# Patient Record
Sex: MALE | NOT HISPANIC OR LATINO | Employment: OTHER
[De-identification: names, ages, dates, MRNs, and addresses within clinical notes are randomized per-mention and may not be internally consistent; named-entity substitution may affect disease eponyms.]

---

## 2017-03-06 ENCOUNTER — RECORDS - HEALTHEAST (OUTPATIENT)
Dept: ADMINISTRATIVE | Facility: OTHER | Age: 62
End: 2017-03-06

## 2017-08-02 ENCOUNTER — RECORDS - HEALTHEAST (OUTPATIENT)
Dept: ADMINISTRATIVE | Facility: OTHER | Age: 62
End: 2017-08-02

## 2017-09-27 ENCOUNTER — TRANSFERRED RECORDS (OUTPATIENT)
Dept: HEALTH INFORMATION MANAGEMENT | Facility: CLINIC | Age: 62
End: 2017-09-27

## 2017-09-27 LAB — TSH SERPL-ACNC: 1.34 UIU/ML (ref 0.35–4.94)

## 2017-10-02 ENCOUNTER — TRANSFERRED RECORDS (OUTPATIENT)
Dept: HEALTH INFORMATION MANAGEMENT | Facility: CLINIC | Age: 62
End: 2017-10-02

## 2017-10-09 ENCOUNTER — RECORDS - HEALTHEAST (OUTPATIENT)
Dept: ADMINISTRATIVE | Facility: OTHER | Age: 62
End: 2017-10-09

## 2017-10-31 ENCOUNTER — RECORDS - HEALTHEAST (OUTPATIENT)
Dept: ADMINISTRATIVE | Facility: OTHER | Age: 62
End: 2017-10-31

## 2017-11-07 ENCOUNTER — PRE VISIT (OUTPATIENT)
Dept: OPHTHALMOLOGY | Facility: CLINIC | Age: 62
End: 2017-11-07

## 2017-11-07 NOTE — TELEPHONE ENCOUNTER
Consult Referred by Dr Elise from [facility] in [place].    For the evaluation of   Chief Complaint   Patient presents with     Previsit     appt w/ Dr Saenzul     Eye Problem     diplopia referred by Dr Elise from [facility]       When was your last eye exam w/ Dilation?   Do you wear glasses? yes Please bring them with you to your appointment.  Do you wear contacts?   How many hours per day to you wear your lenses?   Please bring the boxes with you to your appointment.      HPI:  Location:   OD     Symptoms:   POSITIVE for: and diplopia  Pertinent Negatives:  color blindness, dry eyes, eye pain , foreign body sensation , photophobia  Severity:   moderate  Duration:   years    Timing: progressively getting worse over the last 6 years  Other: diplopia worse when looking to the left and with right head tilt. Disappears with either eye closed. Mostly vertical not much horizontal. Right eye seems images higher    POH:  1-   2-   3-       Do you use any eye drops? no  For what condition(s)?    Ocular Meds:  none       ROS:    Review Of Systems  Eyes: as above  Endocrine:  negative    Allergies:  Allergies not on file    Systemic Medications:   No current outpatient prescriptions on file.     No current facility-administered medications for this visit.        No family history on file.    Social History   Substance Use Topics     Smoking status: Not on file     Smokeless tobacco: Not on file     Alcohol use Not on file     Tammy DÍAZ. OSC

## 2017-11-28 ENCOUNTER — OFFICE VISIT (OUTPATIENT)
Dept: OPHTHALMOLOGY | Facility: CLINIC | Age: 62
End: 2017-11-28
Payer: COMMERCIAL

## 2017-11-28 DIAGNOSIS — R29.891 OCULAR TORTICOLLIS: ICD-10-CM

## 2017-11-28 DIAGNOSIS — H50.21 HYPOTROPIA OF RIGHT EYE: Primary | ICD-10-CM

## 2017-11-28 DIAGNOSIS — H53.2 DIPLOPIA: ICD-10-CM

## 2017-11-28 PROCEDURE — 92060 SENSORIMOTOR EXAMINATION: CPT | Performed by: OPHTHALMOLOGY

## 2017-11-28 PROCEDURE — 92002 INTRM OPH EXAM NEW PATIENT: CPT | Performed by: OPHTHALMOLOGY

## 2017-11-28 RX ORDER — LOSARTAN POTASSIUM 50 MG/1
50 TABLET ORAL DAILY
COMMUNITY
Start: 2017-08-17 | End: 2023-12-04

## 2017-11-28 RX ORDER — ATORVASTATIN CALCIUM 40 MG/1
40 TABLET, FILM COATED ORAL DAILY
COMMUNITY
Start: 2017-08-17 | End: 2023-04-14

## 2017-11-28 ASSESSMENT — REFRACTION_WEARINGRX
OD_CYLINDER: SPHERE
OD_VPRISM: 4
OS_CYLINDER: +1.00
OS_AXIS: 060
OD_SPHERE: -4.75
OD_CYLINDER: +0.25
OD_VBASE: UP
OD_SPHERE: -5.75
OS_SPHERE: -6.50
OS_CYLINDER: +0.50
OS_VBASE: DOWN
OS_AXIS: 68
OD_AXIS: 090
OS_VPRISM: 4
OS_SPHERE: -7.50

## 2017-11-28 ASSESSMENT — EXTERNAL EXAM - RIGHT EYE: OD_EXAM: NORMAL

## 2017-11-28 ASSESSMENT — SLIT LAMP EXAM - LIDS
COMMENTS: NORMAL
COMMENTS: NORMAL

## 2017-11-28 ASSESSMENT — EXTERNAL EXAM - LEFT EYE: OS_EXAM: NORMAL

## 2017-11-28 ASSESSMENT — VISUAL ACUITY
OD_CC: 20/20
METHOD: SNELLEN - LINEAR
CORRECTION_TYPE: GLASSES
OS_CC: 20/20

## 2017-11-28 NOTE — NURSING NOTE
Chief Complaint   Patient presents with     Strabismus Evaluation     Ref by Dr Plascencia for hypotropia, torsion evaluation. MRI was normal. Diplopia is binocular. Blood work for MG and Thyroid were normal, but does not remember if TSI was included. Diplopia starts midlevel towards upgaze, worse in the left gaze. First noted 10 years ago, but getting worse. Got prism glasses 2 weeks ago, but did not them bc it causes diplopia in downgaze     HPI    Symptoms:           Do you have eye pain now?:  No      Comments:  Had bad nose bleed, has probe hooked in the left side. Took valium this morning and last night, but doesn't take it normally

## 2017-11-28 NOTE — NURSING NOTE
Chief Complaint   Patient presents with     Strabismus Evaluation     Ref by Dr Plascencia for hypotropia, torsion evaluation. MRI was normal. Diplopia is binocular. Blood work for MG and Thyroid were normal, but does not remember if TSI was included. Diplopia starts midlevel towards upgaze, worse in the left gaze. First noted 10 years ago, but getting worse. Got prism glasses 2 weeks ago, but did not them bc it causes diplopia in downgaze     HPI    Symptoms:           Do you have eye pain now?:  No
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
## 2017-11-28 NOTE — NURSING NOTE
Chief Complaint   Patient presents with     Strabismus Evaluation     Ref by Dr Plascencia for hypotropia, torsion evaluation. MRI was normal. Diplopia is binocular. Blood work for MG and Thyroid were normal, but does not remember if TSI was included. Diplopia starts midlevel towards upgaze, worse in the left gaze. First noted 10 years ago, but getting worse. Got prism glasses 2 weeks ago, but did not them bc it causes diplopia in downgaze. Does not have bifocals bc needs bottom segment of glasses clear for his binocular single vision     HPI    Symptoms:           Do you have eye pain now?:  No      Comments:  Had bad nose bleed, has probe hooked in the left side. Took valium this morning and last night, but doesn't take it normally

## 2017-12-05 ENCOUNTER — TELEPHONE (OUTPATIENT)
Dept: OPHTHALMOLOGY | Facility: CLINIC | Age: 62
End: 2017-12-05
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
## 2017-12-05 NOTE — TELEPHONE ENCOUNTER
Called to review additional labs and reviewed MRI normal with Dr. Hoffman. Benjamin feels much better now with having his nosebleed resolved (no longer hoarse) and denies any new concerns or issues. Mailed a copy of old glasses prescriptions to me and will bring a copy to next visit -- from memory recalls 1/2009 prescription without any prism and then 3PD down July 2011, 6PD down July 2016 cannot remember over which eye. Reviewed can reschedule to see me sooner than planned 1/5/17 if desired.

## 2017-12-05 NOTE — PATIENT INSTRUCTIONS
Continue to monitor Benjamin's visual function and eye alignment until your next visit with us.  If vision or eye alignment appear to be worsening or if you have any new concerns, please contact our office.  A sooner assessment by Dr. Crawford or our orthoptic team may be necessary. Discussed signs and symptoms of myasthenic crisis including hoarse voice, difficulty swallowing or breathing and need to seek emergent care for any of these symptoms.

## 2017-12-06 DIAGNOSIS — R29.891 OCULAR TORTICOLLIS: ICD-10-CM

## 2017-12-06 DIAGNOSIS — H50.21 HYPOTROPIA OF RIGHT EYE: ICD-10-CM

## 2017-12-06 PROCEDURE — 99000 SPECIMEN HANDLING OFFICE-LAB: CPT | Performed by: OPHTHALMOLOGY

## 2017-12-06 PROCEDURE — 83516 IMMUNOASSAY NONANTIBODY: CPT | Mod: 90 | Performed by: OPHTHALMOLOGY

## 2017-12-06 PROCEDURE — 86800 THYROGLOBULIN ANTIBODY: CPT | Mod: 90 | Performed by: OPHTHALMOLOGY

## 2017-12-06 PROCEDURE — 84432 ASSAY OF THYROGLOBULIN: CPT | Mod: 90 | Performed by: OPHTHALMOLOGY

## 2017-12-06 PROCEDURE — 83519 RIA NONANTIBODY: CPT | Mod: 90 | Performed by: OPHTHALMOLOGY

## 2017-12-06 PROCEDURE — 36415 COLL VENOUS BLD VENIPUNCTURE: CPT | Performed by: OPHTHALMOLOGY

## 2017-12-08 LAB — ACHR BLOCK AB/ACHR TOTAL SFR SER: 0 % (ref 0–26)

## 2017-12-11 ENCOUNTER — OFFICE VISIT (OUTPATIENT)
Dept: OPHTHALMOLOGY | Facility: CLINIC | Age: 62
End: 2017-12-11
Attending: OPHTHALMOLOGY
Payer: COMMERCIAL

## 2017-12-11 DIAGNOSIS — H50.21 HYPOTROPIA OF RIGHT EYE: Primary | ICD-10-CM

## 2017-12-11 DIAGNOSIS — R29.891 OCULAR TORTICOLLIS: ICD-10-CM

## 2017-12-11 DIAGNOSIS — H52.223 REGULAR ASTIGMATISM OF BOTH EYES: ICD-10-CM

## 2017-12-11 DIAGNOSIS — H52.13 MYOPIA OF BOTH EYES: ICD-10-CM

## 2017-12-11 LAB — MUSK AB SER-SCNC: 0 NMOL/L (ref 0–0.02)

## 2017-12-11 PROCEDURE — 92060 SENSORIMOTOR EXAMINATION: CPT | Mod: ZF | Performed by: OPHTHALMOLOGY

## 2017-12-11 PROCEDURE — 99214 OFFICE O/P EST MOD 30 MIN: CPT | Mod: ZF

## 2017-12-11 ASSESSMENT — REFRACTION_WEARINGRX
OD_CYLINDER: +0.25
OS_VBASE: DOWN
OS_CYLINDER: +0.50
OD_SPHERE: -4.75
OS_SPHERE: -6.50
OD_SPHERE: -5.75
SPECS_TYPE: SVL
SPECS_TYPE: PRISM GLASSES
OD_CYLINDER: SPHERE
OD_VPRISM: 4
OS_AXIS: 68
OS_AXIS: 060
OS_CYLINDER: +1.00
OD_AXIS: 090
OD_VBASE: UP
OS_SPHERE: -7.50
OS_VPRISM: 4

## 2017-12-11 ASSESSMENT — CUP TO DISC RATIO
OS_RATIO: 0.3
OD_RATIO: 0.3

## 2017-12-11 ASSESSMENT — REFRACTION
OS_AXIS: 068
OS_SPHERE: -7.25
OD_AXIS: 090
OS_CYLINDER: +1.00
OD_SPHERE: -5.50
OD_CYLINDER: +0.25

## 2017-12-11 ASSESSMENT — VISUAL ACUITY
CORRECTION_TYPE: GLASSES
METHOD: SNELLEN - LINEAR
OS_CC: 20/20
OD_CC: 20/20

## 2017-12-11 ASSESSMENT — EXTERNAL EXAM - RIGHT EYE: OD_EXAM: NORMAL

## 2017-12-11 ASSESSMENT — EXTERNAL EXAM - LEFT EYE: OS_EXAM: NORMAL

## 2017-12-11 NOTE — PATIENT INSTRUCTIONS
"I recommend eye muscle surgery. Today with Benjamin and his spouse, I reviewed the indications, risks, benefits, and alternatives of eye muscle surgery including, but not limited to, failure obtain the desired ocular alignment (\"over\" or \"under\" correction), diplopia, and damage to any structure in or around the eye that may necessitate treatment with medicine, laser, or surgery. I further explained that the goal of surgery is to help control Benjamin's strabismus. Surgery will not \"cure\" Benjamin's strabismus or resolve/prevent the need for refractive corretion. Additional strabismus surgery may be required in the short or long term. I emphasized that regular follow-up to monitor and optimize his vision and alignment would be necessary. We also discussed the risks of surgical injury, bleeding, and infection which may necessitate further medical or surgical treatment and which may result in diplopia, loss of vision, blindness, or loss of the eye(s) in less than 1% of cases and the remote possibility of permanent damage to any organ system or death with the use of general anesthesia.  I explained that we would hide visible scars as much as possible in natural creases but that every patient heals and pigments differently resulting in a variable degree of scarring to the eyes or surrounding facial structures after surgery.  I provided multiple opportunities for questions, answered all questions to the best of my ability, and confirmed that my answers and my discussion were understood.    Read more about your eye muscle surgery or strabismus surgery online at: http://www.aapos.org/terms. Our pediatric ophthalmologists and certified orthoptists are members of the American Association for Pediatric Ophthalmology and Strabismus, an international organization of medical doctors (MDs) and certified orthoptists who completed specialized training in the medical and surgical treatments of all pediatric eye diseases and adult " eye muscle disorders.      For a free and informative book on pediatric eye diseases and adult strabismus, go to:  http://ExoYou.[email or web address]/eyemusclebook    For more information, see also:  Http://eyewiki.aao.org/Category:Pediatric_Ophthalmology/Strabismus

## 2017-12-11 NOTE — NURSING NOTE
Chief Complaint   Patient presents with     Diplopia Follow Up     Wears gls without prism more often.Prism glasses are hard to move eyes around in all fields of gaze. No ptosis ever noted, Right head tilt not necessarily noted now or in past. But does note he likes chin up and left turn.

## 2017-12-11 NOTE — MR AVS SNAPSHOT
"              After Visit Summary   12/11/2017    Benjamin Hooper    MRN: [id number]           Patient Information     Date Of Birth          1955        Visit Information        Provider Department      12/11/2017 1:20 PM Demetrice Crawford MD [facility]        Today's Diagnoses     Hypotropia of right eye    -  1    Ocular torticollis        Myopia of both eyes        Regular astigmatism of both eyes          Care Instructions    I recommend eye muscle surgery. Today with Benjamin and his spouse, I reviewed the indications, risks, benefits, and alternatives of eye muscle surgery including, but not limited to, failure obtain the desired ocular alignment (\"over\" or \"under\" correction), diplopia, and damage to any structure in or around the eye that may necessitate treatment with medicine, laser, or surgery. I further explained that the goal of surgery is to help control Benjamin's strabismus. Surgery will not \"cure\" Benjamin's strabismus or resolve/prevent the need for refractive corretion. Additional strabismus surgery may be required in the short or long term. I emphasized that regular follow-up to monitor and optimize his vision and alignment would be necessary. We also discussed the risks of surgical injury, bleeding, and infection which may necessitate further medical or surgical treatment and which may result in diplopia, loss of vision, blindness, or loss of the eye(s) in less than 1% of cases and the remote possibility of permanent damage to any organ system or death with the use of general anesthesia.  I explained that we would hide visible scars as much as possible in natural creases but that every patient heals and pigments differently resulting in a variable degree of scarring to the eyes or surrounding facial structures after surgery.  I provided multiple opportunities for questions, answered all questions to the best of my ability, and confirmed that my answers and my discussion were " understood.    Read more about your eye muscle surgery or strabismus surgery online at: http://www.aapos.org/terms. Our pediatric ophthalmologists and certified orthoptists are members of the American Association for Pediatric Ophthalmology and Strabismus, an international organization of medical doctors (MDs) and certified orthoptists who completed specialized training in the medical and surgical treatments of all pediatric eye diseases and adult eye muscle disorders.      For a free and informative book on pediatric eye diseases and adult strabismus, go to:  http://Global Integrity.com/eyemusclebook    For more information, see also:  Http://eyewiki.aao.org/Category:Pediatric_Ophthalmology/Strabismus            Follow-ups after your visit        Follow-up notes from your care team     Return for pending workup.      Who to contact     Please call your clinic at [phone] to:    Ask questions about your health    Make or cancel appointments    Discuss your medicines    Learn about your test results    Speak to your doctor   If you have compliments or concerns about an experience at your clinic, or if you wish to file a complaint, please contact [facility] Patient Relations at [phone] or email us at [email or web address]         Additional Information About Your Visit        dynaTrace softwareharDealo Information     [employer] is an electronic gateway that provides easy, online access to your medical records. With [employer], you can request a clinic appointment, read your test results, renew a prescription or communicate with your care team.     To sign up for [employer] visit the website at [email or web address]   You will be asked to enter the access code listed below, as well as some personal information. Please follow the directions to create your username and password.     Your access code is: [email or web address]  Expires: 2018  3:46 PM     Your access code will  in 90 days. If you need help  or a new code, please contact your [facility] or call [phone] for assistance.        Care EveryWhere ID     This is your Care EveryWhere ID. This could be used by other organizations to access your [facility] medical records  [id number]         Blood Pressure from Last 3 Encounters:   No data found for BP    Weight from Last 3 Encounters:   No data found for Wt              We Performed the Following     Sensorimotor        Primary Care Provider Office Phone # Fax #    Philomena Ramírez [phone]       [place] [address] [address] [facility] [address]        Equal Access to Services     [facility]: Hadii aad ku hadasho Soomaali, waaxda luqadaha, qaybta kaalmada adeegyada, waxay chelsie hayaan siena pedraza . So [facility] [phone].    ATENCIÓN: Si habla español, tiene a cody disposición servicios gratuitos de asistencia lingüística. [facility] [phone].    We comply with applicable federal civil rights laws and [place] laws. We do not discriminate on the basis of race, color, national origin, age, disability, sex, sexual orientation, or gender identity.            Thank you!     Thank you for choosing [facility]  for your care. Our goal is always to provide you with excellent care. Hearing back from our patients is one way we can continue to improve our services. Please take a few minutes to complete the written survey that you may receive in the mail after your visit with us. Thank you!             Your Updated Medication List - Protect others around you: Learn how to safely use, store and throw away your medicines at www.disposemymeds.org.          This list is accurate as of: 12/11/17 11:59 PM.  Always use your most recent med list.                   Brand Name Dispense Instructions for use Diagnosis    atorvastatin 20 MG tablet    LIPITOR          losartan 50 MG tablet    COZAAR

## 2017-12-12 PROBLEM — I10 HTN (HYPERTENSION): Status: ACTIVE | Noted: 2017-12-12

## 2017-12-12 PROBLEM — E78.5 HYPERLIPIDEMIA: Status: ACTIVE | Noted: 2017-12-12

## 2017-12-13 ENCOUNTER — TELEPHONE (OUTPATIENT)
Dept: OPHTHALMOLOGY | Facility: CLINIC | Age: 62
End: 2017-12-13

## 2017-12-13 NOTE — TELEPHONE ENCOUNTER
Called Benjamin after discussion of his case with Dr. Chapa to determine any need for further MG workup. Reviewed no further workup at this time but our recommendation to see in 2-3 months to ensure stability of measurements. If unstable would recommend further workup. Agrees with this plan and to call to be seen sooner for any significant changes in double vision, alignment or other concerns.

## 2017-12-17 ENCOUNTER — RADIANT APPOINTMENT (OUTPATIENT)
Dept: GENERAL RADIOLOGY | Facility: CLINIC | Age: 62
End: 2017-12-17
Attending: FAMILY MEDICINE
Payer: COMMERCIAL

## 2017-12-17 ENCOUNTER — OFFICE VISIT (OUTPATIENT)
Dept: URGENT CARE | Facility: URGENT CARE | Age: 62
End: 2017-12-17
Payer: COMMERCIAL

## 2017-12-17 VITALS
RESPIRATION RATE: 18 BRPM | SYSTOLIC BLOOD PRESSURE: 148 MMHG | HEART RATE: 69 BPM | OXYGEN SATURATION: 97 % | DIASTOLIC BLOOD PRESSURE: 88 MMHG

## 2017-12-17 DIAGNOSIS — R10.12 ABDOMINAL PAIN, LEFT UPPER QUADRANT: ICD-10-CM

## 2017-12-17 DIAGNOSIS — F41.9 ANXIETY: ICD-10-CM

## 2017-12-17 DIAGNOSIS — R10.12 ABDOMINAL PAIN, LEFT UPPER QUADRANT: Primary | ICD-10-CM

## 2017-12-17 LAB
ALBUMIN UR-MCNC: NEGATIVE MG/DL
APPEARANCE UR: CLEAR
BASOPHILS # BLD AUTO: 0 10E9/L (ref 0–0.2)
BASOPHILS NFR BLD AUTO: 0.3 %
BILIRUB UR QL STRIP: NEGATIVE
COLOR UR AUTO: YELLOW
DIFFERENTIAL METHOD BLD: NORMAL
EOSINOPHIL # BLD AUTO: 0.2 10E9/L (ref 0–0.7)
EOSINOPHIL NFR BLD AUTO: 1.7 %
ERYTHROCYTE [DISTWIDTH] IN BLOOD BY AUTOMATED COUNT: 12.1 % (ref 10–15)
GLUCOSE UR STRIP-MCNC: NEGATIVE MG/DL
HCT VFR BLD AUTO: 49.2 % (ref 40–53)
HGB BLD-MCNC: 16.9 G/DL (ref 13.3–17.7)
HGB UR QL STRIP: NEGATIVE
KETONES UR STRIP-MCNC: NEGATIVE MG/DL
LEUKOCYTE ESTERASE UR QL STRIP: NEGATIVE
LYMPHOCYTES # BLD AUTO: 3.3 10E9/L (ref 0.8–5.3)
LYMPHOCYTES NFR BLD AUTO: 35.5 %
MCH RBC QN AUTO: 29.9 PG (ref 26.5–33)
MCHC RBC AUTO-ENTMCNC: 34.3 G/DL (ref 31.5–36.5)
MCV RBC AUTO: 87 FL (ref 78–100)
MONOCYTES # BLD AUTO: 0.9 10E9/L (ref 0–1.3)
MONOCYTES NFR BLD AUTO: 9.2 %
NEUTROPHILS # BLD AUTO: 5 10E9/L (ref 1.6–8.3)
NEUTROPHILS NFR BLD AUTO: 53.3 %
NITRATE UR QL: NEGATIVE
PH UR STRIP: 7 PH (ref 5–7)
PLATELET # BLD AUTO: 194 10E9/L (ref 150–450)
RBC # BLD AUTO: 5.65 10E12/L (ref 4.4–5.9)
SOURCE: NORMAL
SP GR UR STRIP: 1.02 (ref 1–1.03)
UROBILINOGEN UR STRIP-ACNC: 0.2 EU/DL (ref 0.2–1)
WBC # BLD AUTO: 9.3 10E9/L (ref 4–11)

## 2017-12-17 PROCEDURE — 85025 COMPLETE CBC W/AUTO DIFF WBC: CPT | Performed by: FAMILY MEDICINE

## 2017-12-17 PROCEDURE — 80053 COMPREHEN METABOLIC PANEL: CPT | Performed by: FAMILY MEDICINE

## 2017-12-17 PROCEDURE — 99204 OFFICE O/P NEW MOD 45 MIN: CPT | Performed by: FAMILY MEDICINE

## 2017-12-17 PROCEDURE — 36415 COLL VENOUS BLD VENIPUNCTURE: CPT | Performed by: FAMILY MEDICINE

## 2017-12-17 PROCEDURE — 81003 URINALYSIS AUTO W/O SCOPE: CPT | Performed by: FAMILY MEDICINE

## 2017-12-17 PROCEDURE — 74020 XR ABDOMEN 2 VW: CPT

## 2017-12-17 RX ORDER — MUPIROCIN 20 MG/G
OINTMENT TOPICAL
COMMUNITY
Start: 2017-12-02 | End: 2019-09-21

## 2017-12-17 RX ORDER — DIAZEPAM 5 MG
2.5-5 TABLET ORAL
COMMUNITY
Start: 2017-11-27 | End: 2019-09-21

## 2017-12-17 NOTE — NURSING NOTE
Chief Complaint   Patient presents with     Urgent Care     upper abdomen is swollen/distended, on and off x1 day - giving him anxiety due to it - hot/cold sweats, yawning, dizziness, blood pressure has been being elevated (normally 120/80, going up to about 170/99) back pain - abdominal swelling is more on the LUQ        Initial /88 (BP Location: Right arm, Patient Position: Chair, Cuff Size: Adult Regular)  Pulse 69  Resp 18  SpO2 97% There is no height or weight on file to calculate BMI.  Medication Reconciliation: complete  Cleopatra Howe, CMA

## 2017-12-17 NOTE — MR AVS SNAPSHOT
After Visit Summary   12/17/2017    Benjamin Hooper    MRN: [id number]           Patient Information     Date Of Birth          1955        Visit Information        Provider Department      12/17/2017 4:10 PM Arnold Michael MD [facility]        Today's Diagnoses     Abdominal pain, left upper quadrant    -  1      Care Instructions    Okay to take gas-X    Consider miralax      Excess Gas  Certain foods produce gas when digested. In some people, these foods make an excessive amount of gas. This may cause bloating, burping, or increased gas passing through the rectum (flatulence).     Foods that cause gas  The following foods are more likely to cause this problem. Limit them, or remove them from your diet:    Broccoli    Cauliflower    [place] sprouts    Cabbage    Cooked dried beans    Fizzy (carbonated) drinks, such as sparkling water, soda, beer, and champagne  Other causes  Other causes of excess gas include:    Eating too fast or talking while you chew. This may cause you to swallow air. This increases the amount of gas in your stomach. And it may make your symptoms worse. Chew each mouthful completely before you swallow. Take your time.    Chewing on gum or sucking on hard candy. These cause you to swallow more often. And some of what you are swallowing is air. This leads to more gas in your stomach. Avoid chewing gum and hard candy.    Overeating. This may increase the feeling of being bloated and cause more gas. When you are full, stop eating.     Being constipated. This can increase the amount of normal intestinal gas. Avoid constipation by getting more fiber in your diet. Good sources of fiber include whole-grain cereal, fresh vegetables (except those in the above list), and fresh fruits. High-fiber foods absorb water and carry it out of the body. When adding more fiber to your diet, you also need to drink more water. You should drink at least 8, 8-ounce glasses of water  (2 quarts) per day.  Date Last Reviewed: 8/1/2016 2000-2017 [employer]. [address] [place] [address], [place] [address]. All rights reserved. This information is not intended as a substitute for professional medical care. Always follow your healthcare professional's instructions.        Constipation (Adult)  Constipation means that you have bowel movements that are less frequent than usual. Stools often become very hard and difficult to pass.  Constipation is very common. At some point in life it affects almost everyone. Since everyone's bowel habits are different, what is constipation to one person may not be to another. Your healthcare provider may do tests to diagnose constipation. It depends on what he or she finds when evaluating you.    Symptoms of constipation include:    Abdominal pain    Bloating    Vomiting    Painful bowel movements    Itching, swelling, bleeding, or pain around the anus  Causes  Constipation can have many causes. These include:    Diet low in fiber    Too much dairy    Not drinking enough liquids    Lack of exercise or physical activity. This is especially true for older adults.    Changes in lifestyle or daily routine, including pregnancy, aging, work, and travel    Frequent use or misuse of laxatives    Ignoring the urge to have a bowel movement or delaying it until later    Medicines, such as certain prescription pain medicines, iron supplements, antacids, certain antidepressants, and calcium supplements    Diseases like irritable bowel syndrome, bowel obstructions, stroke, diabetes, thyroid disease, Parkinson disease, hemorrhoids, and colon cancer  Complications  Potential complications of constipation can include:    Hemorrhoids    Rectal bleeding from hemorrhoids or anal fissures (skin tears)    Hernias    Dependency on laxatives    Chronic constipation    Fecal impaction    Bowel obstruction or perforation  Home care  All treatment should be done after talking with  your healthcare provider. This is especially true if you have another medical problems, are taking prescription medicines, or are an older adult. Treatment most often involves lifestyle changes. You may also need medicines. Your healthcare provider will tell you which will work best for you. Follow the advice below to help avoid this problem in the future.  Lifestyle changes  These lifestyle changes can help prevent constipation:    Diet. Eat a high-fiber diet, with fresh fruit and vegetables, and reduce dairy intake, meats, and processed foods    Fluids. It's important to get enough fluids each day. Drink plenty of water when you eat more fiber. If you are on diet that limits the amount of fluid you can have, talk about this with your healthcare provider.    Regular exercise. Check with your healthcare provider first.  Medications  Take any medicines as directed. Some laxatives are safe to use only every now and then. Others can be taken on a regular basis. Talk with your doctor or pharmacist if you have questions.  Prescription pain medicines can cause constipation. If you are taking this kind of medicine, ask your healthcare provider if you should also take a stool softener.  Medicines you may take to treat constipation include:    Fiber supplements    Stool softeners    Laxatives    Enemas    Rectal suppositories  Follow-up care  Follow up with your healthcare provider if symptoms don't get better in the next few days. You may need to have more tests or see a specialist.  Call 911  Call 911 if any of these occur:    Trouble breathing    Stiff, rigid abdomen that is severely painful to touch    Confusion    Fainting or loss of consciousness    Rapid heart rate    Chest pain  When to seek medical advice  Call your healthcare provider right away if any of these occur:    Fever over 100.4 F (38 C)    Failure to resume normal bowel movements    Pain in your abdomen or back gets worse    Nausea or vomiting    Swelling  "in your abdomen    Blood in the stool    Black, tarry stool    Involuntary weight loss    Weakness  Date Last Reviewed: 12/30/2015 2000-2017 [employer]. [address] [place] [address], [place] [address]. All rights reserved. This information is not intended as a substitute for professional medical care. Always follow your healthcare professional's instructions.                Follow-ups after your visit        Your next 10 appointments already scheduled     Mar 05, 2018  1:00 PM CST   Return Pediatric Visit with Demetrice Crawford MD   [facility] ([facility])    [address]  [address] [address] [phone]              Who to contact     If you have questions or need follow up information about today's clinic visit or your schedule please contact [facility] directly at [phone].  Normal or non-critical lab and imaging results will be communicated to you by MyChart, letter or phone within 4 business days after the clinic has received the results. If you do not hear from us within 7 days, please contact the clinic through [employer] or phone. If you have a critical or abnormal lab result, we will notify you by phone as soon as possible.  Submit refill requests through [employer] or call your pharmacy and they will forward the refill request to us. Please allow 3 business days for your refill to be completed.          Additional Information About Your Visit        MyChart Information     [employer] lets you send messages to your doctor, view your test results, renew your prescriptions, schedule appointments and more. To sign up, go to [email or web address] . Click on \"Log in\" on the left side of the screen, which will take you to the Welcome page. Then click on \"Sign up Now\" on the right side of the page.     You will be asked to enter the access code listed below, as well as some personal information. Please follow the directions to create your " username and password.     Your access code is: [email or web address]  Expires: 2018  3:46 PM     Your access code will  in 90 days. If you need help or a new code, please call your [place] clinic or [phone].        Care EveryWhere ID     This is your Care EveryWhere ID. This could be used by other organizations to access your [place] medical records  [id number]        Your Vitals Were     Pulse Respirations Pulse Oximetry             69 18 97%          Blood Pressure from Last 3 Encounters:   17 148/88    Weight from Last 3 Encounters:   No data found for Wt              We Performed the Following     *UA reflex to Microscopic and Culture ([facility] (except [place] and [place])     CBC with platelets and differential     Comprehensive metabolic panel (BMP + Alb, Alk Phos, ALT, AST, Total. Bili, TP)        Primary Care Provider Office Phone # Fax #    Philomena MARILEE Ramírez [phone]       [address] [address]        Equal Access to Services     GENEVIEVE MAURICIO : Hadii aad ku hadasho Soomaali, waaxda luqadaha, qaybta kaalmada adeegyada, waxay idiin hayaan adeeg derian pedraza . So [facility] [phone].    ATENCIÓN: Si habla español, tiene a cody disposición servicios gratuitos de asistencia lingüística. Llame al [phone].    We comply with applicable federal civil rights laws and [place] laws. We do not discriminate on the basis of race, color, national origin, age, disability, sex, sexual orientation, or gender identity.            Thank you!     Thank you for choosing [facility]  for your care. Our goal is always to provide you with excellent care. Hearing back from our patients is one way we can continue to improve our services. Please take a few minutes to complete the written survey that you may receive in the mail after your visit with us. Thank you!             Your Updated Medication List - Protect others  around you: Learn how to safely use, store and throw away your medicines at www.disposemymeds.org.          This list is accurate as of: 12/17/17  6:30 PM.  Always use your most recent med list.                   Brand Name Dispense Instructions for use Diagnosis    atorvastatin 20 MG tablet    LIPITOR          diazepam 5 MG tablet    VALIUM     Take 2.5-5 mg by mouth        losartan 50 MG tablet    COZAAR          mupirocin 2 % ointment    BACTROBAN
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
## 2017-12-18 ENCOUNTER — RECORDS - HEALTHEAST (OUTPATIENT)
Dept: ADMINISTRATIVE | Facility: OTHER | Age: 62
End: 2017-12-18

## 2017-12-18 LAB
ALBUMIN SERPL-MCNC: 4.3 G/DL (ref 3.4–5)
ALP SERPL-CCNC: 61 U/L (ref 40–150)
ALT SERPL W P-5'-P-CCNC: 46 U/L (ref 0–70)
ANION GAP SERPL CALCULATED.3IONS-SCNC: 8 MMOL/L (ref 3–14)
AST SERPL W P-5'-P-CCNC: 35 U/L (ref 0–45)
BILIRUB SERPL-MCNC: 1.6 MG/DL (ref 0.2–1.3)
BUN SERPL-MCNC: 14 MG/DL (ref 7–30)
CALCIUM SERPL-MCNC: 9.6 MG/DL (ref 8.5–10.1)
CHLORIDE SERPL-SCNC: 104 MMOL/L (ref 94–109)
CO2 SERPL-SCNC: 24 MMOL/L (ref 20–32)
CREAT SERPL-MCNC: 0.87 MG/DL (ref 0.66–1.25)
GFR SERPL CREATININE-BSD FRML MDRD: 88 ML/MIN/1.7M2
GLUCOSE SERPL-MCNC: 86 MG/DL (ref 70–99)
POTASSIUM SERPL-SCNC: 3.8 MMOL/L (ref 3.4–5.3)
PROT SERPL-MCNC: 7.4 G/DL (ref 6.8–8.8)
SODIUM SERPL-SCNC: 136 MMOL/L (ref 133–144)

## 2017-12-18 NOTE — PATIENT INSTRUCTIONS
Okay to take gas-X    Consider miralax      Excess Gas  Certain foods produce gas when digested. In some people, these foods make an excessive amount of gas. This may cause bloating, burping, or increased gas passing through the rectum (flatulence).     Foods that cause gas  The following foods are more likely to cause this problem. Limit them, or remove them from your diet:    Broccoli    Cauliflower    [place] sprouts    Cabbage    Cooked dried beans    Fizzy (carbonated) drinks, such as sparkling water, soda, beer, and champagne  Other causes  Other causes of excess gas include:    Eating too fast or talking while you chew. This may cause you to swallow air. This increases the amount of gas in your stomach. And it may make your symptoms worse. Chew each mouthful completely before you swallow. Take your time.    Chewing on gum or sucking on hard candy. These cause you to swallow more often. And some of what you are swallowing is air. This leads to more gas in your stomach. Avoid chewing gum and hard candy.    Overeating. This may increase the feeling of being bloated and cause more gas. When you are full, stop eating.     Being constipated. This can increase the amount of normal intestinal gas. Avoid constipation by getting more fiber in your diet. Good sources of fiber include whole-grain cereal, fresh vegetables (except those in the above list), and fresh fruits. High-fiber foods absorb water and carry it out of the body. When adding more fiber to your diet, you also need to drink more water. You should drink at least 8, 8-ounce glasses of water (2 quarts) per day.  Date Last Reviewed: 8/1/2016 2000-2017 [employer]. [address] [place] [address]. All rights reserved. This information is not intended as a substitute for professional medical care. Always follow your healthcare professional's instructions.        Constipation (Adult)  Constipation means that you have bowel movements  that are less frequent than usual. Stools often become very hard and difficult to pass.  Constipation is very common. At some point in life it affects almost everyone. Since everyone's bowel habits are different, what is constipation to one person may not be to another. Your healthcare provider may do tests to diagnose constipation. It depends on what he or she finds when evaluating you.    Symptoms of constipation include:    Abdominal pain    Bloating    Vomiting    Painful bowel movements    Itching, swelling, bleeding, or pain around the anus  Causes  Constipation can have many causes. These include:    Diet low in fiber    Too much dairy    Not drinking enough liquids    Lack of exercise or physical activity. This is especially true for older adults.    Changes in lifestyle or daily routine, including pregnancy, aging, work, and travel    Frequent use or misuse of laxatives    Ignoring the urge to have a bowel movement or delaying it until later    Medicines, such as certain prescription pain medicines, iron supplements, antacids, certain antidepressants, and calcium supplements    Diseases like irritable bowel syndrome, bowel obstructions, stroke, diabetes, thyroid disease, Parkinson disease, hemorrhoids, and colon cancer  Complications  Potential complications of constipation can include:    Hemorrhoids    Rectal bleeding from hemorrhoids or anal fissures (skin tears)    Hernias    Dependency on laxatives    Chronic constipation    Fecal impaction    Bowel obstruction or perforation  Home care  All treatment should be done after talking with your healthcare provider. This is especially true if you have another medical problems, are taking prescription medicines, or are an older adult. Treatment most often involves lifestyle changes. You may also need medicines. Your healthcare provider will tell you which will work best for you. Follow the advice below to help avoid this problem in the future.  Lifestyle  changes  These lifestyle changes can help prevent constipation:    Diet. Eat a high-fiber diet, with fresh fruit and vegetables, and reduce dairy intake, meats, and processed foods    Fluids. It's important to get enough fluids each day. Drink plenty of water when you eat more fiber. If you are on diet that limits the amount of fluid you can have, talk about this with your healthcare provider.    Regular exercise. Check with your healthcare provider first.  Medications  Take any medicines as directed. Some laxatives are safe to use only every now and then. Others can be taken on a regular basis. Talk with your doctor or pharmacist if you have questions.  Prescription pain medicines can cause constipation. If you are taking this kind of medicine, ask your healthcare provider if you should also take a stool softener.  Medicines you may take to treat constipation include:    Fiber supplements    Stool softeners    Laxatives    Enemas    Rectal suppositories  Follow-up care  Follow up with your healthcare provider if symptoms don't get better in the next few days. You may need to have more tests or see a specialist.  Call 911  Call 911 if any of these occur:    Trouble breathing    Stiff, rigid abdomen that is severely painful to touch    Confusion    Fainting or loss of consciousness    Rapid heart rate    Chest pain  When to seek medical advice  Call your healthcare provider right away if any of these occur:    Fever over 100.4 F (38 C)    Failure to resume normal bowel movements    Pain in your abdomen or back gets worse    Nausea or vomiting    Swelling in your abdomen    Blood in the stool    Black, tarry stool    Involuntary weight loss    Weakness  Date Last Reviewed: 12/30/2015 2000-2017 [employer]. [address] [place] [address], [place] [address]. All rights reserved. This information is not intended as a substitute for professional medical care. Always follow your healthcare professional's  instructions.

## 2017-12-20 ENCOUNTER — RECORDS - HEALTHEAST (OUTPATIENT)
Dept: ADMINISTRATIVE | Facility: OTHER | Age: 62
End: 2017-12-20

## 2017-12-20 ENCOUNTER — TELEPHONE (OUTPATIENT)
Dept: URGENT CARE | Facility: URGENT CARE | Age: 62
End: 2017-12-20

## 2017-12-20 NOTE — TELEPHONE ENCOUNTER
Notified patient that bilirubin level was elevated and that he should follow up with primary provider in 1-2 weeks for a recheck. Patient agreed to plan and will follow up.   Imani Lin CMA ([facility]) 12/20/2017 11:03 AM

## 2017-12-21 ENCOUNTER — COMMUNICATION - HEALTHEAST (OUTPATIENT)
Dept: TELEHEALTH | Facility: CLINIC | Age: 62
End: 2017-12-21

## 2017-12-21 ENCOUNTER — OFFICE VISIT - HEALTHEAST (OUTPATIENT)
Dept: FAMILY MEDICINE | Facility: CLINIC | Age: 62
End: 2017-12-21

## 2017-12-21 DIAGNOSIS — E80.6 HYPERBILIRUBINEMIA: ICD-10-CM

## 2017-12-21 DIAGNOSIS — I10 ESSENTIAL HYPERTENSION: ICD-10-CM

## 2017-12-21 DIAGNOSIS — F41.9 ANXIETY: ICD-10-CM

## 2017-12-21 DIAGNOSIS — F40.240 CLAUSTROPHOBIA: ICD-10-CM

## 2017-12-21 DIAGNOSIS — F40.00 AGORAPHOBIA: ICD-10-CM

## 2017-12-21 LAB — LAB SCANNED RESULT: ABNORMAL

## 2017-12-23 PROBLEM — F41.9 ANXIETY: Status: ACTIVE | Noted: 2017-12-23

## 2018-01-04 ENCOUNTER — TELEPHONE (OUTPATIENT)
Dept: OPHTHALMOLOGY | Facility: CLINIC | Age: 63
End: 2018-01-04

## 2018-01-04 NOTE — TELEPHONE ENCOUNTER
Called [facility] Endocrine labs to review lab results of TgAb. Called back by director Dr. Keller. Benjamin's results are normal and is flagged high because the reference range is for patients with differentiated thyroid cancer. The table for Tg is also not applicable. A large proportion of normal patients have low levels of TgAb. She would consider TgAb levels of over 2 or 3 to be elevated and potentially concerning for Hashimoto's. This is not the case with Benjamin. Will review negative result with Benjamin at upcoming visit.

## 2018-01-17 ENCOUNTER — COMMUNICATION - HEALTHEAST (OUTPATIENT)
Dept: FAMILY MEDICINE | Facility: CLINIC | Age: 63
End: 2018-01-17

## 2018-01-22 ENCOUNTER — RECORDS - HEALTHEAST (OUTPATIENT)
Dept: ADMINISTRATIVE | Facility: OTHER | Age: 63
End: 2018-01-22

## 2018-02-01 ENCOUNTER — OFFICE VISIT - HEALTHEAST (OUTPATIENT)
Dept: FAMILY MEDICINE | Facility: CLINIC | Age: 63
End: 2018-02-01

## 2018-02-01 DIAGNOSIS — F40.00 AGORAPHOBIA: ICD-10-CM

## 2018-02-01 DIAGNOSIS — F40.240 CLAUSTROPHOBIA: ICD-10-CM

## 2018-02-01 DIAGNOSIS — E80.6 HYPERBILIRUBINEMIA: ICD-10-CM

## 2018-02-01 DIAGNOSIS — E78.00 PURE HYPERCHOLESTEROLEMIA: ICD-10-CM

## 2018-02-01 LAB
ALBUMIN SERPL-MCNC: 4.1 G/DL (ref 3.5–5)
ALP SERPL-CCNC: 63 U/L (ref 45–120)
ALT SERPL W P-5'-P-CCNC: 33 U/L (ref 0–45)
AST SERPL W P-5'-P-CCNC: 30 U/L (ref 0–40)
BILIRUB DIRECT SERPL-MCNC: 0.6 MG/DL
BILIRUB SERPL-MCNC: 2.2 MG/DL (ref 0–1)
CHOLEST SERPL-MCNC: 173 MG/DL
FASTING STATUS PATIENT QL REPORTED: YES
HDLC SERPL-MCNC: 50 MG/DL
LDLC SERPL CALC-MCNC: 105 MG/DL
PROT SERPL-MCNC: 6.7 G/DL (ref 6–8)
TRIGL SERPL-MCNC: 91 MG/DL

## 2018-02-05 ENCOUNTER — AMBULATORY - HEALTHEAST (OUTPATIENT)
Dept: FAMILY MEDICINE | Facility: CLINIC | Age: 63
End: 2018-02-05

## 2018-02-05 DIAGNOSIS — E80.6 HYPERBILIRUBINEMIA: ICD-10-CM

## 2018-02-06 ENCOUNTER — RECORDS - HEALTHEAST (OUTPATIENT)
Dept: ADMINISTRATIVE | Facility: OTHER | Age: 63
End: 2018-02-06

## 2018-03-05 ENCOUNTER — OFFICE VISIT (OUTPATIENT)
Dept: OPHTHALMOLOGY | Facility: CLINIC | Age: 63
End: 2018-03-05
Attending: OPHTHALMOLOGY
Payer: COMMERCIAL

## 2018-03-05 DIAGNOSIS — H53.2 DIPLOPIA: ICD-10-CM

## 2018-03-05 DIAGNOSIS — R29.891 OCULAR TORTICOLLIS: ICD-10-CM

## 2018-03-05 DIAGNOSIS — H52.13 MYOPIC ASTIGMATISM OF BOTH EYES: ICD-10-CM

## 2018-03-05 DIAGNOSIS — H52.203 MYOPIC ASTIGMATISM OF BOTH EYES: ICD-10-CM

## 2018-03-05 DIAGNOSIS — H50.21 HYPOTROPIA OF RIGHT EYE: Primary | ICD-10-CM

## 2018-03-05 PROCEDURE — G0463 HOSPITAL OUTPT CLINIC VISIT: HCPCS | Mod: 25,ZF

## 2018-03-05 PROCEDURE — 92060 SENSORIMOTOR EXAMINATION: CPT | Mod: ZF | Performed by: OPHTHALMOLOGY

## 2018-03-05 ASSESSMENT — VISUAL ACUITY
METHOD: SNELLEN - LINEAR
OS_CC: 20/20
CORRECTION_TYPE: GLASSES
OD_CC: 20/20
OS_CC+: -2
OD_CC+: -3

## 2018-03-05 ASSESSMENT — REFRACTION_WEARINGRX
OD_AXIS: 090
OS_AXIS: 68
OS_SPHERE: -7.50
OS_CYLINDER: +0.75
SPECS_TYPE: SVL
OD_CYLINDER: +0.25
OS_VPRISM: 5
OS_AXIS: 071
OS_CYLINDER: +1.00
OD_CYLINDER: SPHERE
OS_SPHERE: -6.75
OD_SPHERE: -5.75
SPECS_TYPE: PRISM GLASSES
OS_VBASE: DOWN
OD_SPHERE: -4.00

## 2018-03-05 ASSESSMENT — EXTERNAL EXAM - RIGHT EYE: OD_EXAM: NORMAL

## 2018-03-05 ASSESSMENT — EXTERNAL EXAM - LEFT EYE: OS_EXAM: NORMAL

## 2018-03-05 NOTE — LETTER
"3/5/2018    To: Philomena Ramírez NP  [facility]  [address] [facility] [address]    Re:  Benjamin Hooper    YOB: 1955    MRN: [id number]    Dear Colleague,     It was my pleasure to see Benjamin on 3/5/2018.  In summary, Benjamin Hooper is a 62 year old male who presents with:     Hypotropia of right eye  Ocular torticollis  Diplopia  Myopic astigmatism of both eyes   10 year history of gradually increasing intermittent binocular diplopia. In prism glasses.   MRI orbits/brain (outside, 10/2/17) without causative abnormality.   MG and MARCELLA workup negative.     Incomittant strabismus with large 25 PD right hypotropia in primary (prefers to fixate left eye) increasing across left field of gaze and up gaze. Deficient across upgaze right eye. Minimal  adduction deficit left eye.  Requires both vertical and torsional correction for fusion in primary. 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   Discussed that while negative lab workup for MG is not definitive, is unlikely to be MG and with stable measurements over months we can proceed with eye muscle surgery when he is ready.   Mr. Hooper is significantly bothered by his double vision. Again we discussed temporary measures to improve double vision while awaiting eye muscle surgery, but he did not tolerate fresnel trial today (5,8 and 10 BU right eye) and prefers ground in prism. Will continue with current prism glasses for now.  - I recommend eye muscle surgery. Today with Benjamin and his spouse, I reviewed the indications, risks, benefits, and alternatives of eye muscle surgery including, but not limited to, failure obtain the desired ocular alignment (\"over\" or \"under\" correction), diplopia, and damage to any structure in or around the eye that may necessitate treatment with medicine, laser, or " "surgery. I further explained that the goal of surgery is to help control Benjamin's strabismus. Surgery will not \"cure\" Benjamin's strabismus or resolve/prevent the need for refractive corretion. Additional strabismus surgery may be required in the short or long term. I emphasized that regular follow-up to monitor and optimize his vision and alignment would be necessary. We also discussed the risks of surgical injury, bleeding, and infection which may necessitate further medical or surgical treatment and which may result in diplopia, loss of vision, blindness, or loss of the eye(s) in less than 1% of cases and the remote possibility of permanent damage to any organ system or death with the use of general anesthesia.  I explained that we would hide visible scars as much as possible in natural creases but that every patient heals and pigments differently resulting in a variable degree of scarring to the eyes or surrounding facial structures after surgery.  I provided multiple opportunities for questions, answered all questions to the best of my ability, and confirmed that my answers and my discussion were understood.  - We again discussed the goals of eye muscle surgery including expanding field of binocular single vision, the need for forced ductions for surgical planning and possible surgical plans. We also discussed the option of adjustable suture. He does have some anxiety.   - Likely RIRc+LSRc if FD positive right eye, offset LSRc temporally for torsion. Possible RIRc on adjustable. Could consider RIRc+RSRs but more likely to have residual left gaze dipoplia.  - Recommend Mr. Hooper contact his primary care physician regarding the family history of Von Willebrand's prior to proceeding with eye muscle surgery. We discussed avoidance of blood thinners (both prescription and over the counter medications/supplements) 14 days prior to surgery.   - He is very interested in proceeding but would like to get a second " opinion first. Offered second opinion with my colleagues here or with outside providers including Kevin Monae at [facility]. I am glad to know Mr. Hooper is already schedule with Dr. Monae at [facility] and reviewed that he can proceed with eye muscle surgery with either of us. He will call to schedule surgery/preop remeasurement if he decides to proceed.      Thank you for the opportunity to care for Benjamin. I have asked him to Return for preop measurements.  Until then, please do not hesitate to contact me or my clinic with any questions or concerns.          Warm regards,          Demetrice Crawford MD                 Pediatric Ophthalmology & Strabismus        Department of Ophthalmology & Visual Neurosciences        [facility]   CC:  Benjamin Hooper

## 2018-03-05 NOTE — MR AVS SNAPSHOT
After Visit Summary   3/5/2018    Benjamin Hooper    MRN: [id number]           Patient Information     Date Of Birth          1955        Visit Information        Provider Department      3/5/2018 1:00 PM Demetrice Crawford MD [facility] Peds Eye General        Today's Diagnoses     Hypotropia of right eye    -  1    Ocular torticollis        Diplopia        Myopic astigmatism of both eyes           Follow-ups after your visit        Follow-up notes from your care team     Return for preop measurements.      Who to contact     Please call your clinic at [phone] to:    Ask questions about your health    Make or cancel appointments    Discuss your medicines    Learn about your test results    Speak to your doctor            Additional Information About Your Visit        [facility] Information     [facility] gives you secure access to your electronic health record. If you see a primary care provider, you can also send messages to your care team and make appointments. If you have questions, please call your primary care clinic.  If you do not have a primary care provider, please call [phone] and they will assist you.      [facility] is an electronic gateway that provides easy, online access to your medical records. With [facility], you can request a clinic appointment, read your test results, renew a prescription or communicate with your care team.     To access your existing account, please contact your [facility] or call [phone] for assistance.        Care EveryWhere ID     This is your Care EveryWhere ID. This could be used by other organizations to access your [place] medical records  [id number]         Blood Pressure from Last 3 Encounters:   12/17/17 148/88    Weight from Last 3 Encounters:   No data found for Wt              We Performed the Following     Sensorimotor        Primary Care Provider Office Phone # Fax #    Philomena Ramírez [phone]        [facility] [address] [facility] [address]        Equal Access to Services     INOCENCIOELHAM LULY : Hadii aad ku haddelbertcordell Salter, walizyda gladys, qajose a gregorymaphoenix barahona, javier almaguertraeangel escalera. So [facility] [phone].    ATENCIÓN: Si habla español, tiene a cody disposición servicios gratuitos de asistencia lingüística. Llame al [phone].    We comply with applicable federal civil rights laws and [place] laws. We do not discriminate on the basis of race, color, national origin, age, disability, sex, sexual orientation, or gender identity.            Thank you!     Thank you for choosing [facility] EYE GENERAL  for your care. Our goal is always to provide you with excellent care. Hearing back from our patients is one way we can continue to improve our services. Please take a few minutes to complete the written survey that you may receive in the mail after your visit with us. Thank you!             Your Updated Medication List - Protect others around you: Learn how to safely use, store and throw away your medicines at www.disposemymeds.org.          This list is accurate as of 3/5/18 11:59 PM.  Always use your most recent med list.                   Brand Name Dispense Instructions for use Diagnosis    atorvastatin 20 MG tablet    LIPITOR          diazepam 5 MG tablet    VALIUM     Take 2.5-5 mg by mouth        losartan 50 MG tablet    COZAAR          mupirocin 2 % ointment    BACTROBAN

## 2018-03-05 NOTE — PROGRESS NOTES
Chief Complaints and History of Present Illnesses   Patient presents with     Diplopia Follow Up     Benjamin is noting worsening diplopia since LV, but could just be subjective. Wearing non-prism glasses now, occasional wears computer glasses with prism. Feels like he is tilting chin back more often.     Using the non-prism distance glasses most of the time. Uses the computer glasses only for computer. Having a lot of double vision still when looking up or to the left. Notes torsion in primary and left gaze. No torsion with right gaze.      Planning for eye muscle surgery but considering second opinion. Also unsure if needs bleeding workup before surgery as h/o Von Willebrand's disease in his sister. Did have hernia surgery in the past without any known bleeding issues.   Review of systems for the eyes was negative other than the pertinent positives and negatives noted in the HPI.  History is obtained from the patient and spouse.     Primary care: Philomena Ramírez   Referring provider: Demetrice GRANADOS [facility] is home  Assessment & Plan   Benjamin Hooper is a 62 year old male who presents with:     Hypotropia of right eye  Ocular torticollis  Diplopia  Myopic astigmatism of both eyes   10 year history of gradually increasing intermittent binocular diplopia. In prism glasses.   MRI orbits/brain (outside, 10/2/17) without causative abnormality.   MG and MARCELLA workup negative.     Incomittant strabismus with large 25 PD right hypotropia in primary (prefers to fixate left eye) increasing across left field of gaze and up gaze. Deficient across upgaze right eye. Minimal  adduction deficit left eye.  Requires both vertical and torsional correction for fusion in primary. 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   Discussed that while negative lab workup for MG is not definitive, is  "unlikely to be MG and with stable measurements over months we can proceed with eye muscle surgery when he is ready.   Mr. Hooper is significantly bothered by his double vision. Again we discussed temporary measures to improve double vision while awaiting eye muscle surgery, but he did not tolerate fresnel trial today (5,8 and 10 BU right eye) and prefers ground in prism. Will continue with current prism glasses for now.  - I recommend eye muscle surgery. Today with Benjamin and his spouse, I reviewed the indications, risks, benefits, and alternatives of eye muscle surgery including, but not limited to, failure obtain the desired ocular alignment (\"over\" or \"under\" correction), diplopia, and damage to any structure in or around the eye that may necessitate treatment with medicine, laser, or surgery. I further explained that the goal of surgery is to help control Benjamin's strabismus. Surgery will not \"cure\" Benjamin's strabismus or resolve/prevent the need for refractive corretion. Additional strabismus surgery may be required in the short or long term. I emphasized that regular follow-up to monitor and optimize his vision and alignment would be necessary. We also discussed the risks of surgical injury, bleeding, and infection which may necessitate further medical or surgical treatment and which may result in diplopia, loss of vision, blindness, or loss of the eye(s) in less than 1% of cases and the remote possibility of permanent damage to any organ system or death with the use of general anesthesia.  I explained that we would hide visible scars as much as possible in natural creases but that every patient heals and pigments differently resulting in a variable degree of scarring to the eyes or surrounding facial structures after surgery.  I provided multiple opportunities for questions, answered all questions to the best of my ability, and confirmed that my answers and my discussion were understood.  - We again " discussed the goals of eye muscle surgery including expanding field of binocular single vision, the need for forced ductions for surgical planning and possible surgical plans. We also discussed the option of adjustable suture. He does have some anxiety.   - Likely RIRc+LSRc if FD positive right eye, offset LSRc temporally for torsion. Possible RIRc on adjustable. Could consider RIRc+RSRs but more likely to have residual left gaze dipoplia.  - Recommend Mr. Hooper contact his primary care physician regarding the family history of Von Willebrand's prior to proceeding with eye muscle surgery. We discussed avoidance of blood thinners (both prescription and over the counter medications/supplements) 14 days prior to surgery.   - He is very interested in proceeding but would like to get a second opinion first. Offered second opinion with my colleagues here or with outside providers including Kevin Monae at [facility]. I am glad to know Mr. Hooper is already schedule with Dr. Monae at [place] and reviewed that he can proceed with eye muscle surgery with either of us. He will call to schedule surgery/preop remeasurement if he decides to proceed.        Return for preop measurements.    There are no Patient Instructions on file for this visit.    Visit Diagnoses & Orders    ICD-10-CM    1. Hypotropia of right eye H50.21 Sensorimotor   2. Ocular torticollis R29.891    3. Diplopia H53.2    4. Myopic astigmatism of both eyes H52.203     H52.13       Attending Physician Attestation:  Complete documentation of historical and exam elements from today's encounter can be found in the full encounter summary report (not reduplicated in this progress note).  I personally obtained the chief complaint(s) and history of present illness.  I confirmed and edited as necessary the review of systems, past medical/surgical history, family history, social history, and examination findings as documented by others; and I examined the patient myself.  I  personally reviewed the relevant tests, images, and reports as documented above.  I formulated and edited as necessary the assessment and plan and discussed the findings and management plan with the patient and family. - Demetrice Crawford MD

## 2018-03-05 NOTE — NURSING NOTE
Chief Complaint   Patient presents with     Diplopia Follow Up     Benjamin is noting worsening diplopia since LV, but could just be subjective. Wearing non-prism glasses now, occasional wears computer glasses with prism. Feels like he is tilting chin back more often.

## 2018-06-01 ENCOUNTER — RECORDS - HEALTHEAST (OUTPATIENT)
Dept: ADMINISTRATIVE | Facility: OTHER | Age: 63
End: 2018-06-01

## 2019-01-08 ENCOUNTER — COMMUNICATION - HEALTHEAST (OUTPATIENT)
Dept: FAMILY MEDICINE | Facility: CLINIC | Age: 64
End: 2019-01-08

## 2019-01-11 ENCOUNTER — COMMUNICATION - HEALTHEAST (OUTPATIENT)
Dept: FAMILY MEDICINE | Facility: CLINIC | Age: 64
End: 2019-01-11

## 2019-09-21 ENCOUNTER — OFFICE VISIT (OUTPATIENT)
Dept: URGENT CARE | Facility: URGENT CARE | Age: 64
End: 2019-09-21
Payer: MEDICARE

## 2019-09-21 VITALS
DIASTOLIC BLOOD PRESSURE: 80 MMHG | OXYGEN SATURATION: 97 % | HEART RATE: 54 BPM | TEMPERATURE: 98.4 F | RESPIRATION RATE: 12 BRPM | SYSTOLIC BLOOD PRESSURE: 122 MMHG | WEIGHT: 217 LBS

## 2019-09-21 DIAGNOSIS — S61.216A LACERATION OF RIGHT LITTLE FINGER WITHOUT FOREIGN BODY WITHOUT DAMAGE TO NAIL, INITIAL ENCOUNTER: Primary | ICD-10-CM

## 2019-09-21 PROCEDURE — 99213 OFFICE O/P EST LOW 20 MIN: CPT | Performed by: PHYSICIAN ASSISTANT

## 2019-09-21 RX ORDER — DOXYCYCLINE 100 MG/1
100 CAPSULE ORAL
COMMUNITY
End: 2022-01-25

## 2019-09-21 NOTE — NURSING NOTE
Chief Complaint   Patient presents with     Urgent Care     Laceration     Pt cut his right little finger on a new piece of lawn equipment yesterday, around 2:00 in the afternoon.  He cleaned it with water and has put antibiotic ointment on it.  He was just not sure if  he should have it checked.  Last Tetanus shot was 11/15.     Initial /80 (BP Location: Left arm, Patient Position: Sitting, Cuff Size: Adult Regular)   Pulse 54   Temp 98.4  F (36.9  C) (Oral)   Resp 12   Wt 98.4 kg (217 lb)   SpO2 97%  There is no height or weight on file to calculate BMI..  BP completed using cuff size: regular  Tameka Pruitt R.N.

## 2019-09-21 NOTE — PROGRESS NOTES
SUBJECTIVE:     Chief Complaint   Patient presents with     Urgent Care     Laceration     Pt cut his right little finger on a new piece of lawn equipment yesterday, around 2:00 in the afternoon.  He cleaned it with water and has put antibiotic ointment on it.  He was just not sure if  he should have it checked.  Last Tetanus shot was 11/15.     Benjamin Hooper is a 64 year old male who presents to the clinic with a laceration on the right pinkly finger  sustained 24 hour(s) ago.  This is a non-work related, self-inflicted and accidental injury.    Mechanism of injury: putting together lawn aerator yesterday and metal piece cut finger in semicircular flap.  States that he washed out for a period of time and flap was placed flat.  Topical med and bandage applied.  Today states that looks pretty good today and is not open.  Has no difficulty moving and does not open when makes a fist.  Able to flex and extend finger .    Associated symptoms: Denies numbness, weakness, or loss of function  Last tetanus booster within 10 years: yes 4 years ago    EXAM:   The patient appears today in alert,no apparent distress distress  VITALS: /80 (BP Location: Left arm, Patient Position: Sitting, Cuff Size: Adult Regular)   Pulse 54   Temp 98.4  F (36.9  C) (Oral)   Resp 12   Wt 98.4 kg (217 lb)   SpO2 97%     Size of laceration: 1 centimeters  Characteristics of the laceration: clean and semi-circular no active bleeding noted.  Flap adhered and unable to open wound.  No surrounding redness or drainage to suggest infection    Tendon function intact: yes  Sensation to light touch intact: yes  Pulses intact: yes  Picture included in patient's chart: no    Assessment:  Laceration of right little finger without foreign body without damage to nail, initial encounter    PLAN:  No repair needed and seems to be healing well.  Tendon function intact.  No infection.  Wound cleaned and bandaged and signs of infection reviewed.  SHELLY  current and no update needed. Follow-up with PCP as needed

## 2020-02-24 ENCOUNTER — HEALTH MAINTENANCE LETTER (OUTPATIENT)
Age: 65
End: 2020-02-24

## 2020-08-28 ENCOUNTER — OFFICE VISIT - HEALTHEAST (OUTPATIENT)
Dept: FAMILY MEDICINE | Facility: CLINIC | Age: 65
End: 2020-08-28

## 2020-08-28 DIAGNOSIS — M72.2 PLANTAR FASCIITIS: ICD-10-CM

## 2020-08-28 ASSESSMENT — MIFFLIN-ST. JEOR: SCORE: 1778.7

## 2020-12-13 ENCOUNTER — HEALTH MAINTENANCE LETTER (OUTPATIENT)
Age: 65
End: 2020-12-13

## 2021-02-17 ENCOUNTER — COMMUNICATION - HEALTHEAST (OUTPATIENT)
Dept: FAMILY MEDICINE | Facility: CLINIC | Age: 66
End: 2021-02-17

## 2021-02-24 ENCOUNTER — OFFICE VISIT - HEALTHEAST (OUTPATIENT)
Dept: FAMILY MEDICINE | Facility: CLINIC | Age: 66
End: 2021-02-24

## 2021-02-24 ENCOUNTER — COMMUNICATION - HEALTHEAST (OUTPATIENT)
Dept: FAMILY MEDICINE | Facility: CLINIC | Age: 66
End: 2021-02-24

## 2021-02-24 DIAGNOSIS — E78.00 PURE HYPERCHOLESTEROLEMIA: ICD-10-CM

## 2021-02-24 DIAGNOSIS — L71.9 ROSACEA: ICD-10-CM

## 2021-02-24 DIAGNOSIS — D58.2 ELEVATED FERRITIN, HEMOGLOBIN, AND RED BLOOD CELL COUNT (H): ICD-10-CM

## 2021-02-24 DIAGNOSIS — R79.89 ELEVATED FERRITIN, HEMOGLOBIN, AND RED BLOOD CELL COUNT (H): ICD-10-CM

## 2021-02-24 DIAGNOSIS — I10 ESSENTIAL HYPERTENSION: ICD-10-CM

## 2021-02-24 DIAGNOSIS — R71.8 ELEVATED FERRITIN, HEMOGLOBIN, AND RED BLOOD CELL COUNT (H): ICD-10-CM

## 2021-02-24 DIAGNOSIS — Z00.00 ROUTINE GENERAL MEDICAL EXAMINATION AT A HEALTH CARE FACILITY: ICD-10-CM

## 2021-02-24 ASSESSMENT — MIFFLIN-ST. JEOR: SCORE: 1773.03

## 2021-03-31 ENCOUNTER — RECORDS - HEALTHEAST (OUTPATIENT)
Dept: ADMINISTRATIVE | Facility: OTHER | Age: 66
End: 2021-03-31

## 2021-04-17 ENCOUNTER — HEALTH MAINTENANCE LETTER (OUTPATIENT)
Age: 66
End: 2021-04-17

## 2021-05-21 ENCOUNTER — RECORDS - HEALTHEAST (OUTPATIENT)
Dept: ADMINISTRATIVE | Facility: OTHER | Age: 66
End: 2021-05-21

## 2021-05-31 VITALS — WEIGHT: 206.8 LBS | BODY MASS INDEX: 28.05 KG/M2

## 2021-05-31 VITALS — WEIGHT: 212 LBS | BODY MASS INDEX: 28.75 KG/M2

## 2021-06-04 VITALS
WEIGHT: 214 LBS | DIASTOLIC BLOOD PRESSURE: 82 MMHG | BODY MASS INDEX: 28.99 KG/M2 | HEIGHT: 72 IN | SYSTOLIC BLOOD PRESSURE: 130 MMHG | RESPIRATION RATE: 14 BRPM | HEART RATE: 64 BPM

## 2021-06-05 VITALS
HEIGHT: 73 IN | BODY MASS INDEX: 27.96 KG/M2 | RESPIRATION RATE: 12 BRPM | HEART RATE: 66 BPM | DIASTOLIC BLOOD PRESSURE: 68 MMHG | OXYGEN SATURATION: 99 % | WEIGHT: 211 LBS | SYSTOLIC BLOOD PRESSURE: 122 MMHG | TEMPERATURE: 98.2 F

## 2021-06-10 NOTE — PROGRESS NOTES
"Assessment/Plan:    Plantar fasciitis  We discussed conservative cares.  I suggested he get an over-the-counter orthotic for quality and limit he is right now.  He will try some anti-inflammatories.  If not improving, consider physical therapy or podiatry/orthopedics.  The patient will let us know in approximately 4 weeks if he is struggling.  We discussed utility of an x-ray given the acuity of onset we decided to defer.     Return in about 4 weeks (around 9/25/2020) for recheck if not improving.    Venu Dugan MD  _______________________________    Chief Complaint   Patient presents with     Foot Pain     Left heel pain - x 10 days. Sudden onset, but no known injury.      Subjective: Benjamin Hooper is a 65 y.o. year old male who I have seen in clinic before who presents with the following acute complaint(s):    Left heel pain:   - duration: 10 days   - no trauma   - worse in the morning or after resting.  Better with walking,   - \"like a nail\" into the bottom of the food.   - no recent changes in activity.   - he used to be a runner. Now walks on treadmill   - palliative: orthotics     ROS: Complete review of systems obtained.  Pertinent items are listed above.     The following portions of the patient's history were reviewed and updated as appropriate: allergies, current medications, past medical history and problem list.     Objective:   /82   Pulse 64   Resp 14   Ht 6' (1.829 m)   Wt 214 lb (97.1 kg)   BMI 29.02 kg/m    Gen: nad:  Msk: pain with medial insertion of the plantar fascia.  Other bones are non-tender.  Walks with normal gait.    No results found for this or any previous visit (from the past 24 hour(s)).  No results found.    Additional History from Old Records Summarized (2): no  Decision to Obtain Records (1): no  Radiology Tests Summarized or Ordered (1): no  Labs Reviewed or Ordered (1): no  Medicine Test Summarized or Ordered (1): no  Independent Review of EKG or X-RAY(2 " each): no    This note has been dictated using voice recognition software. Any grammatical or context distortions are unintentional and inherent to the software

## 2021-06-10 NOTE — PATIENT INSTRUCTIONS - HE
Supportive footwear    Superfeet?  Custom?    Physical therapy?      Aleve (220 mg) twice daily for 7-10 days.  Take with food    Consider podiatry, orthopaedics, sports medicine

## 2021-06-14 NOTE — PROGRESS NOTES
Assessment/Plan:    Claustrophobia  New complaint for this patient today.  Evolving claustrophobia, agoraphobia and anxiety.  Episodic and somewhat predictable.  The patient has found some relief with minuscule doses of Valium as prescribed at the emergency department last month.  He is highly sensitive to the possibility of becoming addicted to a controlled substance.  -Trial of hydroxyzine.  -Referral for psychotherapy.  -Consider intermittent as needed use of benzodiazepines, specifically when flying in the future if the above is not effective.    HTN (hypertension)  Normotensive today.  Continue current plan.  Reviewed the electrolyte and kidney function testing completed on 12/17 through the [facility] system.    Hyperbilirubinemia  As part of an abdominal pain workup, the patient had a normal comprehensive metabolic panel with mildly elevated bilirubin at 1.6.  I do not have access to a comparison measurement in the past.  However, given that he has no other abnormalities I am recommending that we recheck his bilirubin in the spring/summer when we obtain repeat fasting lab work before additional testing and workup.  We discussed signs and symptoms that should prompt earlier follow-up such as jaundice or scleral icterus.    Venu Dugan MD  _______________________________    Chief Complaint   Patient presents with     Follow-up     urgent care-[facility]      Anxiety     Subjective: Benjamin Hooper is a 62 y.o. year old male who I have seen in clinic before who presents with the following acute complaint(s):    Abdominal pain follow-up:   -This patient was seen at an urgent care on 12/17.  I reviewed th the diagnostics from the day.  There is not an actual clinic note available via care everywhere.  The patient had a CBC which was normal.  He had a mild elevation of bilirubin in isolation.  Otherwise his compressive metabolic panel was normal.  Analysis was normal.  Abdominal x-ray was normal except  for a bowel gas pattern.   - overall, his abdominal pain is improved.  He has been taking miralax and bowel movemtns have improved.   - panic symptoms: cold and hot flashes.  BP elevated.   -The patient is in clinic today because of concerns for elevated bilirubin.  A conference of metabolic panel was obtained as part of the workup.  His bilirubin was 1.6.  He has no history of jaundice.  No history of excessive alcohol consumption.  No history of hepatitis.  He has never noted that he has had abnormal AST and ALT measurements in the past.  He has no recollection of previously elevated bilirubins and he is unsure if this is been checked in the past.     Anxiety:   - 30+ years of intermittent/evolving claustrophobia (worse with flying).  He feels like he needs to get off the plane immediatetly.  Difficult to control.  He has had similar symptoms in crowd on [place].  Time is a factor.  Recently retired. Health problems (vision and nose bleed).  He had rhinorocket. Was prescribed benzos.  He has been cutting valium tabs into little pieces.  Helpful.        ROS: Complete review of systems obtained.  Pertinent items are listed above.     The following portions of the patient's history were reviewed and updated as appropriate: allergies, current medications, past medical history and problem list.     Objective:   /86 (Patient Site: Left Arm, Patient Position: Sitting, Cuff Size: Adult Regular)  Pulse 64  Temp 97.8  F (36.6  C) (Oral)   Wt 212 lb (96.2 kg)  BMI 28.75 kg/m2  General: No acute distress, pleasant.  Psych: Normal affect.  Normal rate of speech.  No tangentiality.  No suicidality.  Thinking is logical.    Records reviewed as described above.    No results found for this or any previous visit (from the past 24 hour(s)).  No results found.    Additional History from Old Records Summarized (2): yes  Decision to Obtain Records (1): yes  Radiology Tests Summarized or Ordered (1): yes  Labs Reviewed or  Ordered (1): yes  Medicine Test Summarized or Ordered (1): no  Independent Review of EKG or X-RAY(2 each): no    This note has been dictated using voice recognition software. Any grammatical or context distortions are unintentional and inherent to the software

## 2021-06-15 NOTE — PROGRESS NOTES
"Assessment/Plan:    Problem List Items Addressed This Visit        ENT/CARD/PULM/ENDO Problems    Hyperlipidemia - Primary     The patient is due for annual fasting lab work.  He is fasting today.  We will obtain a lipid profile.  Patient is on 20 mg of atorvastatin.  We spent time discussing the cardia vascular risk model is it pertains to making recommendations.         Relevant Orders    Lipid Profile    Hepatic Profile       Other    Agoraphobia    Claustrophobia     Hydroxyzine has been helpful.  Dose is ~10 mg.   - continue CBT   - adjust hydroxyzine to 10 mg.   - Reviewed PHQ9 and GAD7 as shown in the flowsheet.         Hyperbilirubinemia     Recheck hepatic profile today.  Anticipate mildly elevated bilirubin.  If consistent with previous measurement, no additional testing required.         Relevant Orders    Hepatic Profile        Greater than 25 minutes of total time was spent with patient of which greater than 50% was spent on counseling and coordination of care.    Venu Dugan MD  _______________________________    Chief Complaint   Patient presents with     Follow-up     lab work and anxiety      Subjective: Benjamin Hooper is a 62 y.o. year old male who returns to clinic for the following chronic complaints/concerns:     anxiety:   - he had an episode of anxiety in late December.  Trial of hydroxyzine.  \"Felt closed in late evenings.\"  Helped with anxiety.  \"Knocked for loop.\"  Some grogginess the next morning.  He tried 1/2 tab which also worked and did not cause as much grogginess.  Has started therapy.  The patient saw a psychiatrist in early January.  Now working with [facility].  He has contacted a psychologist with a group in [place]  He will proceed with the [place] group.      Hyperbilirubinemia: Patient would like to have his bilirubin checked.    Review of systems is negative except for as shown in the HPI.    The following portions of the patient's history were reviewed and updated as " appropriate: allergies, current medications, past medical history and problem list.    Objective:    weight is 206 lb 12.8 oz (93.8 kg). His oral temperature is 97.4  F (36.3  C). His blood pressure is 128/80 and his pulse is 68.   General: No acute distress  Psych: Normal affect.  Normal rate of speech.  Attention telemetry.  Thinking is logical.    No results found for this or any previous visit (from the past 24 hour(s)).    Additional History from Old Records Summarized (2): no  Decision to Obtain Records (1): no  Radiology Tests Summarized or Ordered (1): no  Labs Reviewed or Ordered (1): yes  Medicine Test Summarized or Ordered (1): no  Independent Review of EKG or X-RAY(2 each): no    This note has been dictated using voice recognition software. Any grammatical or context distortions are unintentional and inherent to the software

## 2021-06-15 NOTE — PROGRESS NOTES
Assessment and Plan:     Patient has been advised of split billing requirements and indicates understanding: Yes    Routine general medical examination at a health care facility  Welcome to Medicare visit.  Patient is looking to formalize his primary care at this location.  We reviewed the records from his recent care through [facility] system.  The electronic health record was updated.  Patient has a plan to receiving the COVID-19 vaccination.  Recent laboratory testing was reviewed.  We will plan for an annual exam in 6-12 months.  All other conditions are stable.  Blood pressure well controlled.  He is up-to-date with preventative health recommendations.    Elevated ferritin, hemoglobin, and red blood cell count (H)  Recent work-up including JAK2 mutation, CBC, ultrasound results was reviewed and were essentially normal.  We will continue to observe for changes related to his hemoglobin and blood work.    The patient's current medical problems were reviewed.    I have had an Advance Directives discussion with the patient.  The following health maintenance schedule was reviewed with the patient and provided in printed form in the after visit summary:   Health Maintenance   Topic Date Due     HEPATITIS C SCREENING  1955     MEDICARE ANNUAL WELLNESS VISIT  02/24/2022     FALL RISK ASSESSMENT  02/24/2022     LIPID  02/01/2023     COLORECTAL CANCER SCREENING  07/31/2024     TD 18+ HE  11/27/2025     ADVANCE CARE PLANNING  02/24/2026     Pneumococcal Vaccine: 65+ Years  Completed     INFLUENZA VACCINE RULE BASED  Completed     ZOSTER VACCINES  Completed     Pneumococcal Vaccine: Pediatrics (0 to 5 Years) and At-Risk Patients (6 to 64 Years)  Aged Out     HEPATITIS B VACCINES  Aged Out        Subjective:   Chief Complaint: Benjamin Hooper is an 65 y.o. male here for a Welcome to Medicare visit.   HPI:      He is interested in getting COVID vaccination.  The has caused some anxiety.      BP: continues to check  BP daily.  No concerns for low BP.  He checks infrequently.      Review of Systems:  Please see above.  The rest of the review of systems are negative for all systems.    Patient Care Team:  Venu Dugan MD as PCP - General (Family Medicine)  Venu Dugan MD as Assigned PCP     Patient Active Problem List   Diagnosis     Mixed hyperlipidemia     Essential hypertension     Agoraphobia     Claustrophobia     Gilbert's disease     Routine general medical examination at a health care facility     Bursitis of hip     Lower urinary tract symptoms (LUTS)     Pain in thoracic spine     Elevated ferritin, hemoglobin, and red blood cell count (H)     Rosacea     Past Medical History:   Diagnosis Date     Cyclotropia 7/5/2018     Plantar fasciitis 8/28/2020      Past Surgical History:   Procedure Laterality Date     EYE SURGERY      due to double vision 2018     HERNIA REPAIR      2010      Family History   Problem Relation Age of Onset     Hypertension Mother      Cancer Mother         lung      Hypertension Father      Cancer Father         lung      Pulmonary embolism Sister      Breast cancer Sister      Other Brother         prostate issue-pt had TURP      Osteopenia Sister      Colon cancer Neg Hx      Prostate cancer Neg Hx       Social History     Socioeconomic History     Marital status:      Spouse name: Not on file     Number of children: Not on file     Years of education: Not on file     Highest education level: Not on file   Occupational History     Occupation: "[employer]     Employer: OTHER   Social Needs     Financial resource strain: Not on file     Food insecurity     Worry: Not on file     Inability: Not on file     Transportation needs     Medical: Not on file     Non-medical: Not on file   Tobacco Use     Smoking status: Never Smoker     Smokeless tobacco: Never Used   Substance and Sexual Activity     Alcohol use: Yes     Drug use: Not on file     Sexual activity: Not on file   Lifestyle  "    Physical activity     Days per week: Not on file     Minutes per session: Not on file     Stress: Not on file   Relationships     Social connections     Talks on phone: Not on file     Gets together: Not on file     Attends Temple service: Not on file     Active member of club or organization: Not on file     Attends meetings of clubs or organizations: Not on file     Relationship status: Not on file     Intimate partner violence     Fear of current or ex partner: Not on file     Emotionally abused: Not on file     Physically abused: Not on file     Forced sexual activity: Not on file   Other Topics Concern     Not on file   Social History Narrative     Not on file       Current Outpatient Medications   Medication Sig Dispense Refill     atorvastatin (LIPITOR) 20 MG tablet Take 1 tablet by mouth daily.        azelaic acid (FINACEA) 15 % gel Apply topically 2 (two) times a day. After skin is thoroughly washed and patted dry, gently but thoroughly massage a thin film of azelaic acid cream into the affected area twice daily, in the morning and evening.       losartan (COZAAR) 50 MG tablet Take 1 tablet by mouth daily.       minocycline (MINOCIN,DYNACIN) 100 MG capsule Take 100 mg by mouth daily. For Rosacea       MULTIVITAMIN ORAL Take 1 tablet by mouth daily.        tamsulosin (FLOMAX) 0.4 mg cap Take 0.4 mg by mouth Daily after breakfast.       hydrOXYzine (ATARAX) 10 MG tablet Take 1 tablet (10 mg total) by mouth 3 (three) times a day as needed for itching or anxiety. 30 tablet 3     No current facility-administered medications for this visit.       Objective:   Vital Signs:   Visit Vitals  /68 (Patient Site: Left Arm, Patient Position: Sitting, Cuff Size: Adult Regular)   Pulse 66   Temp 98.2  F (36.8  C) (Oral)   Resp 12   Ht 6' 0.5\" (1.842 m)   Wt 211 lb (95.7 kg)   SpO2 99% Comment: room air   BMI 28.22 kg/m      EKG: not indicated    VisionScreening:   Hearing Screening    125Hz 250Hz 500Hz 1000Hz " 2000Hz 3000Hz 4000Hz 6000Hz 8000Hz   Right ear:            Left ear:               Visual Acuity Screening    Right eye Left eye Both eyes   Without correction:      With correction: 10/10 10/10 10/10        PHYSICAL EXAM  Physical Exam  Vitals signs reviewed.   Constitutional:       General: He is not in acute distress.     Appearance: He is well-developed.   HENT:      Head: Normocephalic and atraumatic.      Right Ear: External ear normal.      Left Ear: External ear normal.      Nose: Nose normal.      Mouth/Throat:      Pharynx: No oropharyngeal exudate.   Eyes:      General: No scleral icterus.        Right eye: No discharge.         Left eye: No discharge.      Conjunctiva/sclera: Conjunctivae normal.      Pupils: Pupils are equal, round, and reactive to light.   Neck:      Musculoskeletal: Normal range of motion.      Thyroid: No thyromegaly.   Cardiovascular:      Rate and Rhythm: Normal rate and regular rhythm.      Heart sounds: Normal heart sounds. No murmur. No friction rub. No gallop.    Pulmonary:      Effort: Pulmonary effort is normal. No respiratory distress.      Breath sounds: Normal breath sounds. No wheezing.   Abdominal:      General: There is no distension.      Palpations: Abdomen is soft. There is no mass.      Tenderness: There is no abdominal tenderness.   Musculoskeletal: Normal range of motion.   Lymphadenopathy:      Cervical: No cervical adenopathy.   Skin:     General: Skin is warm.   Neurological:      Mental Status: He is alert and oriented to person, place, and time.      Cranial Nerves: No cranial nerve deficit.      Motor: No abnormal muscle tone.      Deep Tendon Reflexes: Reflexes are normal and symmetric.   Psychiatric:         Behavior: Behavior normal.         Thought Content: Thought content normal.         Judgment: Judgment normal.       Assessment Results 2/24/2021   Activities of Daily Living No help needed   Instrumental Activities of Daily Living No help needed    Mini Cog Total Score 5   Some recent data might be hidden     A Mini Cog score of 0-2 suggests the possibility of dementia, score of 3-5 suggests no dementia    Identified Health Risks:     Information regarding advance directives (living trejo), including where he can download the appropriate form, was provided to the patient via the AVS.

## 2021-06-18 NOTE — PATIENT INSTRUCTIONS - HE
Patient Instructions by Venu Dugan MD at 2/24/2021 11:00 AM     Author: Venu Dugan MD Service: -- Author Type: Physician    Filed: 2/24/2021 11:57 AM Encounter Date: 2/24/2021 Status: Addendum    : Venu Dugan MD (Physician)    Related Notes: Original Note by Venu Dugan MD (Physician) filed at 2/24/2021 11:12 AM         Patient Education   Understanding Advance Care Planning  Advance care planning is the process of deciding ones own future medical care. It helps ensure that if you cant speak for yourself, your wishes can still be carried out. The plan is a series of legal documents that note a persons wishes. The documents vary by state. Advance care planning may be done when a person has a serious illness that is expected to get worse. It may be done before major surgery. And it can help you and your family be prepared in case of a major illness or injury. Advance care planning helps with making decisions at these times.       A health care proxy is a person who acts as the voice of a patient when the patient cant speak for himself or herself. The name of this role varies by state. It may be called a Durable Medical Power of  or Durable Power of  for Healthcare. It may be called an agent, surrogate, or advocate. Or it may be called a representative or decision maker. It is an official duty that is identified by a legal document. The document also varies by state.    Why Is Advance Care Planning Important?  If a person communicates their healthcare wishes:    They will be given medical care that matches their values and goals.    Their family members will not be forced to make decisions in a crisis with no guidance.  Creating a Plan  Making an advance care plan is often done in 3 steps:    Thinking about ones wishes. To create an advance care plan, you should think about what kind of medical treatment you would want if you lose the ability to communicate. Are  there any situations in which you would refuse or stop treatment? Are there therapies you would want or not want? And whom do you want to make decisions for you? There are many places to learn more about how to plan for your care. Ask your doctor or  for resources.    Picking a health care proxy. This means choosing a trusted person to speak for you only when you cant speak for yourself. When you cannot make medical decisions, your proxy makes sure the instructions in your advance care plan are followed. A proxy does not make decisions based on his or her own opinions. They must put aside those opinions and values if needed, and carry out your wishes.    Filling out the legal documents. There are several kinds of legal documents for advance care planning. Each one tells health care providers your wishes. The documents may vary by state. They must be signed and may need to be witnessed or notarized. You can cancel or change them whenever you wish. Depending on your state, the documents may include a Healthcare Proxy form, Living Will, Durable Medical Power of , Advance Directive, or others.  The Familys Role  The best help a family can give is to support their loved ones wishes. Open and honest communication is vital. Family should express any concerns they have about the patients choices while the patient can still make decisions.    0106-4283 [employer]. [address] [place] [address], [place] [address]. All rights reserved. This information is not intended as a substitute for professional medical care. Always follow your healthcare professional's instructions.         Also, [employer] [place] offers a free, downloadable health care directive that allows you to share your treatment choices and personal preferences if you cannot communicate your wishes. It also allows you to appoint another person (called a health care agent) to make health care decisions if you are unable to do  so. You can download an advance directive by going here: http://[email or web address]/[email or web address]-choices.[email or web address]     Patient Education   Personalized Prevention Plan  You are due for the preventive services outlined below.  Your care team is available to assist you in scheduling these services.  If you have already completed any of these items, please share that information with your care team to update in your medical record.  Health Maintenance   Topic Date Due   ? HEPATITIS C SCREENING  1955   ? MEDICARE ANNUAL WELLNESS VISIT  02/24/2022   ? FALL RISK ASSESSMENT  02/24/2022   ? ADVANCE CARE PLANNING  12/21/2022   ? LIPID  02/01/2023   ? COLORECTAL CANCER SCREENING  07/31/2024   ? TD 18+ HE  11/27/2025   ? Pneumococcal Vaccine: 65+ Years  Completed   ? INFLUENZA VACCINE RULE BASED  Completed   ? ZOSTER VACCINES  Completed   ? Pneumococcal Vaccine: Pediatrics (0 to 5 Years) and At-Risk Patients (6 to 64 Years)  Aged Out   ? HEPATITIS B VACCINES  Aged Out

## 2021-06-23 NOTE — TELEPHONE ENCOUNTER
Left message to call back for: Benjamin   Information to relay to patient:  Pt is due for a blood pressure check visit, please schedule an appointment with Dr. Sheriff when he calls back    Jasmina Rush LPN

## 2021-09-03 ENCOUNTER — TELEPHONE (OUTPATIENT)
Dept: FAMILY MEDICINE | Facility: CLINIC | Age: 66
End: 2021-09-03

## 2021-09-03 ENCOUNTER — OFFICE VISIT (OUTPATIENT)
Dept: FAMILY MEDICINE | Facility: CLINIC | Age: 66
End: 2021-09-03
Payer: MEDICARE

## 2021-09-03 VITALS
SYSTOLIC BLOOD PRESSURE: 138 MMHG | DIASTOLIC BLOOD PRESSURE: 80 MMHG | HEART RATE: 62 BPM | HEIGHT: 73 IN | OXYGEN SATURATION: 97 % | BODY MASS INDEX: 27.77 KG/M2 | WEIGHT: 209.5 LBS

## 2021-09-03 DIAGNOSIS — M79.671 RIGHT FOOT PAIN: Primary | ICD-10-CM

## 2021-09-03 PROBLEM — Z00.00 ROUTINE GENERAL MEDICAL EXAMINATION AT A HEALTH CARE FACILITY: Status: ACTIVE | Noted: 2021-02-24

## 2021-09-03 PROBLEM — L71.9 ROSACEA: Status: ACTIVE | Noted: 2021-02-24

## 2021-09-03 PROBLEM — R39.9 LOWER URINARY TRACT SYMPTOMS (LUTS): Status: ACTIVE | Noted: 2018-10-10

## 2021-09-03 PROCEDURE — 99213 OFFICE O/P EST LOW 20 MIN: CPT | Performed by: FAMILY MEDICINE

## 2021-09-03 RX ORDER — TAMSULOSIN HYDROCHLORIDE 0.4 MG/1
0.4 CAPSULE ORAL DAILY
COMMUNITY
Start: 2021-07-25 | End: 2023-12-04

## 2021-09-03 RX ORDER — HYDROXYZINE HYDROCHLORIDE 10 MG/1
10 TABLET, FILM COATED ORAL DAILY PRN
COMMUNITY
End: 2022-05-05

## 2021-09-03 RX ORDER — AZELAIC ACID 0.15 G/G
1 GEL TOPICAL DAILY PRN
COMMUNITY
End: 2022-10-10

## 2021-09-03 RX ORDER — MULTIVIT WITH MINERALS/LUTEIN
1 TABLET ORAL DAILY
COMMUNITY
Start: 2019-01-01

## 2021-09-03 ASSESSMENT — MIFFLIN-ST. JEOR: SCORE: 1766.23

## 2021-09-03 NOTE — PROGRESS NOTES
"Assessment/Plan:    Right foot pain  Right proximal fifth metatarsal pain.  There is no visible abnormality nor discoloration/derangement of the overlying skin.  Distant history of fracture of the midshaft (9-year-old child).  Reportedly he had full healing.  No obvious trauma or is precipitating incident.  X-ray was not completed today as we do not have an x-ray tech in the building.  Patient is an active/avid gerry.  -We reviewed potential differential.  He does not have risk factors for stress fracture although this would loosely fit with the symptoms that he has described.  This does not appear to be an atypical bunion.  This also does not appear to be a typical plantar fasciitis.  -Discussed anti-inflammatories.  -Referral to orthopedic/podiatry placed.     Return in about 4 weeks (around 10/1/2021).    Venu Dugan MD  _______________________________    Chief Complaint   Patient presents with     Musculoskeletal Problem     Rt foot pain x 6 weeks     Subjective: Benjamin Hooper is a 66 year old year old male who I have seen in clinic before who presents with the following acute complaint(s):    Right foot pain:  -Duration: 6 weeks.  No past episodes.  No trauma.  Pain is worse with the first few steps.  Better in the day. Worse at night. No swelling.     - palliative: topical voltarean.     - \"nerve ending buzzing.\"  -Distant history of foot fracture.  5th metatarsal fracture when he was 11-12 years old.    - active walker in the fall.  Hunting.      Review of Systems   Constitutional:        Review of systems negative except as noted in the HPI.   All other systems reviewed and are negative.       Histories reviewed:                 Objective:   /80 (BP Location: Left arm, Patient Position: Sitting, Cuff Size: Adult Regular)   Pulse 62   Ht 1.842 m (6' 0.5\")   Wt 95 kg (209 lb 8 oz)   SpO2 97%   BMI 28.02 kg/m    Physical Exam  General: No acute distress  MSK/skin: The feet bilaterally are " without abnormalities.  Dorsal pedal pulses present bilaterally.  Patient walks with normal gait.  The proximal head of the fifth metatarsal on the right side is quite tender to palpation.  There is no swelling or crepitus.  The overlying skin appears normal and healthy.  The contralateral side also has some mild tenderness.  The distal and midshaft portions of the fifth metatarsal bilaterally are nontender.  The plantar foot is nontender.  The insertion on the medial side of the plantar fascia to the calcaneus is nontender.  The right side Achilles tendon is nontender to palpation.  Strength 5/5.  Some increase of pain with extroversion of the foot that is resisted on the right side.  None with introversion.      No results found for this or any previous visit (from the past 48 hour(s)).  No results found for this visit on 09/03/21.    This note has been dictated using voice recognition software. Any grammatical or context distortions are unintentional and inherent to the software

## 2021-09-03 NOTE — TELEPHONE ENCOUNTER
Left message to call back for: Lamine   Information to relay to patient: pt is on our scheduled today for foot pain, at this time we do not have Xray today-please rescheduled him to next week when he calls back.    Thank you,  Jasmina Rush LPN  9/3/2021  10:30 AM

## 2021-09-03 NOTE — TELEPHONE ENCOUNTER
Pt wants to come see [facility] today even though we do not have Xray.  He realizes he may need to go to [facility] or another clinic for this.  He is fine with that.

## 2021-09-03 NOTE — ASSESSMENT & PLAN NOTE
Right proximal fifth metatarsal pain.  There is no visible abnormality nor discoloration/derangement of the overlying skin.  Distant history of fracture of the midshaft (9-year-old child).  Reportedly he had full healing.  No obvious trauma or is precipitating incident.  X-ray was not completed today as we do not have an x-ray tech in the building.  Patient is an active/avid gerry.  -We reviewed potential differential.  He does not have risk factors for stress fracture although this would loosely fit with the symptoms that he has described.  This does not appear to be an atypical bunion.  This also does not appear to be a typical plantar fasciitis.  -Discussed anti-inflammatories.  -Referral to orthopedic/podiatry placed.

## 2021-09-14 ENCOUNTER — OFFICE VISIT (OUTPATIENT)
Dept: PODIATRY | Facility: CLINIC | Age: 66
End: 2021-09-14
Attending: FAMILY MEDICINE
Payer: MEDICARE

## 2021-09-14 ENCOUNTER — ANCILLARY PROCEDURE (OUTPATIENT)
Dept: GENERAL RADIOLOGY | Facility: CLINIC | Age: 66
End: 2021-09-14
Attending: PODIATRIST
Payer: MEDICARE

## 2021-09-14 VITALS — RESPIRATION RATE: 20 BRPM | DIASTOLIC BLOOD PRESSURE: 78 MMHG | SYSTOLIC BLOOD PRESSURE: 138 MMHG

## 2021-09-14 DIAGNOSIS — M76.71 PERONEAL TENDINITIS OF RIGHT LOWER EXTREMITY: Primary | ICD-10-CM

## 2021-09-14 DIAGNOSIS — M79.671 RIGHT FOOT PAIN: Primary | ICD-10-CM

## 2021-09-14 DIAGNOSIS — M79.671 RIGHT FOOT PAIN: ICD-10-CM

## 2021-09-14 PROCEDURE — 73630 X-RAY EXAM OF FOOT: CPT | Mod: TC | Performed by: PODIATRIST

## 2021-09-14 PROCEDURE — 99203 OFFICE O/P NEW LOW 30 MIN: CPT | Performed by: PODIATRIST

## 2021-09-14 RX ORDER — NAPROXEN 500 MG/1
500 TABLET ORAL 2 TIMES DAILY WITH MEALS
Qty: 28 TABLET | Refills: 0 | Status: SHIPPED | OUTPATIENT
Start: 2021-09-14 | End: 2022-01-25

## 2021-09-14 NOTE — PATIENT INSTRUCTIONS
"Peroneal Tendon Injuries  Definition   There are two tendons that make up the peroneal tendons, brevis and longus.  By definition a tendon is a band of tissue that connects muscle to bone. The peroneal brevis tendon attaches to the base of the 5th metatarsal and the peroneal longus travels under the foot and attaches to the 1st metatarsal.  The peroneal tendons function to judith (outward movement) the foot.   Peroneal Tendon Injuries   Peroneal tendon injuries vary from minimally debilitating to severe pain. Three common injuries include:  1.) Tendonitis: Defined as an inflammation of one or both tendons caused by repetitive use of the tendons.  This would also include ankle sprains. Symptoms often include pain and swelling.     2.) Tears:  A tear within the tendon can occur as a result of direct trauma or chronic, repetitive motion causing injury.    Symptoms are usually more pronounced than tendonitis and can lead to ankle instability.    3.) Tendonosis (degeneration):  Degeneration of the tendon follows a long period of overuse.  This is a change in the physical make-up of the tendon which leads to an overall structural weakening of the tendon.    4.) Subluxation:  Both peroneal tendons begin above the ankle and course down toward the lateral (outside) ankle joint bone (fibula).  Subluxation occurs when one or both of the tendons move out of the natural groove in the fibula.  Often symptoms include feeling a \"popping\" or noticing the tendon move over the ankle joint bone.     Diagnosis  Your Podiatric Foot and Ankle Surgeon will evaluate for any weakness, pain or instability around the ankle joint.  Based on the severity of the injury a treatment plan will be recommended.  This may or may not include an MRI to further access the pathology.   Treatment   Low grade injuries such as tendonitis typically respond well to R.I.C.E. (Rice, Ice, Compression, Elevation).  However, long standing injuries or traumatic " injuries may require physical therapy or surgical repair.

## 2021-09-14 NOTE — PROGRESS NOTES
FOOT AND ANKLE SURGERY/PODIATRY CONSULT NOTE         ASSESSMENT: Insertional Peroneal Brevis Tendinitis right       TREATMENT:  -There is pain to palpation at insertion of peroneal brevis to 5th metatarsal base right foot. No pain along remaining 5th metatarsal. I reviewed the patient's x-rays which are negative for fracture or cortical reaction. Moderate bunion deformity.     -I reviewed the above with him today and discussed etiology of peroneal tendinitis including overuse and influence of shoe gear. I will start him on Naproxen and recommend reduced walking. Also recommend use of a CAM boot, he declines. We will consider use of the CAM boot and oral prednisone if symptoms do not improve.    -Patient's questions invited and answered. He was encouraged to call my office with any further questions or concerns.     Yonatan Sanchez DPM  [facility] Podiatry/Foot & Ankle Surgery  [phone]      HPI: I was asked to see Benjamin Hooper today for lateral right foot pain. The patient states he started having pain several weeks ago, denies trauma. He admits staying active walking with and without his dog. He has noticed wearing down of a pair of shoes which have recently been replaced.       Past Medical History:   Diagnosis Date     Arthritis 1/1/2015    Base of thumb     Cancer (H) 1/1/2001    AFX skin tumor on chin     Cyclotropia 7/5/2018     Diplopia      H/O magnetic resonance imaging     MRI 10/02/17 with and without contrast (outside imaging) without abnormality     HLD (hyperlipidemia)      Hypertension      Nonsenile cataract      Plantar fasciitis 8/28/2020     Trauma     Trauma as child 1-2 years of age, dropped onto the ground. Unknown how fell but developed a black tooth after being dropped.         Social History     Socioeconomic History     Marital status:      Spouse name: Not on file     Number of children: Not on file     Years of education: Not on file     Highest education  level: Not on file   Occupational History     Not on file   Tobacco Use     Smoking status: Never Smoker     Smokeless tobacco: Never Used   Substance and Sexual Activity     Alcohol use: Yes     Drug use: Never     Sexual activity: Not Currently     Birth control/protection: None   Other Topics Concern     Parent/sibling w/ CABG, MI or angioplasty before 65F 55M? No   Social History Narrative     Not on file     Social Determinants of Health     Financial Resource Strain:      Difficulty of Paying Living Expenses:    Food Insecurity:      Worried About Running Out of Food in the Last Year:      Ran Out of Food in the Last Year:    Transportation Needs:      Lack of Transportation (Medical):      Lack of Transportation (Non-Medical):    Physical Activity:      Days of Exercise per Week:      Minutes of Exercise per Session:    Stress:      Feeling of Stress :    Social Connections:      Frequency of Communication with Friends and Family:      Frequency of Social Gatherings with Friends and Family:      Attends [employer] Services:      Active Member of Clubs or Organizations:      Attends Club or Organization Meetings:      Marital Status:    Intimate Partner Violence:      Fear of Current or Ex-Partner:      Emotionally Abused:      Physically Abused:      Sexually Abused:             Allergies   Allergen Reactions     Mupirocin Hives     Hives, rash and itching when mupirocin used in nares while taking oral doxycycline (no previous reaction when using mupirocin alone or doxycycline alone)     Cats Difficulty breathing and Itching     Grass Extracts [Gramineae Pollens] Itching     cough         MEDICATIONS:   Current Outpatient Medications   Medication     atorvastatin (LIPITOR) 20 MG tablet     azelaic acid (FINACIA) 15 % external gel     doxycycline hyclate (VIBRAMYCIN) 100 MG capsule     hydrOXYzine (ATARAX) 10 MG tablet     losartan (COZAAR) 50 MG tablet     multivitamin (CENTRUM SILVER) tablet     naproxen  (NAPROSYN) 500 MG tablet     tamsulosin (FLOMAX) 0.4 MG capsule     UNABLE TO FIND     No current facility-administered medications for this visit.        Family History   Problem Relation Age of Onset     Hypertension Mother      Cancer Mother         lung      Other Cancer Mother         Lung cancer     Osteoporosis Mother      Hypertension Father      Cancer Father         lung      Other Cancer Father         Lung cancer     Pulmonary Embolism Sister      Breast Cancer Sister      Osteoporosis Sister      Other - See Comments Brother         prostate issue-pt had TURP      Osteopenia Sister      Osteoporosis Sister      Amblyopia No family hx of      Strabismus No family hx of      Colon Cancer No family hx of      Prostate Cancer No family hx of           Review of Systems - 10 point Review of Systems is negative except for right foot pain which is noted in HPI.    OBJECTIVE:  Appearance: alert, well appearing, and in no distress.    VITAL SIGNS: There were no vitals taken for this visit.      General appearance: Patient is alert and fully cooperative with history & exam.  No sign of distress is noted during the visit.     Psychiatric: Affect is pleasant & appropriate.  Patient appears motivated to improve health.     Respiratory: Breathing is regular & unlabored while sitting.     HEENT: Hearing is intact to spoken word.  Speech is clear.  No gross evidence of visual impairment that would impact ambulation.      Vascular: Dorsalis pedis and posterior tibial pulses are palpable. There is pedal hair growth right.  CFT < 3 sec from anterior tibial surface to distal digits right. There is no appreciable edema noted.  Dermatologic: Turgor and texture are within normal limits. No coloration or temperature changes. No primary or secondary lesions noted.  Neurologic: All epicritic and proprioceptive sensations are grossly intact right.  Musculoskeletal: Pain to palpation at insertion of peroneal brevis to 5th  metatarsal base right foot. No pain along remaining 5th metatarsal.

## 2021-09-14 NOTE — LETTER
9/14/2021         RE: Benjamin Hooper  [address] [address] [place] [id number]        Dear Colleague,    Thank you for referring your patient, Benjamin Hooper, to the [facility]. Please see a copy of my visit note below.          FOOT AND ANKLE SURGERY/PODIATRY CONSULT NOTE         ASSESSMENT: Insertional Peroneal Brevis Tendinitis right       TREATMENT:  -There is pain to palpation at insertion of peroneal brevis to 5th metatarsal base right foot. No pain along remaining 5th metatarsal. I reviewed the patient's x-rays which are negative for fracture or cortical reaction. Moderate bunion deformity.     -I reviewed the above with him today and discussed etiology of peroneal tendinitis including overuse and influence of shoe gear. I will start him on Naproxen and recommend reduced walking. Also recommend use of a CAM boot, he declines. We will consider use of the CAM boot and oral prednisone if symptoms do not improve.    -Patient's questions invited and answered. He was encouraged to call my office with any further questions or concerns.     Yonatan Sanchez DPM  [facility] Podiatry/Foot & Ankle Surgery  [phone]      HPI: I was asked to see Benjamin Hooper today for lateral right foot pain. The patient states he started having pain several weeks ago, denies trauma. He admits staying active walking with and without his dog. He has noticed wearing down of a pair of shoes which have recently been replaced.       Past Medical History:   Diagnosis Date     Arthritis 1/1/2015    Base of thumb     Cancer (H) 1/1/2001    AFX skin tumor on chin     Cyclotropia 7/5/2018     Diplopia      H/O magnetic resonance imaging     MRI 10/02/17 with and without contrast (outside imaging) without abnormality     HLD (hyperlipidemia)      Hypertension      Nonsenile cataract      Plantar fasciitis 8/28/2020     Trauma     Trauma as child 1-2 years of age, dropped onto the ground.  Unknown how fell but developed a black tooth after being dropped.         Social History     Socioeconomic History     Marital status:      Spouse name: Not on file     Number of children: Not on file     Years of education: Not on file     Highest education level: Not on file   Occupational History     Not on file   Tobacco Use     Smoking status: Never Smoker     Smokeless tobacco: Never Used   Substance and Sexual Activity     Alcohol use: Yes     Drug use: Never     Sexual activity: Not Currently     Birth control/protection: None   Other Topics Concern     Parent/sibling w/ CABG, MI or angioplasty before 65F 55M? No   Social History Narrative     Not on file     Social Determinants of Health     Financial Resource Strain:      Difficulty of Paying Living Expenses:    Food Insecurity:      Worried About Running Out of Food in the Last Year:      Ran Out of Food in the Last Year:    Transportation Needs:      Lack of Transportation (Medical):      Lack of Transportation (Non-Medical):    Physical Activity:      Days of Exercise per Week:      Minutes of Exercise per Session:    Stress:      Feeling of Stress :    Social Connections:      Frequency of Communication with Friends and Family:      Frequency of Social Gatherings with Friends and Family:      Attends [employer] Services:      Active Member of Clubs or Organizations:      Attends Club or Organization Meetings:      Marital Status:    Intimate Partner Violence:      Fear of Current or Ex-Partner:      Emotionally Abused:      Physically Abused:      Sexually Abused:             Allergies   Allergen Reactions     Mupirocin Hives     Hives, rash and itching when mupirocin used in nares while taking oral doxycycline (no previous reaction when using mupirocin alone or doxycycline alone)     Cats Difficulty breathing and Itching     Grass Extracts [Gramineae Pollens] Itching     cough         MEDICATIONS:   Current Outpatient Medications   Medication      atorvastatin (LIPITOR) 20 MG tablet     azelaic acid (FINACIA) 15 % external gel     doxycycline hyclate (VIBRAMYCIN) 100 MG capsule     hydrOXYzine (ATARAX) 10 MG tablet     losartan (COZAAR) 50 MG tablet     multivitamin (CENTRUM SILVER) tablet     naproxen (NAPROSYN) 500 MG tablet     tamsulosin (FLOMAX) 0.4 MG capsule     UNABLE TO FIND     No current facility-administered medications for this visit.        Family History   Problem Relation Age of Onset     Hypertension Mother      Cancer Mother         lung      Other Cancer Mother         Lung cancer     Osteoporosis Mother      Hypertension Father      Cancer Father         lung      Other Cancer Father         Lung cancer     Pulmonary Embolism Sister      Breast Cancer Sister      Osteoporosis Sister      Other - See Comments Brother         prostate issue-pt had TURP      Osteopenia Sister      Osteoporosis Sister      Amblyopia No family hx of      Strabismus No family hx of      Colon Cancer No family hx of      Prostate Cancer No family hx of           Review of Systems - 10 point Review of Systems is negative except for right foot pain which is noted in HPI.    OBJECTIVE:  Appearance: alert, well appearing, and in no distress.    VITAL SIGNS: There were no vitals taken for this visit.      General appearance: Patient is alert and fully cooperative with history & exam.  No sign of distress is noted during the visit.     Psychiatric: Affect is pleasant & appropriate.  Patient appears motivated to improve health.     Respiratory: Breathing is regular & unlabored while sitting.     HEENT: Hearing is intact to spoken word.  Speech is clear.  No gross evidence of visual impairment that would impact ambulation.      Vascular: Dorsalis pedis and posterior tibial pulses are palpable. There is pedal hair growth right.  CFT < 3 sec from anterior tibial surface to distal digits right. There is no appreciable edema noted.  Dermatologic: Turgor and texture are  within normal limits. No coloration or temperature changes. No primary or secondary lesions noted.  Neurologic: All epicritic and proprioceptive sensations are grossly intact right.  Musculoskeletal: Pain to palpation at insertion of peroneal brevis to 5th metatarsal base right foot. No pain along remaining 5th metatarsal.             Again, thank you for allowing me to participate in the care of your patient.        Sincerely,        Yonatan Sanchez DPM

## 2021-09-26 ENCOUNTER — HEALTH MAINTENANCE LETTER (OUTPATIENT)
Age: 66
End: 2021-09-26

## 2022-01-05 ENCOUNTER — TRANSFERRED RECORDS (OUTPATIENT)
Dept: HEALTH INFORMATION MANAGEMENT | Facility: CLINIC | Age: 67
End: 2022-01-05
Payer: MEDICARE

## 2022-01-25 ENCOUNTER — OFFICE VISIT (OUTPATIENT)
Dept: FAMILY MEDICINE | Facility: CLINIC | Age: 67
End: 2022-01-25
Payer: MEDICARE

## 2022-01-25 VITALS
HEIGHT: 73 IN | HEART RATE: 61 BPM | BODY MASS INDEX: 28.55 KG/M2 | OXYGEN SATURATION: 97 % | SYSTOLIC BLOOD PRESSURE: 132 MMHG | WEIGHT: 215.4 LBS | DIASTOLIC BLOOD PRESSURE: 80 MMHG

## 2022-01-25 DIAGNOSIS — N40.0 BENIGN PROSTATIC HYPERPLASIA WITHOUT URINARY OBSTRUCTION: ICD-10-CM

## 2022-01-25 DIAGNOSIS — D58.2 ELEVATED FERRITIN, HEMOGLOBIN, AND RED BLOOD CELL COUNT (H): ICD-10-CM

## 2022-01-25 DIAGNOSIS — R79.89 ELEVATED FERRITIN, HEMOGLOBIN, AND RED BLOOD CELL COUNT (H): ICD-10-CM

## 2022-01-25 DIAGNOSIS — F40.240 CLAUSTROPHOBIA: ICD-10-CM

## 2022-01-25 DIAGNOSIS — I10 ESSENTIAL HYPERTENSION: ICD-10-CM

## 2022-01-25 DIAGNOSIS — I48.0 PAROXYSMAL ATRIAL FIBRILLATION (H): Primary | ICD-10-CM

## 2022-01-25 DIAGNOSIS — R71.8 ELEVATED FERRITIN, HEMOGLOBIN, AND RED BLOOD CELL COUNT (H): ICD-10-CM

## 2022-01-25 PROCEDURE — 99214 OFFICE O/P EST MOD 30 MIN: CPT | Performed by: FAMILY MEDICINE

## 2022-01-25 RX ORDER — RIVAROXABAN 20 MG/1
TABLET, FILM COATED ORAL
COMMUNITY
Start: 2022-01-24 | End: 2022-02-03

## 2022-01-25 ASSESSMENT — MIFFLIN-ST. JEOR: SCORE: 1792.99

## 2022-01-25 NOTE — PROGRESS NOTES
"Assessment/Plan:    Paroxysmal atrial fibrillation (H)  This patient was cardioverted in the emergency room yesterday.  He had sudden onset of shortness of breath.  He was found to be in atrial fibrillation with RVR.  He has known first-degree heart block which was seen subsequently.  He is doing well today and is compliant with recommendations to take an anticoagulant.  [id number] score is 2.  We reviewed the cardiovascular work-up that he had back in [id number].  He does not have any known structural heart disease.  Blood pressure has been well controlled.  He has not had a formal sleep study although he has had overnight oximetry which has been without abnormalities.  For now, he is rate controlled and likely in normal sinus rhythm based on physical exam.  Continue Xarelto.  Referral placed for cardiology evaluation and recommendations going forward.    Essential hypertension  BP well controlled today.    35 minutes spent on the date of the encounter doing chart review, history and exam, documentation and further activities per the note    Return in about 4 weeks (around 2/22/2022).    Venu Dugan MD  _______________________________    Chief Complaint   Patient presents with     ER F/U     F/u from [facility] room yesterday for Arrhythmia     Subjective: Benjamin Hooper is a 66 year old year old male who returns to clinic for the following chronic complaints/concerns:     S/p cardioversion:   - he woke up yesterday feeling normal. He exercised.  He shoveled.     - he felt suddenly felt a change (8:30 - 9pm).  \"My heart rhythm didn't feel right.\"  They had a device at home that showed atrial fibrillation. HR at home was 157.  -  [facility] room: reviewed.    They pushed diltiazam that rate controlled.  He was then cardioverted successfully (required 2 attempts).      - 2015 SVT work-up at [facility].  TTE in 2014.    - today: \"Great.\"  Wonders about coffee.     - THU: most recent overnight pulse oximetry without " "abnormalites.  Wife notes that snores.  No witnessed apneic episodes.  (11/12/2019 - most recent overnight pulse ox PDF reviewed).  No formal PSG.       Review of Systems   Constitutional:        Review of systems negative except as noted in the HPI.   All other systems reviewed and are negative.     Reviewed history:               Objective:    height is 1.842 m (6' 0.5\") and weight is 97.7 kg (215 lb 6.4 oz). His blood pressure is 132/80 and his pulse is 61. His oxygen saturation is 97%.   Physical Exam  Vitals reviewed.   Constitutional:       Appearance: Normal appearance. He is not ill-appearing.   HENT:      Head: Normocephalic.      Right Ear: Tympanic membrane, ear canal and external ear normal.      Left Ear: Tympanic membrane, ear canal and external ear normal.      Nose: Nose normal.      Mouth/Throat:      Mouth: Mucous membranes are moist.      Pharynx: No oropharyngeal exudate or posterior oropharyngeal erythema.   Eyes:      General: No scleral icterus.        Right eye: No discharge.         Left eye: No discharge.      Conjunctiva/sclera: Conjunctivae normal.   Cardiovascular:      Rate and Rhythm: Normal rate and regular rhythm.      Heart sounds: Normal heart sounds. No murmur heard.  No friction rub. No gallop.    Pulmonary:      Effort: Pulmonary effort is normal. No respiratory distress.      Breath sounds: Normal breath sounds. No wheezing or rales.   Musculoskeletal:      Cervical back: Neck supple.   Lymphadenopathy:      Cervical: No cervical adenopathy.   Skin:     Findings: No rash.   Neurological:      General: No focal deficit present.      Mental Status: He is alert and oriented to person, place, and time.   Psychiatric:         Mood and Affect: Mood normal.       No flowsheet data found.  No flowsheet data found.  No flowsheet data found.  No flowsheet data found.  No results found for this or any previous visit (from the past 48 hour(s)).  No results found for this visit on " 01/25/22.    This note has been dictated using voice recognition software. Any grammatical or context distortions are unintentional and inherent to the software

## 2022-01-25 NOTE — ASSESSMENT & PLAN NOTE
This patient was cardioverted in the emergency room yesterday.  He had sudden onset of shortness of breath.  He was found to be in atrial fibrillation with RVR.  He has known first-degree heart block which was seen subsequently.  He is doing well today and is compliant with recommendations to take an anticoagulant.  [id number] score is 2.  We reviewed the cardiovascular work-up that he had back in [id number].  He does not have any known structural heart disease.  Blood pressure has been well controlled.  He has not had a formal sleep study although he has had overnight oximetry which has been without abnormalities.  For now, he is rate controlled and likely in normal sinus rhythm based on physical exam.  Continue Xarelto.  Referral placed for cardiology evaluation and recommendations going forward.

## 2022-02-03 ENCOUNTER — OFFICE VISIT (OUTPATIENT)
Dept: CARDIOLOGY | Facility: CLINIC | Age: 67
End: 2022-02-03
Payer: MEDICARE

## 2022-02-03 VITALS
WEIGHT: 211.3 LBS | RESPIRATION RATE: 12 BRPM | SYSTOLIC BLOOD PRESSURE: 130 MMHG | HEART RATE: 65 BPM | HEIGHT: 72 IN | DIASTOLIC BLOOD PRESSURE: 78 MMHG | TEMPERATURE: 98.4 F | OXYGEN SATURATION: 98 % | BODY MASS INDEX: 28.62 KG/M2

## 2022-02-03 DIAGNOSIS — I10 HYPERTENSION, UNSPECIFIED TYPE: Primary | ICD-10-CM

## 2022-02-03 DIAGNOSIS — E78.5 DYSLIPIDEMIA: ICD-10-CM

## 2022-02-03 DIAGNOSIS — I48.0 PAROXYSMAL ATRIAL FIBRILLATION (H): ICD-10-CM

## 2022-02-03 PROCEDURE — 99204 OFFICE O/P NEW MOD 45 MIN: CPT | Performed by: INTERNAL MEDICINE

## 2022-02-03 RX ORDER — RIVAROXABAN 20 MG/1
20 TABLET, FILM COATED ORAL
Qty: 90 TABLET | Refills: 3 | Status: SHIPPED | OUTPATIENT
Start: 2022-02-03 | End: 2023-02-02

## 2022-02-03 ASSESSMENT — MIFFLIN-ST. JEOR: SCORE: 1766.45

## 2022-02-03 NOTE — LETTER
2/3/2022    Venu Dugan MD  [address]  [facility] [address]    RE: Benjamin Baileyanika       Dear Colleague,     I had the pleasure of seeing Benjamin Hooper in the [facility].         [facility]   [address], [place] [address]   [email or web address]   OFFICE: [phone]          Thank you Dr. Dugan for asking the [facility] team to participate in the care of your patient, Benjamin Hooper.     Impression and Plan        1.  Atrial fibrillation (paroxysmal).  As noted below, Lamine has newly diagnosed paroxysmal atrial fibrillation. Specifically, Lamine had presented 24 January 2022 with subjective palpitations. He underwent direct-current cardioversion.  Lamine's [id number] Score is 2 yielding an annual embolic risk of 2.2-2.9%. He was started on rivaroxaban for CVA prophylaxis.  As noted below, Lamine was keenly aware of the rhythm disturbance. He reports no other subjective episodes. He has not had any recurrence. Parenthetically, he has been evaluated for sleep apnea in the past though overnight oximetry in the like did not reveal prominent sleep apnea. Laboratory studies were reviewed and no abnormalities of note. Plan:     Would advocate continuing rivaroxaban for now in the event of recurrence.     Echocardiogram (patient states that he is scheduled to have this performed already at the [facility] though may decide to have it done locally).     Further recommendations pending echocardiographic findings.     2.  Hypertension.  Blood pressure is fairly reasonable in the office today at 103/78 mmHg.     Continue losartan.      3.  Dyslipidemia.      Continue statin therapy.     Tentatively will arrange for follow-up in approximately 2-3 months.    30 minutes spent reviewing prior records (including documentation, laboratory studies, cardiac testing/imaging), interview with patient along with physical exam,  "planning, and subsequent documentation/crafting of note.       History of Present Illness    Once again I would like to thank you again for asking me to participate in the care of your patient, Benjamin Hooper.  As you know, but to reiterate for my own records, Benjamin Hooper is a 66 year old male with newly diagnosed atrial fibrillation. Specifically, Lamine had presented 24 January 2022 with subjective palpitations.  He was found to have evidence of atrial fibrillation with rapid ventricular response.  He underwent direct-current cardioversion.      On interview, Lamine states that he was keenly aware of the rhythm disturbance. Specifically, he could felt his heart racing and that it was a regular. He states that it came on \"like a switch\". He minimized any prominent shortness of breath or chest discomfort symptoms. He had perhaps some slight lightheadedness, but not a pronounced feature of his symptom profile. Admitted feeling somewhat anxious with it. He states that he has never had similar palpitations before. In the distant past he had had some subjective \"skipped beats\", but nothing sustained like the current presentation. He does exercise on a relatively frequent basis and he has had no chest discomfort, shortness of breath, or subjective decline with these activities. He denies any fevers, chills, or other constitutional symptoms.    Further review of systems is otherwise negative/noncontributory (medical record and 13 point review of systems reviewed as well and pertinent positives noted).       Physical Examination       /78 (BP Location: Left arm, Patient Position: Sitting, Cuff Size: Adult Regular)   Pulse 65   Temp 98.4  F (36.9  C) (Oral)   Resp 12   Ht 1.829 m (6')   Wt 95.8 kg (211 lb 4.8 oz)   SpO2 98%   BMI 28.66 kg/m          Wt Readings from Last 3 Encounters:   02/03/22 95.8 kg (211 lb 4.8 oz)   01/25/22 97.7 kg (215 lb 6.4 oz)   09/03/21 95 kg (209 lb 8 oz)     The patient is " alert and oriented times three. Sclerae are anicteric. Mucosal membranes are moist. Jugular venous pressure is normal. No significant adenopathy/thyromegally appreciated. Lungs are clear with good expansion. On cardiovascular exam, the patient has a regular S1 and S2. Abdomen is soft and non-tender. Extremities reveal no clubbing, cyanosis, or edema.       Family History/Social History/Risk Factors   Patient does not smoke.  Family history reviewed, and family history includes Breast Cancer in his sister; Cancer in his father and mother; Hypertension in his father and mother; Osteopenia in his sister; Osteoporosis in his mother, sister, and sister; Other - See Comments in his brother; Other Cancer in his father and mother; Pulmonary Embolism in his sister.        Medical History  Surgical History Family History Social History   Past Medical History:   Diagnosis Date     Arthritis 1/1/2015    Base of thumb     Cancer (H) 1/1/2001    AFX skin tumor on chin     Cyclotropia 7/5/2018     Diplopia      H/O magnetic resonance imaging     MRI 10/02/17 with and without contrast (outside imaging) without abnormality     HLD (hyperlipidemia)      Hypertension      Nonsenile cataract      Paroxysmal atrial fibrillation (H) 1/25/2022     Plantar fasciitis 8/28/2020     Trauma     Trauma as child 1-2 years of age, dropped onto the ground. Unknown how fell but developed a black tooth after being dropped.     Past Surgical History:   Procedure Laterality Date     EYE SURGERY      due to double vision 2018     HERNIA REPAIR      2010     NO HISTORY OF SURGERY       Family History   Problem Relation Age of Onset     Hypertension Mother      Cancer Mother         lung      Other Cancer Mother         Lung cancer     Osteoporosis Mother      Hypertension Father      Cancer Father         lung      Other Cancer Father         Lung cancer     Pulmonary Embolism Sister      Breast Cancer Sister      Osteoporosis Sister      Other - See  Comments Brother         prostate issue-pt had TURP      Osteopenia Sister      Osteoporosis Sister      Amblyopia No family hx of      Strabismus No family hx of      Colon Cancer No family hx of      Prostate Cancer No family hx of         Social History     Socioeconomic History     Marital status:      Spouse name: Not on file     Number of children: Not on file     Years of education: Not on file     Highest education level: Not on file   Occupational History     Not on file   Tobacco Use     Smoking status: Never Smoker     Smokeless tobacco: Never Used   Substance and Sexual Activity     Alcohol use: Yes     Drug use: Never     Sexual activity: Not Currently     Birth control/protection: None   Other Topics Concern     Parent/sibling w/ CABG, MI or angioplasty before 65F 55M? No   Social History Narrative     Not on file     Social Determinants of Health     Financial Resource Strain: Not on file   Food Insecurity: Not on file   Transportation Needs: Not on file   Physical Activity: Not on file   Stress: Not on file   Social Connections: Not on file   Intimate Partner Violence: Not on file   Housing Stability: Not on file           Medications  Allergies   Current Outpatient Medications   Medication Sig Dispense Refill     Ascorbic Acid (VITAMIN C) 500 MG CHEW Take one tab daily       atorvastatin (LIPITOR) 20 MG tablet        azelaic acid (FINACIA) 15 % external gel        diclofenac (VOLTAREN) 1 % topical gel prn       hydrOXYzine (ATARAX) 10 MG tablet Take 10 mg by mouth daily as needed for itching Prn only       losartan (COZAAR) 50 MG tablet        multivitamin (CENTRUM SILVER) tablet        tamsulosin (FLOMAX) 0.4 MG capsule        UNABLE TO FIND Apply 1 each to eye Administer 1 each into both eyes as needed. Med Name: Thera Tears       XARELTO ANTICOAGULANT 20 MG TABS tablet Take 1 tablet (20 mg) by mouth daily (with dinner) 90 tablet 3       Allergies   Allergen Reactions     Mupirocin Hives      Hives, rash and itching when mupirocin used in nares while taking oral doxycycline (no previous reaction when using mupirocin alone or doxycycline alone)     Cats Difficulty breathing and Itching     Grass Extracts [Gramineae Pollens] Itching     cough          Lab Results    Chemistry/lipid CBC Cardiac Enzymes/BNP/TSH/INR   Recent Labs   Lab Test 02/01/18  1343   CHOL 173   HDL 50      TRIG 91     Recent Labs   Lab Test 02/01/18  1343        Recent Labs   Lab Test 12/17/17  1745      POTASSIUM 3.8   CHLORIDE 104   CO2 24   GLC 86   BUN 14   CR 0.87   GFRESTIMATED 88   NORAH 9.6     Recent Labs   Lab Test 12/17/17  1745   CR 0.87     No results for input(s): A1C in the last 51104 hours.       Recent Labs   Lab Test 12/17/17  1745   WBC 9.3   HGB 16.9   HCT 49.2   MCV 87        Recent Labs   Lab Test 12/17/17  1745   HGB 16.9    No results for input(s): TROPONINI in the last 75976 hours.  No results for input(s): BNP, NTBNPI, NTBNP in the last 54088 hours.  Recent Labs   Lab Test 09/27/17  0000   TSH 1.34     No results for input(s): INR in the last 38446 hours.       Medications  Allergies   Current Outpatient Medications   Medication Sig Dispense Refill     Ascorbic Acid (VITAMIN C) 500 MG CHEW Take one tab daily       atorvastatin (LIPITOR) 20 MG tablet        azelaic acid (FINACIA) 15 % external gel        diclofenac (VOLTAREN) 1 % topical gel prn       hydrOXYzine (ATARAX) 10 MG tablet Take 10 mg by mouth daily as needed for itching Prn only       losartan (COZAAR) 50 MG tablet        multivitamin (CENTRUM SILVER) tablet        tamsulosin (FLOMAX) 0.4 MG capsule        UNABLE TO FIND Apply 1 each to eye Administer 1 each into both eyes as needed. Med Name: Thera Tears       XARELTO ANTICOAGULANT 20 MG TABS tablet Take 1 tablet (20 mg) by mouth daily (with dinner) 90 tablet 3      Allergies   Allergen Reactions     Mupirocin Hives     Hives, rash and itching when mupirocin used in nares  while taking oral doxycycline (no previous reaction when using mupirocin alone or doxycycline alone)     Cats Difficulty breathing and Itching     Grass Extracts [Gramineae Pollens] Itching     cough        Lab Results   Lab Results   Component Value Date     12/17/2017    CO2 24 12/17/2017    BUN 14 12/17/2017     Lab Results   Component Value Date    WBC 9.3 12/17/2017    HGB 16.9 12/17/2017    HCT 49.2 12/17/2017    MCV 87 12/17/2017     12/17/2017     Lab Results   Component Value Date    CHOL 173 02/01/2018    TRIG 91 02/01/2018    HDL 50 02/01/2018     No results found for: INR  No results found for: BNP  No results found for: CKTOTAL, CKMB, TROPONINI  Lab Results   Component Value Date    TSH 1.34 09/27/2017         Medical History  Surgical History   Past Medical History:   Diagnosis Date     Arthritis 1/1/2015    Base of thumb     Cancer (H) 1/1/2001    AFX skin tumor on chin     Cyclotropia 7/5/2018     Diplopia      H/O magnetic resonance imaging     MRI 10/02/17 with and without contrast (outside imaging) without abnormality     HLD (hyperlipidemia)      Hypertension      Nonsenile cataract      Paroxysmal atrial fibrillation (H) 1/25/2022     Plantar fasciitis 8/28/2020     Trauma     Trauma as child 1-2 years of age, dropped onto the ground. Unknown how fell but developed a black tooth after being dropped.      Past Surgical History:   Procedure Laterality Date     EYE SURGERY      due to double vision 2018     HERNIA REPAIR      2010     NO HISTORY OF SURGERY                             Thank you for allowing me to participate in the care of your patient.      Sincerely,     José Lundy MD     [facility]  cc:   Venu Dugan MD  [address] [place] [address]

## 2022-02-03 NOTE — PROGRESS NOTES
[facility] [address], [place] [address]   [email or web address]   OFFICE: [phone]          Thank you Dr. Dugan for asking the [facility] team to participate in the care of your patient, Benjamin Hooper.     Impression and Plan        1.  Atrial fibrillation (paroxysmal).  As noted below, Lamine has newly diagnosed paroxysmal atrial fibrillation. Specifically, Lamine had presented 24 January 2022 with subjective palpitations. He underwent direct-current cardioversion.  Lamine's [id number] Score is 2 yielding an annual embolic risk of 2.2-2.9%. He was started on rivaroxaban for CVA prophylaxis.  As noted below, Lamine was keenly aware of the rhythm disturbance. He reports no other subjective episodes. He has not had any recurrence. Parenthetically, he has been evaluated for sleep apnea in the past though overnight oximetry in the like did not reveal prominent sleep apnea. Laboratory studies were reviewed and no abnormalities of note. Plan:     Would advocate continuing rivaroxaban for now in the event of recurrence.     Echocardiogram (patient states that he is scheduled to have this performed already at the [facility] though may decide to have it done locally).     Further recommendations pending echocardiographic findings.     2.  Hypertension.  Blood pressure is fairly reasonable in the office today at 103/78 mmHg.     Continue losartan.      3.  Dyslipidemia.      Continue statin therapy.     Tentatively will arrange for follow-up in approximately 2-3 months.    30 minutes spent reviewing prior records (including documentation, laboratory studies, cardiac testing/imaging), interview with patient along with physical exam, planning, and subsequent documentation/crafting of note.       History of Present Illness    Once again I would like to thank you again for asking me to participate in the care of your patient, Benjamin Hooper.  As  "you know, but to reiterate for my own records, Benjamin Hooper is a 66 year old male with newly diagnosed atrial fibrillation. Specifically, Lamine had presented 24 January 2022 with subjective palpitations.  He was found to have evidence of atrial fibrillation with rapid ventricular response.  He underwent direct-current cardioversion.      On interview, Lamine states that he was keenly aware of the rhythm disturbance. Specifically, he could felt his heart racing and that it was a regular. He states that it came on \"like a switch\". He minimized any prominent shortness of breath or chest discomfort symptoms. He had perhaps some slight lightheadedness, but not a pronounced feature of his symptom profile. Admitted feeling somewhat anxious with it. He states that he has never had similar palpitations before. In the distant past he had had some subjective \"skipped beats\", but nothing sustained like the current presentation. He does exercise on a relatively frequent basis and he has had no chest discomfort, shortness of breath, or subjective decline with these activities. He denies any fevers, chills, or other constitutional symptoms.    Further review of systems is otherwise negative/noncontributory (medical record and 13 point review of systems reviewed as well and pertinent positives noted).       Physical Examination       /78 (BP Location: Left arm, Patient Position: Sitting, Cuff Size: Adult Regular)   Pulse 65   Temp 98.4  F (36.9  C) (Oral)   Resp 12   Ht 1.829 m (6')   Wt 95.8 kg (211 lb 4.8 oz)   SpO2 98%   BMI 28.66 kg/m          Wt Readings from Last 3 Encounters:   02/03/22 95.8 kg (211 lb 4.8 oz)   01/25/22 97.7 kg (215 lb 6.4 oz)   09/03/21 95 kg (209 lb 8 oz)     The patient is alert and oriented times three. Sclerae are anicteric. Mucosal membranes are moist. Jugular venous pressure is normal. No significant adenopathy/thyromegally appreciated. Lungs are clear with good expansion. On " cardiovascular exam, the patient has a regular S1 and S2. Abdomen is soft and non-tender. Extremities reveal no clubbing, cyanosis, or edema.       Family History/Social History/Risk Factors   Patient does not smoke.  Family history reviewed, and family history includes Breast Cancer in his sister; Cancer in his father and mother; Hypertension in his father and mother; Osteopenia in his sister; Osteoporosis in his mother, sister, and sister; Other - See Comments in his brother; Other Cancer in his father and mother; Pulmonary Embolism in his sister.        Medical History  Surgical History Family History Social History   Past Medical History:   Diagnosis Date     Arthritis 1/1/2015    Base of thumb     Cancer (H) 1/1/2001    AFX skin tumor on chin     Cyclotropia 7/5/2018     Diplopia      H/O magnetic resonance imaging     MRI 10/02/17 with and without contrast (outside imaging) without abnormality     HLD (hyperlipidemia)      Hypertension      Nonsenile cataract      Paroxysmal atrial fibrillation (H) 1/25/2022     Plantar fasciitis 8/28/2020     Trauma     Trauma as child 1-2 years of age, dropped onto the ground. Unknown how fell but developed a black tooth after being dropped.     Past Surgical History:   Procedure Laterality Date     EYE SURGERY      due to double vision 2018     HERNIA REPAIR      2010     NO HISTORY OF SURGERY       Family History   Problem Relation Age of Onset     Hypertension Mother      Cancer Mother         lung      Other Cancer Mother         Lung cancer     Osteoporosis Mother      Hypertension Father      Cancer Father         lung      Other Cancer Father         Lung cancer     Pulmonary Embolism Sister      Breast Cancer Sister      Osteoporosis Sister      Other - See Comments Brother         prostate issue-pt had TURP      Osteopenia Sister      Osteoporosis Sister      Amblyopia No family hx of      Strabismus No family hx of      Colon Cancer No family hx of      Prostate  Cancer No family hx of         Social History     Socioeconomic History     Marital status:      Spouse name: Not on file     Number of children: Not on file     Years of education: Not on file     Highest education level: Not on file   Occupational History     Not on file   Tobacco Use     Smoking status: Never Smoker     Smokeless tobacco: Never Used   Substance and Sexual Activity     Alcohol use: Yes     Drug use: Never     Sexual activity: Not Currently     Birth control/protection: None   Other Topics Concern     Parent/sibling w/ CABG, MI or angioplasty before 65F 55M? No   Social History Narrative     Not on file     Social Determinants of Health     Financial Resource Strain: Not on file   Food Insecurity: Not on file   Transportation Needs: Not on file   Physical Activity: Not on file   Stress: Not on file   Social Connections: Not on file   Intimate Partner Violence: Not on file   Housing Stability: Not on file           Medications  Allergies   Current Outpatient Medications   Medication Sig Dispense Refill     Ascorbic Acid (VITAMIN C) 500 MG CHEW Take one tab daily       atorvastatin (LIPITOR) 20 MG tablet        azelaic acid (FINACIA) 15 % external gel        diclofenac (VOLTAREN) 1 % topical gel prn       hydrOXYzine (ATARAX) 10 MG tablet Take 10 mg by mouth daily as needed for itching Prn only       losartan (COZAAR) 50 MG tablet        multivitamin (CENTRUM SILVER) tablet        tamsulosin (FLOMAX) 0.4 MG capsule        UNABLE TO FIND Apply 1 each to eye Administer 1 each into both eyes as needed. Med Name: Thera Tears       XARELTO ANTICOAGULANT 20 MG TABS tablet Take 1 tablet (20 mg) by mouth daily (with dinner) 90 tablet 3       Allergies   Allergen Reactions     Mupirocin Hives     Hives, rash and itching when mupirocin used in nares while taking oral doxycycline (no previous reaction when using mupirocin alone or doxycycline alone)     Cats Difficulty breathing and Itching     Grass  Extracts [Gramineae Pollens] Itching     cough          Lab Results    Chemistry/lipid CBC Cardiac Enzymes/BNP/TSH/INR   Recent Labs   Lab Test 02/01/18  1343   CHOL 173   HDL 50      TRIG 91     Recent Labs   Lab Test 02/01/18  1343        Recent Labs   Lab Test 12/17/17  1745      POTASSIUM 3.8   CHLORIDE 104   CO2 24   GLC 86   BUN 14   CR 0.87   GFRESTIMATED 88   NORAH 9.6     Recent Labs   Lab Test 12/17/17  1745   CR 0.87     No results for input(s): A1C in the last 22989 hours.       Recent Labs   Lab Test 12/17/17  1745   WBC 9.3   HGB 16.9   HCT 49.2   MCV 87        Recent Labs   Lab Test 12/17/17  1745   HGB 16.9    No results for input(s): TROPONINI in the last 75955 hours.  No results for input(s): BNP, NTBNPI, NTBNP in the last 83393 hours.  Recent Labs   Lab Test 09/27/17  0000   TSH 1.34     No results for input(s): INR in the last 99554 hours.       Medications  Allergies   Current Outpatient Medications   Medication Sig Dispense Refill     Ascorbic Acid (VITAMIN C) 500 MG CHEW Take one tab daily       atorvastatin (LIPITOR) 20 MG tablet        azelaic acid (FINACIA) 15 % external gel        diclofenac (VOLTAREN) 1 % topical gel prn       hydrOXYzine (ATARAX) 10 MG tablet Take 10 mg by mouth daily as needed for itching Prn only       losartan (COZAAR) 50 MG tablet        multivitamin (CENTRUM SILVER) tablet        tamsulosin (FLOMAX) 0.4 MG capsule        UNABLE TO FIND Apply 1 each to eye Administer 1 each into both eyes as needed. Med Name: Thera Tears       XARELTO ANTICOAGULANT 20 MG TABS tablet Take 1 tablet (20 mg) by mouth daily (with dinner) 90 tablet 3      Allergies   Allergen Reactions     Mupirocin Hives     Hives, rash and itching when mupirocin used in nares while taking oral doxycycline (no previous reaction when using mupirocin alone or doxycycline alone)     Cats Difficulty breathing and Itching     Grass Extracts [Gramineae Pollens] Itching     cough         Lab Results   Lab Results   Component Value Date     12/17/2017    CO2 24 12/17/2017    BUN 14 12/17/2017     Lab Results   Component Value Date    WBC 9.3 12/17/2017    HGB 16.9 12/17/2017    HCT 49.2 12/17/2017    MCV 87 12/17/2017     12/17/2017     Lab Results   Component Value Date    CHOL 173 02/01/2018    TRIG 91 02/01/2018    HDL 50 02/01/2018     No results found for: INR  No results found for: BNP  No results found for: CKTOTAL, CKMB, TROPONINI  Lab Results   Component Value Date    TSH 1.34 09/27/2017         Medical History  Surgical History   Past Medical History:   Diagnosis Date     Arthritis 1/1/2015    Base of thumb     Cancer (H) 1/1/2001    AFX skin tumor on chin     Cyclotropia 7/5/2018     Diplopia      H/O magnetic resonance imaging     MRI 10/02/17 with and without contrast (outside imaging) without abnormality     HLD (hyperlipidemia)      Hypertension      Nonsenile cataract      Paroxysmal atrial fibrillation (H) 1/25/2022     Plantar fasciitis 8/28/2020     Trauma     Trauma as child 1-2 years of age, dropped onto the ground. Unknown how fell but developed a black tooth after being dropped.      Past Surgical History:   Procedure Laterality Date     EYE SURGERY      due to double vision 2018     HERNIA REPAIR      2010     NO HISTORY OF SURGERY

## 2022-03-05 PROCEDURE — 99292 CRITICAL CARE ADDL 30 MIN: CPT | Mod: 25

## 2022-03-05 PROCEDURE — 92960 CARDIOVERSION ELECTRIC EXT: CPT

## 2022-03-05 PROCEDURE — 99152 MOD SED SAME PHYS/QHP 5/>YRS: CPT

## 2022-03-05 PROCEDURE — 99291 CRITICAL CARE FIRST HOUR: CPT | Mod: 25

## 2022-03-06 ENCOUNTER — HOSPITAL ENCOUNTER (EMERGENCY)
Facility: CLINIC | Age: 67
Discharge: HOME | End: 2022-03-06
Attending: EMERGENCY MEDICINE | Admitting: EMERGENCY MEDICINE
Payer: MEDICARE

## 2022-03-06 VITALS
DIASTOLIC BLOOD PRESSURE: 79 MMHG | RESPIRATION RATE: 14 BRPM | OXYGEN SATURATION: 97 % | HEART RATE: 68 BPM | BODY MASS INDEX: 28.04 KG/M2 | TEMPERATURE: 98.5 F | HEIGHT: 72 IN | SYSTOLIC BLOOD PRESSURE: 114 MMHG | WEIGHT: 207 LBS

## 2022-03-06 DIAGNOSIS — I48.0 PAROXYSMAL ATRIAL FIBRILLATION WITH RVR (H): ICD-10-CM

## 2022-03-06 LAB
ANION GAP SERPL CALCULATED.3IONS-SCNC: 9 MMOL/L (ref 5–18)
ATRIAL RATE - MUSE: 133 BPM
ATRIAL RATE - MUSE: 91 BPM
BASOPHILS # BLD AUTO: 0 10E3/UL (ref 0–0.2)
BASOPHILS NFR BLD AUTO: 0 %
BUN SERPL-MCNC: 14 MG/DL (ref 8–22)
CALCIUM SERPL-MCNC: 9.6 MG/DL (ref 8.5–10.5)
CHLORIDE BLD-SCNC: 106 MMOL/L (ref 98–107)
CO2 SERPL-SCNC: 22 MMOL/L (ref 22–31)
CREAT SERPL-MCNC: 0.79 MG/DL (ref 0.7–1.3)
DIASTOLIC BLOOD PRESSURE - MUSE: NORMAL MMHG
DIASTOLIC BLOOD PRESSURE - MUSE: NORMAL MMHG
EOSINOPHIL # BLD AUTO: 0.3 10E3/UL (ref 0–0.7)
EOSINOPHIL NFR BLD AUTO: 5 %
ERYTHROCYTE [DISTWIDTH] IN BLOOD BY AUTOMATED COUNT: 11.7 % (ref 10–15)
GFR SERPL CREATININE-BSD FRML MDRD: >90 ML/MIN/1.73M2
GLUCOSE BLD-MCNC: 149 MG/DL (ref 70–125)
HCT VFR BLD AUTO: 50.3 % (ref 40–53)
HGB BLD-MCNC: 17.2 G/DL (ref 13.3–17.7)
HOLD SPECIMEN: NORMAL
HOLD SPECIMEN: NORMAL
IMM GRANULOCYTES # BLD: 0 10E3/UL
IMM GRANULOCYTES NFR BLD: 0 %
INR PPP: 1.88 (ref 0.85–1.15)
INTERPRETATION ECG - MUSE: NORMAL
INTERPRETATION ECG - MUSE: NORMAL
LYMPHOCYTES # BLD AUTO: 2.3 10E3/UL (ref 0.8–5.3)
LYMPHOCYTES NFR BLD AUTO: 34 %
MAGNESIUM SERPL-MCNC: 2 MG/DL (ref 1.8–2.6)
MCH RBC QN AUTO: 29.5 PG (ref 26.5–33)
MCHC RBC AUTO-ENTMCNC: 34.2 G/DL (ref 31.5–36.5)
MCV RBC AUTO: 86 FL (ref 78–100)
MONOCYTES # BLD AUTO: 0.7 10E3/UL (ref 0–1.3)
MONOCYTES NFR BLD AUTO: 11 %
NEUTROPHILS # BLD AUTO: 3.3 10E3/UL (ref 1.6–8.3)
NEUTROPHILS NFR BLD AUTO: 50 %
NRBC # BLD AUTO: 0 10E3/UL
NRBC BLD AUTO-RTO: 0 /100
P AXIS - MUSE: 45 DEGREES
P AXIS - MUSE: NORMAL DEGREES
PLATELET # BLD AUTO: 194 10E3/UL (ref 150–450)
POTASSIUM BLD-SCNC: 4 MMOL/L (ref 3.5–5)
PR INTERVAL - MUSE: 208 MS
PR INTERVAL - MUSE: NORMAL MS
QRS DURATION - MUSE: 82 MS
QRS DURATION - MUSE: 84 MS
QT - MUSE: 274 MS
QT - MUSE: 362 MS
QTC - MUSE: 422 MS
QTC - MUSE: 445 MS
R AXIS - MUSE: 27 DEGREES
R AXIS - MUSE: 49 DEGREES
RBC # BLD AUTO: 5.83 10E6/UL (ref 4.4–5.9)
SODIUM SERPL-SCNC: 137 MMOL/L (ref 136–145)
SYSTOLIC BLOOD PRESSURE - MUSE: NORMAL MMHG
SYSTOLIC BLOOD PRESSURE - MUSE: NORMAL MMHG
T AXIS - MUSE: -54 DEGREES
T AXIS - MUSE: 36 DEGREES
TROPONIN I SERPL-MCNC: <0.01 NG/ML (ref 0–0.29)
VENTRICULAR RATE- MUSE: 143 BPM
VENTRICULAR RATE- MUSE: 91 BPM
WBC # BLD AUTO: 6.7 10E3/UL (ref 4–11)

## 2022-03-06 PROCEDURE — 83735 ASSAY OF MAGNESIUM: CPT | Performed by: EMERGENCY MEDICINE

## 2022-03-06 PROCEDURE — 93005 ELECTROCARDIOGRAM TRACING: CPT | Performed by: EMERGENCY MEDICINE

## 2022-03-06 PROCEDURE — 250N000011 HC RX IP 250 OP 636: Performed by: EMERGENCY MEDICINE

## 2022-03-06 PROCEDURE — 85610 PROTHROMBIN TIME: CPT | Performed by: EMERGENCY MEDICINE

## 2022-03-06 PROCEDURE — 999N000014 HC STATISTIC APNEA MONITORING

## 2022-03-06 PROCEDURE — 84484 ASSAY OF TROPONIN QUANT: CPT | Performed by: EMERGENCY MEDICINE

## 2022-03-06 PROCEDURE — 80048 BASIC METABOLIC PNL TOTAL CA: CPT | Performed by: EMERGENCY MEDICINE

## 2022-03-06 PROCEDURE — 36415 COLL VENOUS BLD VENIPUNCTURE: CPT | Performed by: EMERGENCY MEDICINE

## 2022-03-06 PROCEDURE — 999N000158 HC STATISTIC RCP TIME ED VENT EA 10 MIN

## 2022-03-06 PROCEDURE — 85025 COMPLETE CBC W/AUTO DIFF WBC: CPT | Performed by: EMERGENCY MEDICINE

## 2022-03-06 RX ORDER — METOPROLOL TARTRATE 50 MG
50 TABLET ORAL 2 TIMES DAILY PRN
Qty: 10 TABLET | Refills: 0 | Status: SHIPPED | OUTPATIENT
Start: 2022-03-06 | End: 2022-04-01

## 2022-03-06 RX ORDER — PROPOFOL 10 MG/ML
100 INJECTION, EMULSION INTRAVENOUS ONCE
Status: COMPLETED | OUTPATIENT
Start: 2022-03-06 | End: 2022-03-06

## 2022-03-06 RX ADMIN — PROPOFOL 100 MG: 10 INJECTION, EMULSION INTRAVENOUS at 00:36

## 2022-03-06 NOTE — DISCHARGE INSTRUCTIONS
Call your cardiologist on Monday.  Advise them of your recurrent atrial fib requiring cardioversion.  Also discuss the ischemia noted on your EKG with your rapid heart rate, and possible need for stress testing.  You should also advise your cardiologist that we are doing a beta-blocker as needed for a rapid heart rate to see if they would like to manage your rapid atrial fib in a different way.  Continue your medications as previously prescribed  Return to the emergency department for chest pain, shortness of breath, or if you are unable to control your rate at home with the metoprolol I have prescribed.  If you are able to rate control your atrial fib, then you can be seen in clinic for cardioversion.  If you are not able to rate control at home then return to the emergency department for further care and evaluation.

## 2022-03-06 NOTE — ED TRIAGE NOTES
Pt comes in with complaints of irregular beats while watching tv. Pt has hx of new afib with RVR in January with cardioversion, started on xarelto.    Pt took HR at home 150s.     Denies chest pain or sob.
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
## 2022-03-06 NOTE — PROGRESS NOTES
RT called for cardioversion. ETCO,pulse ox, sx and Ambu ready. Pt started on RA sats 98%. Post shock pt went apneic and sats dropped to 79% pt head repositioned , jaw thrust  and ambu used to give x 3 breathes . Sat increased to 95% and pt started to breathe and arouse . RT by pt side until he was answering questions and opening his eyes.

## 2022-03-10 ENCOUNTER — OFFICE VISIT (OUTPATIENT)
Dept: CARDIOLOGY | Facility: CLINIC | Age: 67
End: 2022-03-10
Payer: MEDICARE

## 2022-03-10 VITALS
HEIGHT: 72 IN | BODY MASS INDEX: 28.15 KG/M2 | WEIGHT: 207.8 LBS | DIASTOLIC BLOOD PRESSURE: 76 MMHG | RESPIRATION RATE: 12 BRPM | SYSTOLIC BLOOD PRESSURE: 122 MMHG | HEART RATE: 72 BPM

## 2022-03-10 DIAGNOSIS — E78.5 HYPERLIPIDEMIA, UNSPECIFIED HYPERLIPIDEMIA TYPE: ICD-10-CM

## 2022-03-10 DIAGNOSIS — R94.31 ST SEGMENT DEPRESSION: ICD-10-CM

## 2022-03-10 DIAGNOSIS — I10 ESSENTIAL HYPERTENSION: ICD-10-CM

## 2022-03-10 DIAGNOSIS — I48.0 PAROXYSMAL ATRIAL FIBRILLATION WITH RVR (H): ICD-10-CM

## 2022-03-10 PROCEDURE — 99214 OFFICE O/P EST MOD 30 MIN: CPT | Performed by: GENERAL ACUTE CARE HOSPITAL

## 2022-03-10 NOTE — PATIENT INSTRUCTIONS
"1. We will schedule you for a stress test.  2. If you stress test looks normal, we can use a medicine called \"flecainide\".  3. I would also like you to see our Atrial Fibrillation clinic.    Patient Education     Understanding Atrial Fibrillation     An arrhythmia is any problem with the speed or pattern of the heartbeat. Atrial fibrillation (AFib) is the most common type of arrhythmia. It causes fast, chaotic electrical signals in the atria. This makes it hard for the heart to work as it should. It also affects how much blood your heart can pump out to the body.  AFib may occur once in a while and go away on its own. Or it may continue for longer periods and need treatment.  AFib can lead to serious problems, such as stroke. Your healthcare provider will need to monitor and manage it.    What happens during atrial fibrillation?   The heart has an electrical system that sends signals to control the heartbeat. As signals move through the heart, they tell the heart s upper chambers (atria) and lower chambers (ventricles) when to squeeze (contract) and relax. This lets blood move through the heart and out to the body and lungs.  With AFib, the atria receive abnormal signals. This causes them to contract in a fast and irregular way, and out of sync with the ventricles. When this happens, the atria also have a harder time moving blood into the ventricles. Blood may then pool in the atria. This increases the risk for blood clots and stroke. The ventricles also may contract too quickly and irregularly. As a result, they may not pump blood to the body and lungs as well as they should. This can weaken the heart muscle over time and cause heart failure.  What causes atrial fibrillation?  AFib is more common in older adults. It has many possible causes:    Coronary artery disease    Heart valve disease    Heart attack    Heart surgery    High blood pressure    Thyroid disease    Diabetes    Lung disease    Sleep apnea    Heavy " alcohol use  In some cases of AFib, doctors don't know the cause.  What are the symptoms of atrial fibrillation?  AFib may not cause symptoms. If symptoms do occur, they may include:    A fast, pounding, irregular heartbeat    Shortness of breath    Tiredness    Dizziness or fainting    Chest pain  How is atrial fibrillation treated?  Treatments for AFib can include any of the options below.    Medicines. You may be prescribed:  ? Heart rate medicines to help slow down the heartbeat  ? Heart rhythm medicines to help the heart beat more regularly  ? Blood thinners or anti-clotting medicines to help reduce the risk for blood clots and stroke.    Left atrial appendage closure. Your healthcare provider may advise this device to prevent stroke. You may need if you are at high risk for stroke but have problems taking blood-thinner (anticoagulant) medicines. The device is placed in the part of the heart where most clots form. This area is called the left atrial appendage (SIMI). It's a pouch-like structure in the muscle wall of the left atrium. The device closes off the SIMI to prevent clots moving from the heart to the brain and causing a stroke.    Electrical cardioversion. Your healthcare provider uses special pads or paddles to send one or more brief electrical shocks to the heart. This can help reset the heartbeat to normal.    Ablation. Long, thin tubes (catheters) are threaded through a blood vessel to the heart. There, the catheters send out hot or cold energy to the areas causing the abnormal signals. This energy destroys the problem tissue or cells. This improves the chances that your heart will stay in normal rhythm without using medicines. If your heart rate and rhythm can t be controlled, you may need ablation and a pacemaker. These will help control the heart rate and regularity of the heartbeat.    Surgery. During surgery, your healthcare provider may use different methods to create scar tissue in the areas of  the heart causing the abnormal signals. The scar tissue disrupts the abnormal signals and may stop AFib from occurring.    Hybrid surgical-catheter ablation for AFib. This treatment is used for people with AFib that continues or is hard to treat.. It combines surgery with a catheter ablation. During the surgery, the surgeon makes small cuts (incisions) between the ribs in the chest or in the abdomen near the sternum. The surgeon puts a scope through the incisions to get to the backside of the heart. The catheter portion of the procedure is done by putting a catheter into a vein in the groin. The catheter is guided to the inside of the heart. Using the catheter, radiofrequency ablation is done to destroy the tissue inside the heart that is causing the AFib. Using both of these approaches may work better to block the abnormal electrical signals and be a more permanent treatment for persistent AFib.  What are possible complications of atrial fibrillation?  Complications can include:    Blood clots    Stroke    Heart failure. This problem occurs when the heart muscle weakens so much that it can no longer pump blood well.  When should I call my healthcare provider?  Call your healthcare provider right away if you have any of these:    Symptoms that don t get better with treatment, or get worse    New symptoms  Jose Luis last reviewed this educational content on 4/1/2019 2000-2021 [employer]. All rights reserved. This information is not intended as a substitute for professional medical care. Always follow your healthcare professional's instructions.

## 2022-03-10 NOTE — LETTER
3/10/2022    Venu Dugan MD  [address] [facility] [address]    RE: Benjamin Hooper       Dear Colleague,     I had the pleasure of seeing Benjamin Hooper in the [facility].  HEART CARE ENCOUNTER NOTE        Assessment/Recommendations   Assessment:    1. Paroxysmal atrial fibrillation status post direct current cardioversion on 1/24/2022 and 3/6/2022. In sinus rhythm today. [id number] score is at least 2 (hypertension, age 65-74).  2. ST segment depression on electrocardiogram while in rapid ventricular response. This could indicate underlying coronary artery disease although he has not been having anginal symptoms.  3. Essential hypertension. Controlled.  4. Hyperlipidemia. Last .  5. BMI 28.18.    Plan:  1. Exercise treadmill nuclear stress test.  2. If his stress test shows no ischemia, we can start flecainide perhaps initially as a pill-in-the-pocket strategy along with his metoprolol, but he could take this daily if he has a lot of recurrent events.  3. Continue rivaroxaban 20 mg daily.  4. Follow-up in Atrial Fibrillation clinic.         History of Present Illness   Mr. Benjamin Hooper is a 66 year old male with a significant past history of recently-diagnosed paroxysmal AF s/p DCCV on 1/24/2022 and again on 3/6/2022, HTN, and HLD presenting for urgent follow-up of recurrent AF. He was seen in the ED for this on 3/5/2022. He has previously been seen in cardiology clinic by my colleague, Dr. Lundy.    He had recurrent AF this weekend, feeling severe palpitations and dizziness. HR was 150-160. ECG in the ED noted AF with ventricular rate 143 and mild inferolateral ST depressions. After DCCV, ECG showed resolution of ST depressions. A single troponin was checked, which was undetectable. He was prescribed metoprolol to use as needed, but he has not yet used this. He takes rivaroxaban daily. He did have a lot of bruising after his last blood draw but otherwise  has been fine.    He has cut back on drinking alcohol and caffeine. He snores occasional but did have a nocturnal oximetry test last in 2019 which was normal. He has never had a formal sleep study. He exercises on the treadmill getting up to 8 METs per the treadmill calculation. He denies any chest pain/pressure/tightness, shortness of breath at rest or with exertion, light headedness/dizziness, pre-syncope, syncope, lower extremity swelling, paroxysmal nocturnal dyspnea (PND), or orthopnea.     Cardiac Problems and Cardiac Diagnostics     Most Recent Cardiac testing:  ECG dated 3/5/2022 (personaly reviewed and interpreted): atrial fibrillation with rapid ventricular response of 143 bpm, subtle inferolateral ST depressions    ECHO 2/21/2022 (report reviewed):   1. Normal left ventricular chamber size by 2D linear dimension; mildly enlarged by volume.   2. Calculated 2-D biplane volumetric left ventricular ejection fraction 61%.   3. No regional wall motion abnormalities.   4. Borderline enlarged right ventricular chamber size, normal systolic function, estimated right ventricular systolic pressure 21 mmHg (systolic blood pressure 148 mmHg).   5. Normal left ventricular filling pressure.   6. No  significant valvular heart disease.   7. Borderline enlarged sinus of Valsalva diameter (diameter 41 mm); upper normal for patient is 43 mm.   8. Compared to the report of 05/15/2014 no significant change has occurred.       Medications  Allergies   Current Outpatient Medications   Medication Sig Dispense Refill     Ascorbic Acid (VITAMIN C) 500 MG CHEW Take one tab daily       atorvastatin (LIPITOR) 20 MG tablet Take 20 mg by mouth daily        azelaic acid (FINACIA) 15 % external gel Apply 1 inch topically daily as needed        diclofenac (VOLTAREN) 1 % topical gel Apply 2 g topically daily as needed prn       losartan (COZAAR) 50 MG tablet Take 50 mg by mouth daily        metoprolol tartrate (LOPRESSOR) 50 MG tablet Take  1 tablet (50 mg) by mouth 2 times daily as needed (atrial fibrillation with heart rate greater than 120) May repeat dose after one hour if rate not improved with first dose 10 tablet 0     tamsulosin (FLOMAX) 0.4 MG capsule Take 0.4 mg by mouth daily        UNABLE TO FIND Apply 1 each to eye Administer 1 each into both eyes as needed. Med Name: Thera Tears       XARELTO ANTICOAGULANT 20 MG TABS tablet Take 1 tablet (20 mg) by mouth daily (with dinner) 90 tablet 3     hydrOXYzine (ATARAX) 10 MG tablet Take 10 mg by mouth daily as needed for itching Prn only (Patient not taking: Reported on 3/10/2022)       multivitamin (CENTRUM SILVER) tablet  (Patient not taking: Reported on 3/10/2022)        Allergies   Allergen Reactions     Mupirocin Hives     Hives, rash and itching when mupirocin used in nares while taking oral doxycycline (no previous reaction when using mupirocin alone or doxycycline alone)     Cats Difficulty breathing and Itching     Grass Extracts [Gramineae Pollens] Itching     cough        Physical Examination Review of Systems   /76 (BP Location: Left arm, Patient Position: Sitting, Cuff Size: Adult Regular)   Pulse 72   Resp 12   Ht 1.829 m (6')   Wt 94.3 kg (207 lb 12.8 oz)   BMI 28.18 kg/m    Body mass index is 28.18 kg/m .  Wt Readings from Last 3 Encounters:   03/10/22 94.3 kg (207 lb 12.8 oz)   03/05/22 93.9 kg (207 lb)   02/03/22 95.8 kg (211 lb 4.8 oz)       General Appearance:   Pleasant  male, appears  stated age. no acute distress, normal body habitus   ENT/Mouth: Facemask.   EYES:  no scleral icterus, normal conjunctivae   Neck: no carotid bruits. No anterior cervical lymphadenopaty   Respiratory:   lungs are clear to auscultation, no rales or wheezing,  equal chest wall expansion    Cardiovascular:   Regular rhythm, normal rate. Normal first and second heart sounds with no murmurs, rubs, or gallops; the carotid, radial and posterior tibial pulses are intact, Jugular venous  pressure normal, no edema bilaterally    Abdomen/GI:  no organomegaly, masses, bruits, or tenderness; bowel sounds are present   Extremities: no cyanosis or clubbing   Skin: no xanthelasma, warm.    Heme/lymph/ Immunology No apparent bleeding noted.   Neurologic: Alert and oriented. normal gait, no tremors     Psychiatric: Pleasant, calm, appropriate affect.    A complete 10 system review of systems was performed and is negative except as mentioned in the HPI/subjective.         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 Unknown how fell but developed a black tooth after being dropped.       Past Surgical History:   Past Surgical History:   Procedure Laterality Date     EYE SURGERY      due to double vision 2018     HERNIA REPAIR      2010     NO HISTORY OF SURGERY         Family History:   Family History   Problem Relation Age of Onset     Hypertension Mother      Cancer Mother         lung      Other Cancer Mother         Lung cancer     Osteoporosis Mother      Hypertension Father      Cancer Father         lung      Other Cancer Father         Lung cancer     Pulmonary Embolism Sister      Breast Cancer Sister      Osteoporosis Sister      Other - See Comments Brother         prostate issue-pt had TURP      Osteopenia Sister      Osteoporosis Sister      Amblyopia No family hx of      Strabismus No family hx of      Colon Cancer No family hx of      Prostate Cancer No family hx of        Social History:   Social  History     Socioeconomic History     Marital status:      Spouse name: Not on file     Number of children: Not on file     Years of education: Not on file     Highest education level: Not on file   Occupational History     Not on file   Tobacco Use     Smoking status: Never Smoker     Smokeless tobacco: Never Used   Substance and Sexual Activity     Alcohol use: Yes     Drug use: Never     Sexual activity: Not Currently     Birth control/protection: None   Other Topics Concern     Parent/sibling w/ CABG, MI or angioplasty before 65F 55M? No   Social History Narrative     Not on file     Social Determinants of Health     Financial Resource Strain: Not on file   Food Insecurity: Not on file   Transportation Needs: Not on file   Physical Activity: Not on file   Stress: Not on file   Social Connections: Not on file   Intimate Partner Violence: Not on file   Housing Stability: Not on file              Lab Results    Chemistry/lipid CBC Cardiac Enzymes/BNP/TSH/INR   Lab Results   Component Value Date    CHOL 173 02/01/2018    HDL 50 02/01/2018     02/01/2018    TRIG 91 02/01/2018    CR 0.79 03/06/2022    BUN 14 03/06/2022    POTASSIUM 4.0 03/06/2022     03/06/2022    CO2 22 03/06/2022      Lab Results   Component Value Date    WBC 6.7 03/06/2022    HGB 17.2 03/06/2022    HCT 50.3 03/06/2022    MCV 86 03/06/2022     03/06/2022      Lab Results   Component Value Date    TROPONINI <0.01 03/06/2022    TSH 1.34 09/27/2017    INR 1.88 (H) 03/06/2022          Lei Jang MD [facility]  Non-Invasive Cardiologist  [facility]  Pager [phone]    Thank you for allowing me to participate in the care of your patient.      Sincerely,     Lei Jang MD     [facility]  cc:   Ofe Bowman MD  Formerly [facility] [place] [address]

## 2022-03-10 NOTE — PROGRESS NOTES
HEART CARE ENCOUNTER NOTE        Assessment/Recommendations   Assessment:    1. Paroxysmal atrial fibrillation status post direct current cardioversion on 1/24/2022 and 3/6/2022. In sinus rhythm today. [id number] score is at least 2 (hypertension, age 65-74).  2. ST segment depression on electrocardiogram while in rapid ventricular response. This could indicate underlying coronary artery disease although he has not been having anginal symptoms.  3. Essential hypertension. Controlled.  4. Hyperlipidemia. Last .  5. BMI 28.18.    Plan:  1. Exercise treadmill nuclear stress test.  2. If his stress test shows no ischemia, we can start flecainide perhaps initially as a pill-in-the-pocket strategy along with his metoprolol, but he could take this daily if he has a lot of recurrent events.  3. Continue rivaroxaban 20 mg daily.  4. Follow-up in Atrial Fibrillation clinic.         History of Present Illness   Mr. Benjamin Hooper is a 66 year old male with a significant past history of recently-diagnosed paroxysmal AF s/p DCCV on 1/24/2022 and again on 3/6/2022, HTN, and HLD presenting for urgent follow-up of recurrent AF. He was seen in the ED for this on 3/5/2022. He has previously been seen in cardiology clinic by my colleague, Dr. Lundy.    He had recurrent AF this weekend, feeling severe palpitations and dizziness. HR was 150-160. ECG in the ED noted AF with ventricular rate 143 and mild inferolateral ST depressions. After DCCV, ECG showed resolution of ST depressions. A single troponin was checked, which was undetectable. He was prescribed metoprolol to use as needed, but he has not yet used this. He takes rivaroxaban daily. He did have a lot of bruising after his last blood draw but otherwise has been fine.    He has cut back on drinking alcohol and caffeine. He snores occasional but did have a nocturnal oximetry test last in 2019 which was normal. He has never had a formal sleep study. He exercises on  the treadmill getting up to 8 METs per the treadmill calculation. He denies any chest pain/pressure/tightness, shortness of breath at rest or with exertion, light headedness/dizziness, pre-syncope, syncope, lower extremity swelling, paroxysmal nocturnal dyspnea (PND), or orthopnea.     Cardiac Problems and Cardiac Diagnostics     Most Recent Cardiac testing:  ECG dated 3/5/2022 (personaly reviewed and interpreted): atrial fibrillation with rapid ventricular response of 143 bpm, subtle inferolateral ST depressions    ECHO 2/21/2022 (report reviewed):   1. Normal left ventricular chamber size by 2D linear dimension; mildly enlarged by volume.   2. Calculated 2-D biplane volumetric left ventricular ejection fraction 61%.   3. No regional wall motion abnormalities.   4. Borderline enlarged right ventricular chamber size, normal systolic function, estimated right ventricular systolic pressure 21 mmHg (systolic blood pressure 148 mmHg).   5. Normal left ventricular filling pressure.   6. No  significant valvular heart disease.   7. Borderline enlarged sinus of Valsalva diameter (diameter 41 mm); upper normal for patient is 43 mm.   8. Compared to the report of 05/15/2014 no significant change has occurred.       Medications  Allergies   Current Outpatient Medications   Medication Sig Dispense Refill     Ascorbic Acid (VITAMIN C) 500 MG CHEW Take one tab daily       atorvastatin (LIPITOR) 20 MG tablet Take 20 mg by mouth daily        azelaic acid (FINACIA) 15 % external gel Apply 1 inch topically daily as needed        diclofenac (VOLTAREN) 1 % topical gel Apply 2 g topically daily as needed prn       losartan (COZAAR) 50 MG tablet Take 50 mg by mouth daily        metoprolol tartrate (LOPRESSOR) 50 MG tablet Take 1 tablet (50 mg) by mouth 2 times daily as needed (atrial fibrillation with heart rate greater than 120) May repeat dose after one hour if rate not improved with first dose 10 tablet 0     tamsulosin (FLOMAX) 0.4  MG capsule Take 0.4 mg by mouth daily        UNABLE TO FIND Apply 1 each to eye Administer 1 each into both eyes as needed. Med Name: Thera Tears       XARELTO ANTICOAGULANT 20 MG TABS tablet Take 1 tablet (20 mg) by mouth daily (with dinner) 90 tablet 3     hydrOXYzine (ATARAX) 10 MG tablet Take 10 mg by mouth daily as needed for itching Prn only (Patient not taking: Reported on 3/10/2022)       multivitamin (CENTRUM SILVER) tablet  (Patient not taking: Reported on 3/10/2022)        Allergies   Allergen Reactions     Mupirocin Hives     Hives, rash and itching when mupirocin used in nares while taking oral doxycycline (no previous reaction when using mupirocin alone or doxycycline alone)     Cats Difficulty breathing and Itching     Grass Extracts [Gramineae Pollens] Itching     cough        Physical Examination Review of Systems   /76 (BP Location: Left arm, Patient Position: Sitting, Cuff Size: Adult Regular)   Pulse 72   Resp 12   Ht 1.829 m (6')   Wt 94.3 kg (207 lb 12.8 oz)   BMI 28.18 kg/m    Body mass index is 28.18 kg/m .  Wt Readings from Last 3 Encounters:   03/10/22 94.3 kg (207 lb 12.8 oz)   03/05/22 93.9 kg (207 lb)   02/03/22 95.8 kg (211 lb 4.8 oz)       General Appearance:   Pleasant  male, appears  stated age. no acute distress, normal body habitus   ENT/Mouth: Facemask.   EYES:  no scleral icterus, normal conjunctivae   Neck: no carotid bruits. No anterior cervical lymphadenopaty   Respiratory:   lungs are clear to auscultation, no rales or wheezing,  equal chest wall expansion    Cardiovascular:   Regular rhythm, normal rate. Normal first and second heart sounds with no murmurs, rubs, or gallops; the carotid, radial and posterior tibial pulses are intact, Jugular venous pressure normal, no edema bilaterally    Abdomen/GI:  no organomegaly, masses, bruits, or tenderness; bowel sounds are present   Extremities: no cyanosis or clubbing   Skin: no xanthelasma, warm.    Heme/lymph/  Immunology No apparent bleeding noted.   Neurologic: Alert and oriented. normal gait, no tremors     Psychiatric: Pleasant, calm, appropriate affect.    A complete 10 system review of systems was performed and is negative except as mentioned in the HPI/subjective.         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 Unknown how fell but developed a black tooth after being dropped.       Past Surgical History:   Past Surgical History:   Procedure Laterality Date     EYE SURGERY      due to double vision 2018     HERNIA REPAIR      2010     NO HISTORY OF SURGERY         Family History:   Family History   Problem Relation Age of Onset     Hypertension Mother      Cancer Mother         lung      Other Cancer Mother         Lung cancer     Osteoporosis Mother      Hypertension Father      Cancer Father         lung      Other Cancer Father         Lung cancer     Pulmonary Embolism Sister      Breast Cancer Sister      Osteoporosis Sister      Other - See Comments Brother         prostate issue-pt had TURP      Osteopenia Sister      Osteoporosis Sister      Amblyopia No family hx of      Strabismus No family hx of      Colon Cancer No family hx of      Prostate Cancer No family hx of        Social History:   Social History     Socioeconomic History     Marital status:      Spouse name: Not on file     Number of children: Not on file     Years of education: Not on file     Highest education level: Not on file    Occupational History     Not on file   Tobacco Use     Smoking status: Never Smoker     Smokeless tobacco: Never Used   Substance and Sexual Activity     Alcohol use: Yes     Drug use: Never     Sexual activity: Not Currently     Birth control/protection: None   Other Topics Concern     Parent/sibling w/ CABG, MI or angioplasty before 65F 55M? No   Social History Narrative     Not on file     Social Determinants of Health     Financial Resource Strain: Not on file   Food Insecurity: Not on file   Transportation Needs: Not on file   Physical Activity: Not on file   Stress: Not on file   Social Connections: Not on file   Intimate Partner Violence: Not on file   Housing Stability: Not on file              Lab Results    Chemistry/lipid CBC Cardiac Enzymes/BNP/TSH/INR   Lab Results   Component Value Date    CHOL 173 02/01/2018    HDL 50 02/01/2018     02/01/2018    TRIG 91 02/01/2018    CR 0.79 03/06/2022    BUN 14 03/06/2022    POTASSIUM 4.0 03/06/2022     03/06/2022    CO2 22 03/06/2022      Lab Results   Component Value Date    WBC 6.7 03/06/2022    HGB 17.2 03/06/2022    HCT 50.3 03/06/2022    MCV 86 03/06/2022     03/06/2022      Lab Results   Component Value Date    TROPONINI <0.01 03/06/2022    TSH 1.34 09/27/2017    INR 1.88 (H) 03/06/2022          Lei Jang MD [facility]  Non-Invasive Cardiologist  [facility]  Pager [phone]

## 2022-03-15 ENCOUNTER — TELEPHONE (OUTPATIENT)
Dept: CARDIOLOGY | Facility: CLINIC | Age: 67
End: 2022-03-15

## 2022-03-15 ENCOUNTER — HOSPITAL ENCOUNTER (OUTPATIENT)
Dept: NUCLEAR MEDICINE | Facility: HOSPITAL | Age: 67
Discharge: HOME | End: 2022-03-15
Attending: GENERAL ACUTE CARE HOSPITAL
Payer: MEDICARE

## 2022-03-15 DIAGNOSIS — I48.0 PAROXYSMAL ATRIAL FIBRILLATION WITH RVR (H): Primary | ICD-10-CM

## 2022-03-15 DIAGNOSIS — R94.31 ST SEGMENT DEPRESSION: ICD-10-CM

## 2022-03-15 DIAGNOSIS — I48.0 PAROXYSMAL ATRIAL FIBRILLATION (H): ICD-10-CM

## 2022-03-15 DIAGNOSIS — I48.0 PAROXYSMAL ATRIAL FIBRILLATION WITH RVR (H): ICD-10-CM

## 2022-03-15 DIAGNOSIS — E78.5 HYPERLIPIDEMIA, UNSPECIFIED HYPERLIPIDEMIA TYPE: ICD-10-CM

## 2022-03-15 DIAGNOSIS — I10 ESSENTIAL HYPERTENSION: ICD-10-CM

## 2022-03-15 LAB — STRESS ECHO TARGET HR: 152

## 2022-03-15 PROCEDURE — G1004 CDSM NDSC: HCPCS | Performed by: INTERNAL MEDICINE

## 2022-03-15 PROCEDURE — 343N000001 HC RX 343: Performed by: GENERAL ACUTE CARE HOSPITAL

## 2022-03-15 PROCEDURE — A9500 TC99M SESTAMIBI: HCPCS | Performed by: GENERAL ACUTE CARE HOSPITAL

## 2022-03-15 PROCEDURE — 78451 HT MUSCLE IMAGE SPECT SING: CPT | Mod: 26 | Performed by: INTERNAL MEDICINE

## 2022-03-15 PROCEDURE — 78451 HT MUSCLE IMAGE SPECT SING: CPT | Mod: MG

## 2022-03-15 RX ADMIN — Medication 8.7 MCI.: at 13:31

## 2022-03-15 NOTE — TELEPHONE ENCOUNTER
Patient called back to my direct line.   He is feeling well and hives are starting to go away.  He is agreeable to a coronary CT angiogram. Informed patient the [facility]  will call him to arrange. Patient verbalized understanding and will await call to arrange. Patient has no further questions or concerns at this time. Order placed and message sent to Barby to call patient and schedule.

## 2022-04-01 ENCOUNTER — OFFICE VISIT (OUTPATIENT)
Dept: CARDIOLOGY | Facility: CLINIC | Age: 67
End: 2022-04-01
Attending: GENERAL ACUTE CARE HOSPITAL
Payer: MEDICARE

## 2022-04-01 VITALS
OXYGEN SATURATION: 97 % | HEART RATE: 67 BPM | DIASTOLIC BLOOD PRESSURE: 62 MMHG | SYSTOLIC BLOOD PRESSURE: 120 MMHG | WEIGHT: 209.2 LBS | BODY MASS INDEX: 28.37 KG/M2 | RESPIRATION RATE: 17 BRPM

## 2022-04-01 DIAGNOSIS — I48.92 ATRIAL FLUTTER, UNSPECIFIED TYPE (H): ICD-10-CM

## 2022-04-01 DIAGNOSIS — I48.0 PAROXYSMAL ATRIAL FIBRILLATION WITH RVR (H): Primary | ICD-10-CM

## 2022-04-01 PROBLEM — I48.91 ATRIAL FIBRILLATION (H): Status: ACTIVE | Noted: 2022-01-25

## 2022-04-01 PROCEDURE — 99215 OFFICE O/P EST HI 40 MIN: CPT | Performed by: NURSE PRACTITIONER

## 2022-04-01 RX ORDER — METOPROLOL TARTRATE 50 MG
50 TABLET ORAL 2 TIMES DAILY PRN
Qty: 60 TABLET | Refills: 0 | Status: SHIPPED | OUTPATIENT
Start: 2022-04-01 | End: 2022-05-02

## 2022-04-01 NOTE — LETTER
4/1/2022    Venu Dugan MD  [address]  [facility] [place] [address]    RE: Benjamin Hooper       Dear Colleague,     I had the pleasure of seeing Benjamin Hooper in the [facility].        Assessment/Recommendations   Assessment:    1.  Paroxysmal atrial fibrillation with RVR, possibly atrial flutter-initial documented onset 1/24/2022 with cardioversion at urgent care, second onset 3/6/2022 with cardioversion.  EKG during second episode of rapid A. fib shows possible ischemic changes and patient is undergoing ischemia work-up.  Patient reports undergoing cardiac work-up within 2 systems including [place] in the system.    -Discussed pathophysiology and chronic/aggressive nature of atrial fibrillation.  Discussed medication management versus procedural ablation.  Currently moving forward with medication management until ischemic work-up is completed and further monitoring and assessment of frequency and duration of atrial fibrillation can occur  -Metoprolol tartrate 50 mg at onset of atrial fibrillation, may repeat every 12 hours while still in A. fib and contact with cardiology clinic is made  -Discussed that controlled rate atrial fibrillation does not require emergency care  -Warned metoprolol can cause decreased resting heart rate and to seek care if heart rate becomes low and patient is symptomatic  -Aware that lifestyle modifications can improve atrial fibrillation such as ongoing exercise and weight management.  -Continue Xarelto 20 mg oral daily with meal  -Discussed Xarelto will be a lifelong need and can have further discussions about watchman in the future if desired  -Low threshold to move forward with ablation recommendation if atrial fibrillation recurs and rate continues to be elevated  -If ischemic work-up is negative patient may also be prescribed flecainide to accompany metoprolol    2.  Recently completed THU work-up and patient has mild THU and was not recommended to  "use CPAP but to side sleeping    [id number] score of 3: 1 age, 1 hypertension,  1 CAD and on Xarelto.    Benjamin Hooper will follow up in 3 months.       History of Present Illness/Subjective    Mr. Benjamin Hooper is a 66 year old male seen at [facility] today for management of paroxysmal atrial fibrillation.      Benjamin Hooper has a known history of hypertension, paroxysmal atrial fibrillation with RVR requiring 2 cardioversions 1/24/2022 in 3/6/2022, rosacea, BPH, anxiety.    Met with Lamine and wife daily at today's visit.  Lamine describes his episodes of atrial fibrillation in great detail.  He also reports having a \"skipped\" beat that is intermittent since 2014.  He describes being extremely frustrated after being diagnosed with atrial fibrillation and possible ischemic disease because he feels it is limiting his lifestyle and he is unable to work out or consume caffeine or alcohol.  We discussed the idea of moderation and that it is okay to have approximately 1 cup of caffeine and 1 alcoholic drink daily, also okay to do full/moderate exercise currently but wait to do anything difficult until after the stress test.  Symptoms with episodes of atrial fibrillation include dizziness, palpitations, rapid heartbeat, anxiety.  Today he denies fatigue, lightheadedness, shortness of breath, dyspnea on exertion, orthopnea, PND, palpitations, chest pain, abdominal fullness/bloating and lower extremity edema.      Cardiographics (reviewed):    ECHO 12/21/2022  Final Impressions   1. Normal left ventricular chamber size by 2D linear dimension; mildly enlarged by volume.   2. Calculated 2-D biplane volumetric left ventricular ejection fraction 61%.   3. No regional wall motion abnormalities.   4. Borderline enlarged right ventricular chamber size, normal systolic function, estimated right ventricular systolic pressure 21 mmHg (systolic blood pressure 148 mmHg).   5. Normal left ventricular " filling pressure.   6. No  significant valvular heart disease.   7. Borderline enlarged sinus of Valsalva diameter (diameter 41 mm); upper normal for patient is 43 mm.   8. Compared to the report of 05/15/2014 no significant change has occurred.    Cardiac testing personally reviewed:  Holter monitor 2/11/20221. The basic rhythm was sinus. The total analyzed time was 23h 53m. The heart rate varied from 46 to 119 bpm. The average HR was 68 bpm.   2. Premature ventricular complexes were noted singly and in two pairs. There were 760 PVCs recorded with a PVC burden of less than 1%.   3. Premature supraventricular complexes were noted singly, with aberrancy, in bigeminy, paired and in 3-5 beat atrial runs. The maximum atrial run rate was 123 BPM. There were 86 PACs recorded with a PAC burden of less than 1%.   4. A total of 1 symptomatic event was noted with no symptom recorded in which to correlate.        Physical Examination Review of Systems   Vitals: /62 (BP Location: Right arm, Patient Position: Sitting, Cuff Size: Adult Regular)   Pulse 67   Resp 17   Wt 94.9 kg (209 lb 3.2 oz)   SpO2 97%   BMI 28.37 kg/m    BMI= Body mass index is 28.37 kg/m .  Wt Readings from Last 3 Encounters:   04/01/22 94.9 kg (209 lb 3.2 oz)   03/10/22 94.3 kg (207 lb 12.8 oz)   03/05/22 93.9 kg (207 lb)       General Appearance:   Alert, cooperative and in no acute distress.   ENT/Mouth: membranes moist, no facial drooping   EYES:  no scleral icterus, normal conjunctivae   Neck: no JVD   Chest/Lungs:   lungs are clear to auscultation, no rales or wheezing, respirations unlabored   Cardiovascular:   Regular. Normal first and second heart sounds with no murmurs, rubs, or gallops; the radial and posterior tibial pulses are intact, no edema bilateral lower extremities    Abdomen:  Soft, nontender, nondistended, bowel sounds present   Extremities: no cyanosis or clubbing   Skin: warm, dry.    Neurologic: mood and affect are  appropriate, alert and oriented x3         Please refer above for cardiac ROS details.      Medical History  Surgical History Family History Social History   Past Medical History:   Diagnosis Date     Arthritis 1/1/2015    Base of thumb     Cancer (H) 1/1/2001    AFX skin tumor on chin     Cyclotropia 7/5/2018     Diplopia      H/O magnetic resonance imaging     MRI 10/02/17 with and without contrast (outside imaging) without abnormality     HLD (hyperlipidemia)      Hypertension      Nonsenile cataract      Paroxysmal atrial fibrillation (H) 1/25/2022     Plantar fasciitis 8/28/2020     Trauma     Trauma as child 1-2 years of age, dropped onto the ground. Unknown how fell but developed a black tooth after being dropped.     Past Surgical History:   Procedure Laterality Date     EYE SURGERY      due to double vision 2018     HERNIA REPAIR      2010     NO HISTORY OF SURGERY       Family History   Problem Relation Age of Onset     Hypertension Mother      Cancer Mother         lung      Other Cancer Mother         Lung cancer     Osteoporosis Mother      Hypertension Father      Cancer Father         lung      Other Cancer Father         Lung cancer     Pulmonary Embolism Sister      Breast Cancer Sister      Osteoporosis Sister      Other - See Comments Brother         prostate issue-pt had TURP      Osteopenia Sister      Osteoporosis Sister      Amblyopia No family hx of      Strabismus No family hx of      Colon Cancer No family hx of      Prostate Cancer No family hx of     Social History     Socioeconomic History     Marital status:      Spouse name: Not on file     Number of children: Not on file     Years of education: Not on file     Highest education level: Not on file   Occupational History     Not on file   Tobacco Use     Smoking status: Never Smoker     Smokeless tobacco: Never Used   Substance and Sexual Activity     Alcohol use: Yes     Drug use: Never     Sexual activity: Not Currently      Birth control/protection: None   Other Topics Concern     Parent/sibling w/ CABG, MI or angioplasty before 65F 55M? No   Social History Narrative     Not on file     Social Determinants of Health     Financial Resource Strain: Not on file   Food Insecurity: Not on file   Transportation Needs: Not on file   Physical Activity: Not on file   Stress: Not on file   Social Connections: Not on file   Intimate Partner Violence: Not on file   Housing Stability: Not on file          Medications  Allergies   Current Outpatient Medications   Medication Sig Dispense Refill     Ascorbic Acid (VITAMIN C) 500 MG CHEW Take one tab daily       atorvastatin (LIPITOR) 20 MG tablet Take 20 mg by mouth daily        azelaic acid (FINACIA) 15 % external gel Apply 1 inch topically daily as needed        diclofenac (VOLTAREN) 1 % topical gel Apply 2 g topically daily as needed prn       losartan (COZAAR) 50 MG tablet Take 50 mg by mouth daily        metoprolol tartrate (LOPRESSOR) 50 MG tablet Take 1 tablet (50 mg) by mouth 2 times daily as needed (atrial fibrillation with heart rate greater than 120) May repeat dose after one hour if rate not improved with first dose 60 tablet 0     multivitamin (CENTRUM SILVER) tablet        tamsulosin (FLOMAX) 0.4 MG capsule Take 0.4 mg by mouth daily        UNABLE TO FIND Apply 1 each to eye Administer 1 each into both eyes as needed. Med Name: Thera Tears       XARELTO ANTICOAGULANT 20 MG TABS tablet Take 1 tablet (20 mg) by mouth daily (with dinner) 90 tablet 3     hydrOXYzine (ATARAX) 10 MG tablet Take 10 mg by mouth daily as needed for itching Prn only (Patient not taking: Reported on 3/10/2022)      Allergies   Allergen Reactions     Mupirocin Hives     Hives, rash and itching when mupirocin used in nares while taking oral doxycycline (no previous reaction when using mupirocin alone or doxycycline alone)     Cats Difficulty breathing and Itching     Sestamibi [Technetium-99m] Hives     3/15/2022:   Pt developed hives on torso and limbs approximately 10 minutes after resting nuclear tracer given.  Pt has no complaints of shortness of breath or airway problems.  Did not proceed with the rest of the stress test.       Gramineae Pollens Itching     cough  cough  cough         Lab Results    Chemistry/lipid CBC Cardiac Enzymes/BNP/TSH/INR   Lab Results   Component Value Date    CHOL 173 02/01/2018    HDL 50 02/01/2018    TRIG 91 02/01/2018    BUN 14 03/06/2022     03/06/2022    CO2 22 03/06/2022    Lab Results   Component Value Date    WBC 6.7 03/06/2022    HGB 17.2 03/06/2022    HCT 50.3 03/06/2022    MCV 86 03/06/2022     03/06/2022    Lab Results   Component Value Date    TROPONINI <0.01 03/06/2022     No results found for: BNP, NTBNPI, NTBNP  Lab Results   Component Value Date    TSH 1.34 09/27/2017     Lab Results   Component Value Date    INR 1.88 (H) 03/06/2022        Total Time- 50 minutes spent on date of encounter doing chart review, history and exam, documentation and further activities as noted above.  This note has been dictated using voice recognition software. Any grammatical, typographical, or context distortions are unintentional and inherent to the software.    JOSE LUIS So [facility]    Thank you for allowing me to participate in the care of your patient.      Sincerely,     ROHINI So CNP [facility]  cc:   Lei Jang MD  [id number]  [place] [address]

## 2022-04-01 NOTE — PATIENT INSTRUCTIONS
Benjamin Hooper,    It was a pleasure to see you today at the [facility].     My recommendations after this visit include:    OK to use the metoprolol 50mg at onset of a fib, may repeat every 12 hours until you are able to get assistance    Continue Xarelto 20mg every day at same time with meal    OK to drink one coffee and one wine daily    OK to walk for exercise but not go hard until after the stress test.    *Please call if atrial fibrillation episodes more frequent or stuck in atrial fibrillation.      My contact information:  Anita Eden CNP  After Hours or Scheduling  [phone]  My Nurses phone number [phone]- normal business hours    Follow up in 3 months

## 2022-04-01 NOTE — PROGRESS NOTES
Assessment/Recommendations   Assessment:    1.  Paroxysmal atrial fibrillation with RVR, possibly atrial flutter-initial documented onset 1/24/2022 with cardioversion at urgent care, second onset 3/6/2022 with cardioversion.  EKG during second episode of rapid A. fib shows possible ischemic changes and patient is undergoing ischemia work-up.  Patient reports undergoing cardiac work-up within 2 systems including [place] in the system.    -Discussed pathophysiology and chronic/aggressive nature of atrial fibrillation.  Discussed medication management versus procedural ablation.  Currently moving forward with medication management until ischemic work-up is completed and further monitoring and assessment of frequency and duration of atrial fibrillation can occur  -Metoprolol tartrate 50 mg at onset of atrial fibrillation, may repeat every 12 hours while still in A. fib and contact with cardiology clinic is made  -Discussed that controlled rate atrial fibrillation does not require emergency care  -Warned metoprolol can cause decreased resting heart rate and to seek care if heart rate becomes low and patient is symptomatic  -Aware that lifestyle modifications can improve atrial fibrillation such as ongoing exercise and weight management.  -Continue Xarelto 20 mg oral daily with meal  -Discussed Xarelto will be a lifelong need and can have further discussions about watchman in the future if desired  -Low threshold to move forward with ablation recommendation if atrial fibrillation recurs and rate continues to be elevated  -If ischemic work-up is negative patient may also be prescribed flecainide to accompany metoprolol    2.  Recently completed THU work-up and patient has mild THU and was not recommended to use CPAP but to side sleeping    [id number] score of 3: 1 age, 1 hypertension,  1 CAD and on Xarelto.    Benjamin Hooper will follow up in 3 months.       History of Present Illness/Subjective    Mr. Benjamin MARI  "Sandie is a 66 year old male seen at [facility] today for management of paroxysmal atrial fibrillation.      Benjamin Hooper has a known history of hypertension, paroxysmal atrial fibrillation with RVR requiring 2 cardioversions 1/24/2022 in 3/6/2022, rosacea, BPH, anxiety.    Met with Lamine and wife daily at today's visit.  Lamine describes his episodes of atrial fibrillation in great detail.  He also reports having a \"skipped\" beat that is intermittent since 2014.  He describes being extremely frustrated after being diagnosed with atrial fibrillation and possible ischemic disease because he feels it is limiting his lifestyle and he is unable to work out or consume caffeine or alcohol.  We discussed the idea of moderation and that it is okay to have approximately 1 cup of caffeine and 1 alcoholic drink daily, also okay to do full/moderate exercise currently but wait to do anything difficult until after the stress test.  Symptoms with episodes of atrial fibrillation include dizziness, palpitations, rapid heartbeat, anxiety.  Today he denies fatigue, lightheadedness, shortness of breath, dyspnea on exertion, orthopnea, PND, palpitations, chest pain, abdominal fullness/bloating and lower extremity edema.      Cardiographics (reviewed):    ECHO 12/21/2022  Final Impressions   1. Normal left ventricular chamber size by 2D linear dimension; mildly enlarged by volume.   2. Calculated 2-D biplane volumetric left ventricular ejection fraction 61%.   3. No regional wall motion abnormalities.   4. Borderline enlarged right ventricular chamber size, normal systolic function, estimated right ventricular systolic pressure 21 mmHg (systolic blood pressure 148 mmHg).   5. Normal left ventricular filling pressure.   6. No  significant valvular heart disease.   7. Borderline enlarged sinus of Valsalva diameter (diameter 41 mm); upper normal for patient is 43 mm.   8. Compared to the report of 05/15/2014 no " significant change has occurred.    Cardiac testing personally reviewed:  Holter monitor 2/11/20221. The basic rhythm was sinus. The total analyzed time was 23h 53m. The heart rate varied from 46 to 119 bpm. The average HR was 68 bpm.   2. Premature ventricular complexes were noted singly and in two pairs. There were 760 PVCs recorded with a PVC burden of less than 1%.   3. Premature supraventricular complexes were noted singly, with aberrancy, in bigeminy, paired and in 3-5 beat atrial runs. The maximum atrial run rate was 123 BPM. There were 86 PACs recorded with a PAC burden of less than 1%.   4. A total of 1 symptomatic event was noted with no symptom recorded in which to correlate.        Physical Examination Review of Systems   Vitals: /62 (BP Location: Right arm, Patient Position: Sitting, Cuff Size: Adult Regular)   Pulse 67   Resp 17   Wt 94.9 kg (209 lb 3.2 oz)   SpO2 97%   BMI 28.37 kg/m    BMI= Body mass index is 28.37 kg/m .  Wt Readings from Last 3 Encounters:   04/01/22 94.9 kg (209 lb 3.2 oz)   03/10/22 94.3 kg (207 lb 12.8 oz)   03/05/22 93.9 kg (207 lb)       General Appearance:   Alert, cooperative and in no acute distress.   ENT/Mouth: membranes moist, no facial drooping   EYES:  no scleral icterus, normal conjunctivae   Neck: no JVD   Chest/Lungs:   lungs are clear to auscultation, no rales or wheezing, respirations unlabored   Cardiovascular:   Regular. Normal first and second heart sounds with no murmurs, rubs, or gallops; the radial and posterior tibial pulses are intact, no edema bilateral lower extremities    Abdomen:  Soft, nontender, nondistended, bowel sounds present   Extremities: no cyanosis or clubbing   Skin: warm, dry.    Neurologic: mood and affect are appropriate, alert and oriented x3         Please refer above for cardiac ROS details.      Medical History  Surgical History Family History Social History   Past Medical History:   Diagnosis Date     Arthritis 1/1/2015     Base of thumb     Cancer (H) 1/1/2001    AFX skin tumor on chin     Cyclotropia 7/5/2018     Diplopia      H/O magnetic resonance imaging     MRI 10/02/17 with and without contrast (outside imaging) without abnormality     HLD (hyperlipidemia)      Hypertension      Nonsenile cataract      Paroxysmal atrial fibrillation (H) 1/25/2022     Plantar fasciitis 8/28/2020     Trauma     Trauma as child 1-2 years of age, dropped onto the ground. Unknown how fell but developed a black tooth after being dropped.     Past Surgical History:   Procedure Laterality Date     EYE SURGERY      due to double vision 2018     HERNIA REPAIR      2010     NO HISTORY OF SURGERY       Family History   Problem Relation Age of Onset     Hypertension Mother      Cancer Mother         lung      Other Cancer Mother         Lung cancer     Osteoporosis Mother      Hypertension Father      Cancer Father         lung      Other Cancer Father         Lung cancer     Pulmonary Embolism Sister      Breast Cancer Sister      Osteoporosis Sister      Other - See Comments Brother         prostate issue-pt had TURP      Osteopenia Sister      Osteoporosis Sister      Amblyopia No family hx of      Strabismus No family hx of      Colon Cancer No family hx of      Prostate Cancer No family hx of     Social History     Socioeconomic History     Marital status:      Spouse name: Not on file     Number of children: Not on file     Years of education: Not on file     Highest education level: Not on file   Occupational History     Not on file   Tobacco Use     Smoking status: Never Smoker     Smokeless tobacco: Never Used   Substance and Sexual Activity     Alcohol use: Yes     Drug use: Never     Sexual activity: Not Currently     Birth control/protection: None   Other Topics Concern     Parent/sibling w/ CABG, MI or angioplasty before 65F 55M? No   Social History Narrative     Not on file     Social Determinants of Health     Financial Resource Strain:  Not on file   Food Insecurity: Not on file   Transportation Needs: Not on file   Physical Activity: Not on file   Stress: Not on file   Social Connections: Not on file   Intimate Partner Violence: Not on file   Housing Stability: Not on file          Medications  Allergies   Current Outpatient Medications   Medication Sig Dispense Refill     Ascorbic Acid (VITAMIN C) 500 MG CHEW Take one tab daily       atorvastatin (LIPITOR) 20 MG tablet Take 20 mg by mouth daily        azelaic acid (FINACIA) 15 % external gel Apply 1 inch topically daily as needed        diclofenac (VOLTAREN) 1 % topical gel Apply 2 g topically daily as needed prn       losartan (COZAAR) 50 MG tablet Take 50 mg by mouth daily        metoprolol tartrate (LOPRESSOR) 50 MG tablet Take 1 tablet (50 mg) by mouth 2 times daily as needed (atrial fibrillation with heart rate greater than 120) May repeat dose after one hour if rate not improved with first dose 60 tablet 0     multivitamin (CENTRUM SILVER) tablet        tamsulosin (FLOMAX) 0.4 MG capsule Take 0.4 mg by mouth daily        UNABLE TO FIND Apply 1 each to eye Administer 1 each into both eyes as needed. Med Name: Thera Tears       XARELTO ANTICOAGULANT 20 MG TABS tablet Take 1 tablet (20 mg) by mouth daily (with dinner) 90 tablet 3     hydrOXYzine (ATARAX) 10 MG tablet Take 10 mg by mouth daily as needed for itching Prn only (Patient not taking: Reported on 3/10/2022)      Allergies   Allergen Reactions     Mupirocin Hives     Hives, rash and itching when mupirocin used in nares while taking oral doxycycline (no previous reaction when using mupirocin alone or doxycycline alone)     Cats Difficulty breathing and Itching     Sestamibi [Technetium-99m] Hives     3/15/2022:  Pt developed hives on torso and limbs approximately 10 minutes after resting nuclear tracer given.  Pt has no complaints of shortness of breath or airway problems.  Did not proceed with the rest of the stress test.        Gramineae Pollens Itching     cough  cough  cough         Lab Results    Chemistry/lipid CBC Cardiac Enzymes/BNP/TSH/INR   Lab Results   Component Value Date    CHOL 173 02/01/2018    HDL 50 02/01/2018    TRIG 91 02/01/2018    BUN 14 03/06/2022     03/06/2022    CO2 22 03/06/2022    Lab Results   Component Value Date    WBC 6.7 03/06/2022    HGB 17.2 03/06/2022    HCT 50.3 03/06/2022    MCV 86 03/06/2022     03/06/2022    Lab Results   Component Value Date    TROPONINI <0.01 03/06/2022     No results found for: BNP, NTBNPI, NTBNP  Lab Results   Component Value Date    TSH 1.34 09/27/2017     Lab Results   Component Value Date    INR 1.88 (H) 03/06/2022        Total Time- 50 minutes spent on date of encounter doing chart review, history and exam, documentation and further activities as noted above.  This note has been dictated using voice recognition software. Any grammatical, typographical, or context distortions are unintentional and inherent to the software.    Anita Eden, [facility]

## 2022-04-04 ENCOUNTER — TELEPHONE (OUTPATIENT)
Dept: CARDIOLOGY | Facility: CLINIC | Age: 67
End: 2022-04-04
Payer: MEDICARE

## 2022-04-04 NOTE — TELEPHONE ENCOUNTER
"----- Message from Lei Jang MD sent at 4/3/2022  7:12 PM CDT -----  Regarding: RE: FFR DOES NOT MEET MEDICAL COVERAGE CRITERIA  Asha can we change the CTA order to say \"no\" for FFR? Thanks.  ----- Message -----  From: Yimi Brewster  Sent: 4/1/2022   2:29 PM CDT  To: Lei Jang MD  Subject: FFR DOES NOT MEET MEDICAL COVERAGE CRITERIA      Oralia Jang-     This patient is scheduled for a CTA ANGIOGRAM CORONARY ARTERY  on 04/06/2022.   The patient meets Medicare coverage criteria for the CTA itself but they do not meet criteria for the FFR CT.   The order question \"Do you want to order FFR CT if clinically indicated by the reading cardiologist?\" is marked \"Yes\". I want to give a heads up that if the FFR is done then the patient would not be covered. You or someone on your care team can change the answer to \"No\", so that the FFR is not done or the other option is there is only one qualifying DX for the Medicare medical policy for the FFR - which is R93.1 - Abnormal findings on diagnostic imaging of heart and coronary circulation. If this is a DX the patient has then it needs to be added to the order so that when the claim goes out it is listed.     Medical Policy link:   https://www.cms.gov/medicare-coverage-database/view/article.aspx?fdrbwxrvm=[email or web address]&akil=[email or web address]&keyword=&keywordType=starts&areaId=[email or web address]&docType=6,3,5,1,F,P&contractOption=all&hcpcsOption=code&xkxkvAeurkSegx=[id number]&bdydxDtwBvvv=[id number]&sortBy=title&bc=1     Please advise and let us know if you have any questions.     Thank you,   Yimi   Financial Securing Lead       "

## 2022-04-06 ENCOUNTER — HOSPITAL ENCOUNTER (OUTPATIENT)
Dept: CT IMAGING | Facility: CLINIC | Age: 67
Discharge: HOME | End: 2022-04-06
Attending: GENERAL ACUTE CARE HOSPITAL | Admitting: GENERAL ACUTE CARE HOSPITAL
Payer: MEDICARE

## 2022-04-06 VITALS — SYSTOLIC BLOOD PRESSURE: 134 MMHG | DIASTOLIC BLOOD PRESSURE: 75 MMHG

## 2022-04-06 DIAGNOSIS — R94.31 ST SEGMENT DEPRESSION: ICD-10-CM

## 2022-04-06 DIAGNOSIS — I48.0 PAROXYSMAL ATRIAL FIBRILLATION WITH RVR (H): ICD-10-CM

## 2022-04-06 DIAGNOSIS — E78.5 HYPERLIPIDEMIA, UNSPECIFIED HYPERLIPIDEMIA TYPE: ICD-10-CM

## 2022-04-06 DIAGNOSIS — I10 ESSENTIAL HYPERTENSION: ICD-10-CM

## 2022-04-06 DIAGNOSIS — I48.0 PAROXYSMAL ATRIAL FIBRILLATION (H): ICD-10-CM

## 2022-04-06 LAB — BSA FOR ECHO PROCEDURE: 1 M2

## 2022-04-06 PROCEDURE — 250N000011 HC RX IP 250 OP 636: Performed by: GENERAL ACUTE CARE HOSPITAL

## 2022-04-06 PROCEDURE — 75574 CT ANGIO HRT W/3D IMAGE: CPT | Mod: 26 | Performed by: INTERNAL MEDICINE

## 2022-04-06 PROCEDURE — G1004 CDSM NDSC: HCPCS | Performed by: INTERNAL MEDICINE

## 2022-04-06 PROCEDURE — 250N000013 HC RX MED GY IP 250 OP 250 PS 637: Performed by: GENERAL ACUTE CARE HOSPITAL

## 2022-04-06 PROCEDURE — 75574 CT ANGIO HRT W/3D IMAGE: CPT | Mod: MG

## 2022-04-06 RX ORDER — IOPAMIDOL 755 MG/ML
100 INJECTION, SOLUTION INTRAVASCULAR ONCE
Status: COMPLETED | OUTPATIENT
Start: 2022-04-06 | End: 2022-04-06

## 2022-04-06 RX ORDER — DILTIAZEM HYDROCHLORIDE 5 MG/ML
10 INJECTION INTRAVENOUS
Status: DISCONTINUED | OUTPATIENT
Start: 2022-04-06 | End: 2022-04-07 | Stop reason: HOSPADM

## 2022-04-06 RX ORDER — DILTIAZEM HYDROCHLORIDE 5 MG/ML
5-25 INJECTION INTRAVENOUS
Status: DISCONTINUED | OUTPATIENT
Start: 2022-04-06 | End: 2022-04-07 | Stop reason: HOSPADM

## 2022-04-06 RX ORDER — METOPROLOL TARTRATE 1 MG/ML
5-20 INJECTION, SOLUTION INTRAVENOUS
Status: DISCONTINUED | OUTPATIENT
Start: 2022-04-06 | End: 2022-04-07 | Stop reason: HOSPADM

## 2022-04-06 RX ORDER — NITROGLYCERIN 0.4 MG/1
0.4 TABLET SUBLINGUAL ONCE
Status: COMPLETED | OUTPATIENT
Start: 2022-04-06 | End: 2022-04-06

## 2022-04-06 RX ADMIN — NITROGLYCERIN 0.4 MG: 0.4 TABLET SUBLINGUAL at 07:56

## 2022-04-06 RX ADMIN — IOPAMIDOL 100 ML: 755 INJECTION, SOLUTION INTRAVENOUS at 08:00

## 2022-04-11 NOTE — TELEPHONE ENCOUNTER
Subjective  Answers for HPI/ROS submitted by the patient on 4/11/2022  If you checked off any problems, how difficult have these problems made it for you to do your work, take care of things at home, or get along with other people?: Very difficult  PHQ9 TOTAL SCORE: 11  MARCELINA 7 TOTAL SCORE: 6      Marcelina is a 60 year old who presents for the following health issues     [facility]       Hospital Follow-up Visit:    Hospital/Nursing Home/IP Rehab Facility: [facility]  Date of Admission: 3/28/2022  Date of Discharge: 3/31/2022  Reason(s) for Admission: Severe Ischemic Colitis       Was your hospitalization related to COVID-19? No   Problems taking medications regularly:  None  Medication changes since discharge: Added some medications for therapy.   Problems adhering to non-medication therapy:  None    Summary of hospitalization:  Discharge summary unavailable  Diagnostic Tests/Treatments reviewed.  Follow up needed: Gastroenterology     Other Healthcare Providers Involved in Patient s Care:         Specialist appointment - rheumatology and will make follow up with gastroenterology   Update since discharge: improved.       Post Discharge Medication Reconciliation: discharge medications reconciled and changed, per note/orders.  Plan of care communicated with patient              Patient here to follow up due to recent hospitalization due to ischemic colitis   Is doing better however during this episode had rectal bleeding and abdominal pain   Had a colonoscopy in the hospital but not sure what pathology was however will reach out to the hospital to get these results  States did have some anemia  Lab work is in process  Labs reviewed in EPIC  BP Readings from Last 3 Encounters:   04/11/22 110/60   11/10/21 136/86   10/29/21 103/64    Wt Readings from Last 3 Encounters:   04/11/22 61 kg (134 lb 8 oz)   11/10/21 63.5 kg (140 lb 1.6 oz)   10/29/21 63.5 kg (140 lb 1.6 oz)                  Patient Active  ----- Message from Lei Jang MD sent at 3/15/2022  3:16 PM CDT -----  Regarding: RE: Reaction to nuclear tracer?  Agree with plan. Sounds like a mild reaction. Will avoid sestamibi in the future.    Asha, would he be agreeable to a coronary CT angiogram?    Thanks,  Lei  ----- Message -----  From: Amparo Holman RN  Sent: 3/15/2022   2:35 PM CDT  To: Asha Guthrie RN, Lei Jang MD  Subject: Reaction to nuclear tracer?                      Hi Cher Wilkinsry was in today for his nuclear stress test.  He developed hives on his torso and limbs approximately 10 minutes after receiving the resting nuclear tracer.  He did not have any other symptoms.  No SOB or airway issues, just itchy hives.  I discussed this with Dr Bolaños who was our nuclear reader today and he recommended that we add Sestamibi to his allergy list and to not go forward with the stress portion.  I did discuss with the pt that he could take benadryl when he gets home is he feels like his hives are not resolving and that certainly if it worsens, he should be seen in the emergency room for further treatment.  He seemed agreeable and ok with that plan.  I told him you would follow up with him for a future plan for stress testing, if needed.  I believe Dr Bolaños was going to read out his resting images, since they were done.      Amparo Holman RN       Problem List   Diagnosis     Hypertension goal BP (blood pressure) < 140/90     Hypertriglyceridemia     Night sweats     Atopic rhinitis     Moderate episode of recurrent major depressive disorder (H)     Keratitis sicca, bilateral (H)     Hyperlipidemia LDL goal <100     CKD (chronic kidney disease) stage 1, GFR 90 ml/min or greater     Menopausal syndrome (hot flashes)     Allergic rhinitis due to pollen     Status post total hysterectomy     Anemia in other chronic diseases classified elsewhere     ACP (advance care planning)     Tension headache     MARCELINA (generalized anxiety disorder)     Raynaud's disease without gangrene     CREST (calcinosis, Raynaud's phenomenon, esophageal dysfunction, sclerodactyly, telangiectasia) (H)     Limited systemic sclerosis (H)     Marijuana use, episodic     NSTEMI (non-ST elevated myocardial infarction) (H)     Panic attack     Atrophic vaginitis     Past Surgical History:   Procedure Laterality Date     APPENDECTOMY       BIOPSY      cervical node     COLONOSCOPY N/A 11/10/2021    Procedure: COLONOSCOPY, FLEXIBLE, WITH LESION REMOVAL USING SNARE;  Surgeon: Domingo Wall MD;  Location: [facility] OR     COLONOSCOPY N/A 11/10/2021    Procedure: COLONOSCOPY, WITH POLYPECTOMY AND BIOPSY;  Surgeon: Domingo Wall MD;  Location: [facility] OR     COLONOSCOPY WITH CO2 INSUFFLATION N/A 10/26/2020    Procedure: COLONOSCOPY, WITH CO2 INSUFFLATION;  Surgeon: Phyllis Chaudhari MD;  Location: [facility] OR     COLONOSCOPY WITH CO2 INSUFFLATION N/A 11/10/2021    Procedure: COLONOSCOPY, WITH CO2 INSUFFLATION;  Surgeon: Domingo Wall MD;  Location: [facility]     DILATION AND CURETTAGE, HYSTEROSCOPY DIAGNOSTIC, COMBINED  7/1/2014    Procedure: COMBINED DILATION AND CURETTAGE, HYSTEROSCOPY DIAGNOSTIC;  Surgeon: Mana Cabrera DO;  Location: [facility] OR     ENT SURGERY      tonsillectomy and adnoid removal     HYSTERECTOMY TOTAL ABDOMINAL       ORTHOPEDIC SURGERY      left knee  tear meniscus     RELEASE CARPAL TUNNEL BILATERAL         Social History     Tobacco Use     Smoking status: Former Smoker     Quit date: 2001     Years since quittin.2     Smokeless tobacco: Never Used   Substance Use Topics     Alcohol use: Yes     Comment: social     Family History   Problem Relation Age of Onset     Osteoporosis Mother      Eye Disorder Mother         cataract, mac degen     Macular Degeneration Mother      Dementia Mother      Osteoporosis Father      Eye Disorder Father         glaucoma     Hypertension Maternal Grandmother      Diabetes Maternal Grandfather      Eye Disorder Paternal Grandmother         glaucoma     Unknown/Adopted Paternal Grandfather      Hypertension Daughter      Diabetes Other      Glaucoma No family hx of          Current Outpatient Medications   Medication Sig Dispense Refill     albuterol (PROAIR HFA) 108 (90 Base) MCG/ACT inhaler INHALE 2 PUFS BY MOUTH EVERY 6 HOURS AS NEEDED FOR SHORTNESS OF BREATH / DYSPNEA 8.5 g 3     ALLEGRA ALLERGY 180 MG tablet Take 1 tablet (180 mg) by mouth daily 90 tablet 3     buPROPion (WELLBUTRIN XL) 150 MG 24 hr tablet TAKE 1 TABLET BY MOUTH EVERY MORNING 90 tablet 0     Calcium Carbonate-Vit D-Min (RA CALCIUM 600/VIT D/MINERALS) 600-200 MG-UNIT TABS Take 1 tablet by mouth daily       FLUoxetine (PROZAC) 20 MG capsule TAKE 1 CAPSULE (20 MG) BY MOUTH DAILY (DUE FOR FOLLOW UP WITH PCP FOR FUTURE REFILLS) 90 capsule 0     FLUoxetine (PROZAC) 40 MG capsule Take 1 capsule (40 mg) by mouth daily 90 capsule 0     fluticasone (FLONASE) 50 MCG/ACT nasal spray Spray 1-2 sprays into both nostrils daily 16 g 5     hydrochlorothiazide (HYDRODIURIL) 25 MG tablet Take 1 tablet (25 mg) by mouth in the morning. 90 tablet 2     hydroxychloroquine (PLAQUENIL) 200 MG tablet Take 400 mg on // and 200 mg on rest of the days. (Patient taking differently: 1.5 tabs daily) 120 tablet 1     isosorbide mononitrate (IMDUR) 30 MG 24 hr tablet Take 1  tablet (30 mg) by mouth daily 90 tablet 2     losartan (COZAAR) 50 MG tablet TAKE 1 TABLET BY MOUTH EVERY DAY 90 tablet 0     NIFEdipine ER (ADALAT CC) 30 MG 24 hr tablet Take 30 mg by mouth in the morning.       NIFEdipine ER OSMOTIC (PROCARDIA XL) 30 MG 24 hr tablet Take 1 tablet (30 mg) by mouth daily 90 tablet 2     omeprazole (PRILOSEC) 40 MG DR capsule Take 1 capsule (40 mg) by mouth daily 90 capsule 2     pantoprazole (PROTONIX) 40 MG EC tablet Take 1 tablet (40 mg) by mouth in the morning. 90 tablet 1     traZODone (DESYREL) 50 MG tablet TAKE 1 TABLET (50MG) BY MOUTH EVERY NIGHT AT BEDTIME 90 tablet 0     VITAMIN D, CHOLECALCIFEROL, PO Take 2,000 Units by mouth daily        diclofenac (VOLTAREN) 1 % topical gel Apply 2 g topically 4 times daily to hands (Patient not taking: Reported on 4/11/2022) 1 Tube 1     estradiol (ESTRACE) 0.1 MG/GM vaginal cream Place 2 g vaginally twice a week (Patient not taking: Reported on 4/11/2022) 42.5 g 3     LORazepam (ATIVAN) 0.5 MG tablet Take 1 tablet (0.5 mg) by mouth daily as needed (panic attack) (Patient not taking: Reported on 4/11/2022) 30 tablet 1     order for DME Equipment being ordered: light lamp for seasonal affective disorder 1 Device 0     simvastatin (ZOCOR) 40 MG tablet TAKE 1 TABLET BY MOUTH EVERYDAY AT BEDTIME 30 tablet 0     Allergies   Allergen Reactions     Seasonal Allergies      Sulfa Drugs      Vomiting       Vicodin [Hydrocodone-Acetaminophen] Nausea     Other Environmental Allergy Other (See Comments)       Review of Systems   CONSTITUTIONAL:POSITIVE  for weight loss and NEGATIVE  for chills and fever   ENT/MOUTH: NEGATIVE for ear, mouth and throat problems  RESP:NEGATIVE for significant cough or SOB  CV: POSITIVE for HX HTN and hx CAD and has not seen cardiology in 2 years  NEGATIVE for diaphoresis, irregular heart beat and lower extremity edema  GI: POSITIVE for abdominal pain mild now  and NEGATIVE for jaundice, melena, nausea and vomiting  :  "will get up 4 times at night to urinate. No frequency during the day   MUSCULOSKELETAL: NEGATIVE for significant arthralgias or myalgia and POSITIVE  for arthralgias   NEURO: NEGATIVE for weakness, dizziness or paresthesias  ENDOCRINE: NEGATIVE for temperature intolerance, skin/hair changes  HEME/ALLERGY/IMMUNE: POSITIVE  for allergies and hx anemia  and NEGATIVE for night sweats and swollen nodes      Objective    /60   Pulse 68   Temp 98.5  F (36.9  C) (Temporal)   Resp 20   Ht 1.534 m (5' 0.4\")   Wt 61 kg (134 lb 8 oz)   LMP 06/12/2014   SpO2 98%   BMI 25.92 kg/m    Body mass index is 25.92 kg/m .   Wt Readings from Last 4 Encounters:   04/11/22 61 kg (134 lb 8 oz)   11/10/21 63.5 kg (140 lb 1.6 oz)   10/29/21 63.5 kg (140 lb 1.6 oz)   09/17/21 62.9 kg (138 lb 9 oz)       Physical Exam   GENERAL: Patient is well nourished, well developed,in no apparent distress  EYES: Eyes grossly normal to inspection and conjunctivae and sclerae normal  HENT:ear canals and TM's normal and nose and mouth without ulcers or lesions   NECK:normal, supple and no adenopathy  CARDIAC:regular rates and rhythm, no murmur, click or rub and no irregular beats  without LE edema bilaterally  RESP: normal respiratory rate and rhythm, lungs clear to auscultation  unlabored respirations, no intercostal retractions or accessory muscle use  ABD:soft, nontender  SKIN: Skin color, texture, turgor normal. No rashes or lesions.  MS: extremities normal- no gross deformities noted, gait normal and normal muscle tone  NEURO: mentation intact and speech normal  PSYCH: Alert, oriented, thought content appropriate,mentation appears normal., affect and mood normal      Diagnostic Test Results:   Diagnostic Test Results:  Labs reviewed in [facility] Outpatient Visit on 11/10/2021   Component Date Value Ref Range Status     Case Report 11/10/2021    Final                    Value:Surgical Pathology Report                         Case: " [id number]                                  Authorizing Provider:  Domingo Wall      Collected:           11/10/2021 12:51 PM                                 MD Fausto                                                                     Ordering Location:     [facility]    Received:            11/10/2021 03:04 PM                                 [facility]                                                                     Pathologist:           Lili Reilly MD                                                           Specimens:   A) - Large Intestine, Colon, Ascending, previous polyp scar eval for residual adenoma               B) - Large Intestine, Colon, Transverse, Transverse colon                                   Final Diagnosis 11/10/2021    Final                    Value:This result contains rich text formatting which cannot be displayed here.     Clinical Information 11/10/2021    Final                    Value:This result contains rich text formatting which cannot be displayed here.     Gross Description 11/10/2021    Final                    Value:This result contains rich text formatting which cannot be displayed here.     Microscopic Description 11/10/2021    Final                    Value:This result contains rich text formatting which cannot be displayed here.     Performing Labs 11/10/2021    Final                    Value:This result contains rich text formatting which cannot be displayed here.     COLONOSCOPY 11/10/2021    Final                    Value:[facility]  Endoscopy Department-[place]  _______________________________________________________________________________  Patient Name: Marcelina Estevez            Procedure Date: 11/10/2021 12:08 PM  MRN: [id number]                       YOB: 1962  Admit Type: Outpatient                Age: 59  Gender: Female                        Note Status: Finalized  Attending MD: Domingo  MD Duke  Instrument Name: [id number]  _______________________________________________________________________________     Procedure:                Colonoscopy  Indications:              Therapeutic procedure for known colon adenoma  Providers:                Domingo Wall MD  Referring MD:             Annette Painting MD  Medicines:                Monitored Anesthesia Care  Complications:            No immediate complications.  _______________________________________________________________________________  Procedure:                Pre-Anesthesia Asses                          sment:                            - Prior to the procedure, a History and Physical                             was performed, and patient medications and                             allergies were reviewed. The patient is competent.                             The risks and benefits of the procedure and the                             sedation options and risks were discussed with the                             patient. All questions were answered and informed                             consent was obtained. Patient identification and                             proposed procedure were verified by the physician,                             the nurse and the anesthetist in the pre-procedure                             area in the endoscopy suite. Mental Status                             Examination: alert and oriented. Airway                             Examination: normal oropharyngeal airway and neck                             mobility and Mallampati Class II (the uvula but not                                                       tonsillar pillars visualized). Respiratory                             Examination: clear to auscultation. CV Examination:                             normal. Prophylactic Antibiotics: The patient does                             not require prophylactic antibiotics. Prior                              Anticoagulants: The patient has taken no                             anticoagulant or antiplatelet agents. ASA Grade                             Assessment: II - A patient with mild systemic                             disease. After reviewing the risks and benefits,                             the patient was deemed in satisfactory condition to                             undergo the procedure. The anesthesia plan was to                             use monitored anesthesia care (MAC). Immediately                             prior to administration of medications, the patient                             was re-assessed for adequacy to receive sedatives.                                                       The heart rate, respiratory rate, oxygen                             saturations, blood pressure, adequacy of pulmonary                             ventilation, and response to care were monitored                             throughout the procedure. The physical status of                             the patient was re-assessed after the procedure.                            After obtaining informed consent, the colonoscope                             was passed under direct vision. Throughout the                             procedure, the patient's blood pressure, pulse, and                             oxygen saturations were monitored continuously. The                             was introduced through the anus and advanced to the                             terminal ileum, with identification of the                             appendiceal orifice and IC valve. The colonoscopy                             was performed without difficulty. The patient                                                       tolerated the procedure well. The quality of the                             bowel preparation was evaluated using the BBPS                             ([place] Bowel Preparation Scale) with scores of:                              Right Colon = 3, Transverse Colon = 3 and Left                             Colon = 3 (entire mucosa seen well with no residual                             staining, small fragments of stool or opaque                             liquid). The total BBPS score equals 9.                                                                                   Findings:       The perianal and digital rectal examinations were normal.       The terminal ileum appeared normal.       A small post polypectomy scar was found at the ileocecal valve. There        was no evidence of the previous polyp.       A 20 mm post polypectomy scar was found in the ascending colon. 5mm area        of polypoid tissue. Cold snare polypectomy performed to that area.       A tattoo was                           seen in the transverse colon. The tattoo site appeared        normal.       A 20 mm polyp was found in the transverse colon. The polyp was sessile.        Area was successfully injected with 8 mL Eleview for a lift polypectomy.        The polyp was removed with a piecemeal technique using a cold snare.        Egdes cleaned with cold biopsy avulsion. Resection and retrieval were        complete. Verification of patient identification for the specimen was        done. Estimated blood loss was minimal. To prevent bleeding after the        polypectomy, one hemostatic clip was successfully placed (MR        conditional). There was no bleeding at the end of the procedure.       Many medium-mouthed diverticula were found in the sigmoid colon and        descending colon.       Non-bleeding internal hemorrhoids were found during retroflexion. The        hemorrhoids were small and Grade I (internal hemorrhoids that do not        prolapse).                                                                                                             Moderate Sedation:       Moderate Sedation was not administered  Impression:               -  The examined portion of the ileum was normal.                            - Post-polypectomy scar at the ileocecal valve.                            - Post-polypectomy scar in the ascending colon. 5mm                             area of polypoid tissue removed with cold snare.                            - A tattoo was seen in the transverse colon. The                             tattoo site appeared normal.                            - One 20 mm polyp in the transverse colon, removed                             piecemeal using a cold snare. Resected and                             retrieved. Injected. Clip (MR conditional) was                             placed.                            - Diverticulosis in the sigmoid colon and in the                             descending colon.                            - Non-bleeding internal hemorrhoids.  Recommendation:                                     - Discharge patient to home (ambulatory).                            - Patient has a contact number available for                             emergencies. The signs and symptoms of potential                             delayed complications were discussed with the                             patient. Return to normal activities tomorrow.                             Written discharge instructions were provided to the                             patient.                            - Await pathology results.                            - Repeat colonoscopy in 6 months for surveillance                             after piecemeal polypectomy.                            - Return to primary care physician.                            - There is a slightly increased risk of immediate                             and delayed bleeding with removal of large polyps.                             Due to this risk, I recommend avoiding blood                             thinners, NSAID's, and ASA for 2                           weeks unless                              absolutely necessary for the managment of other                             health conditions. Avoid heavy lifting (>20lbs) or                             long distance travel for 2 weeks. If rectal                             bleeding occurs, call the nurse advice line or                             proceed to the nearest emergency depeartment.                                                                                       ________________________  Domingo Wall MD  11/10/2021 1:39:20 PM  I was physically present for the entire viewing portion of the exam.Domingo Wall MD  Number of Addenda: 0    Note Initiated On: 11/10/2021 12:08 PM  Scope In:  Scope Out:       Results for orders placed or performed in visit on 04/11/22   Comprehensive metabolic panel     Status: Abnormal   Result Value Ref Range    Sodium 133 133 - 144 mmol/L    Potassium 4.5 3.4 - 5.3 mmol/L    Chloride 101 94 - 109 mmol/L    Carbon Dioxide (CO2) 25 20 - 32 mmol/L    Anion Gap 7 3 - 14 mmol/L    Urea Nitrogen 30 7 - 30 mg/dL    Creatinine 1.13 (H) 0.52 - 1.04 mg/dL    Calcium 9.5 8.5 - 10.1 mg/dL    Glucose 94 70 - 99 mg/dL    Alkaline Phosphatase 55 40 - 150 U/L    AST 15 0 - 45 U/L    ALT 20 0 - 50 U/L    Protein Total 7.2 6.8 - 8.8 g/dL    Albumin 3.4 3.4 - 5.0 g/dL    Bilirubin Total 0.3 0.2 - 1.3 mg/dL    GFR Estimate 55 (L) >60 mL/min/1.73m2   CBC with platelets and differential     Status: Abnormal   Result Value Ref Range    WBC Count 8.4 4.0 - 11.0 10e3/uL    RBC Count 3.13 (L) 3.80 - 5.20 10e6/uL    Hemoglobin 9.8 (L) 11.7 - 15.7 g/dL    Hematocrit 29.9 (L) 35.0 - 47.0 %    MCV 96 78 - 100 fL    MCH 31.3 26.5 - 33.0 pg    MCHC 32.8 31.5 - 36.5 g/dL    RDW 13.2 10.0 - 15.0 %    Platelet Count 301 150 - 450 10e3/uL    % Neutrophils 73 %    % Lymphocytes 10 %    % Monocytes 15 %    % Eosinophils 2 %    % Basophils 1 %    Absolute Neutrophils 6.2 1.6 - 8.3 10e3/uL    Absolute Lymphocytes  0.8 0.8 - 5.3 10e3/uL    Absolute Monocytes 1.2 0.0 - 1.3 10e3/uL    Absolute Eosinophils 0.1 0.0 - 0.7 10e3/uL    Absolute Basophils 0.0 0.0 - 0.2 10e3/uL   Vitamin B12     Status: Normal   Result Value Ref Range    Vitamin B12 854 193 - 986 pg/mL   Iron & Iron Binding Capacity     Status: Abnormal   Result Value Ref Range    Iron 41 35 - 180 ug/dL    Iron Binding Capacity 310 240 - 430 ug/dL    Iron Sat Index 13 (L) 15 - 46 %   Ferritin     Status: Normal   Result Value Ref Range    Ferritin 119 8 - 252 ng/mL   CBC with Platelets & Differential     Status: Abnormal    Narrative    The following orders were created for panel order CBC with Platelets & Differential.  Procedure                               Abnormality         Status                     ---------                               -----------         ------                     CBC with platelets and d...[[id number]]  Abnormal            Final result                 Please view results for these tests on the individual orders.         Assessment & Plan     Ischemic colitis (H)  FOLLOW UP WITH SPECIALIST :Gastroenterology  - CBC with Platelets & Differential  - Comprehensive metabolic panel  - Adult Gastro Ref - Consult Only  - pantoprazole (PROTONIX) 40 MG EC tablet  Dispense: 90 tablet; Refill: 1  Will follow up and/or notify patient of  results via My Chart to determine further need for followup      -Hypertension goal BP (blood pressure) < 140/90  HTN Plan:  1)  Medication: continue current medication regimen unchanged  2)  Dietary sodium restriction  3)  Regular aerobic exercise  4)  Recheck in 2 weeks, sooner should new symptoms or   problems arise.  5) See todays orders.    Patient Education: Reviewed risks of hypertension and principles of   treatment.  - hydrochlorothiazide (HYDRODIURIL) 25 MG tablet  Dispense: 90 tablet; Refill: 2    Anemia, unspecified type    - Iron & Iron Binding Capacity  - Ferritin  - Vitamin B12  -Will follow up and/or notify  "patient of  results via My Chart to determine further need for followup      Hyponatremia  Continue current medications as prescribed.   Will follow up and/or notify patient of  results via My Chart to determine further need for followup  - hydrochlorothiazide (HYDRODIURIL) 25 MG tablet  Dispense: 90 tablet; Refill: 2    CKD (chronic kidney disease) stage 1, GFR 90 ml/min or greater  Current treatment plan is appropriate, no change in therapy  Avoid NSAIDs    Decreased GFR  Symptomatic therapy suggested: rest, increase fluids and call prn if symptoms persist or worsen.      CREST (calcinosis, Raynaud's phenomenon, esophageal dysfunction, sclerodactyly, telangiectasia) (H)  FOLLOW UP WITH SPECIALIST :Rheumatology    Coronary vasospasm (H)  FOLLOW UP WITH SPECIALIST :Cardiology      Moderate episode of recurrent major depressive disorder (H)  Reviewed concept of depression as function of biochemical imbalance of neurotransmitters/rationale for treatment.  Risks and benefits of medication(s) reviewed with patient.  Questions answered.  Followup appointment in 2 week(s)  Patient instructed to call for significant side effects medications or problems        Encounter for screening mammogram for breast cancer  - MA SCREENING DIGITAL BILAT - Future  (s+30)      Review of prior external note(s) from - Outside records from [facility]   Ordering of each unique test  Prescription drug management  No LOS data to display   Time spent doing chart review, history and exam, documentation and further activities per the note       BMI:   Estimated body mass index is 25.92 kg/m  as calculated from the following:    Height as of this encounter: 1.534 m (5' 0.4\").    Weight as of this encounter: 61 kg (134 lb 8 oz).       See Patient Instructions  Patient Instructions     PLAN:   1.   Symptomatic therapy suggested: Continue current medications as prescribed.   2.  Orders Placed This Encounter   Medications     NIFEdipine ER " (ADALAT CC) 30 MG 24 hr tablet     Sig: Take 30 mg by mouth in the morning.     DISCONTD: pantoprazole (PROTONIX) 40 MG EC tablet     Sig: TAKE 1 TABLET BY MOUTH EVERY 12 HOURS     pantoprazole (PROTONIX) 40 MG EC tablet     Sig: Take 1 tablet (40 mg) by mouth in the morning.     Dispense:  90 tablet     Refill:  1     hydrochlorothiazide (HYDRODIURIL) 25 MG tablet     Sig: Take 1 tablet (25 mg) by mouth in the morning.     Dispense:  90 tablet     Refill:  2     Orders Placed This Encounter   Procedures     MA SCREENING DIGITAL BILAT - Future  (s+30)     Comprehensive metabolic panel     Adult Gastro Ref - Consult Only     CBC with Platelets & Differential       3. Patient needs to follow up in if no improvement,or sooner if worsening of symptoms or other symptoms develop.  CONSULTATION/REFERRAL to Gastroenterology  Please call [phone] to make appointment  if you do not hear from referrals in the next few days.   Will follow up and/or notify patient of  results via My Chart to determine further need for followup  Follow up in 2 weeks with Dr Painting          Return in about 2 weeks (around 4/25/2022), or if symptoms worsen or fail to improve.    EDUARD Milner [facility]

## 2022-05-05 ENCOUNTER — OFFICE VISIT (OUTPATIENT)
Dept: CARDIOLOGY | Facility: CLINIC | Age: 67
End: 2022-05-05
Payer: MEDICARE

## 2022-05-05 VITALS
HEART RATE: 60 BPM | WEIGHT: 211.6 LBS | SYSTOLIC BLOOD PRESSURE: 140 MMHG | HEIGHT: 72 IN | BODY MASS INDEX: 28.66 KG/M2 | RESPIRATION RATE: 16 BRPM | DIASTOLIC BLOOD PRESSURE: 78 MMHG | OXYGEN SATURATION: 99 %

## 2022-05-05 DIAGNOSIS — I25.10 CORONARY ARTERY DISEASE INVOLVING NATIVE CORONARY ARTERY WITHOUT ANGINA PECTORIS, UNSPECIFIED WHETHER NATIVE OR TRANSPLANTED HEART: ICD-10-CM

## 2022-05-05 DIAGNOSIS — I48.19 PERSISTENT ATRIAL FIBRILLATION (H): ICD-10-CM

## 2022-05-05 DIAGNOSIS — I10 HYPERTENSION, UNSPECIFIED TYPE: ICD-10-CM

## 2022-05-05 DIAGNOSIS — E78.5 DYSLIPIDEMIA: ICD-10-CM

## 2022-05-05 DIAGNOSIS — I48.0 PAROXYSMAL ATRIAL FIBRILLATION (H): Primary | ICD-10-CM

## 2022-05-05 PROCEDURE — 99214 OFFICE O/P EST MOD 30 MIN: CPT | Performed by: INTERNAL MEDICINE

## 2022-05-05 RX ORDER — METOPROLOL SUCCINATE 25 MG/1
25 TABLET, EXTENDED RELEASE ORAL DAILY
Qty: 90 TABLET | Refills: 3 | Status: SHIPPED | OUTPATIENT
Start: 2022-05-05 | End: 2023-04-14
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
## 2022-05-05 NOTE — PATIENT INSTRUCTIONS
Echocardiogram 21 February 2022:  Normal left ventricular size and systolic performance with ejection fraction of 60 to 65%.  No significant valvular heart disease.  Borderline right ventricular enlargement with normal right ventricular systolic performance.  Mild left atrial enlargement.  Borderline right atrial enlargement.    CT coronary angiogram 6 April 2022:  Left main coronary artery: Normal.  Left anterior descending coronary artery: Mid 25-49% stenosis.  Ramus intermedius.  Large vessel and normal.  Circumflex coronary artery: Minimal luminal irregularities.  Right coronary artery: Moderate stenosis of 50-69%.  Mild left atrial enlargement.  Borderline right atrial enlargement.

## 2022-05-08 ENCOUNTER — HEALTH MAINTENANCE LETTER (OUTPATIENT)
Age: 67
End: 2022-05-08

## 2022-05-09 ENCOUNTER — TELEPHONE (OUTPATIENT)
Dept: CARDIOLOGY | Facility: CLINIC | Age: 67
End: 2022-05-09
Payer: MEDICARE

## 2022-05-09 NOTE — TELEPHONE ENCOUNTER
Health Call Center    Phone Message    May a detailed message be left on voicemail: yes     Reason for Call: Other: Per pt [facility] informed pt to make new EP appt with EP MD Dr. Mercado/Gael pt took only Rogelio ghosh. But pt has also seen Anita Karey at the Afib clinic as well unsure if this is a duplicate visit pt unsure if he needs a second appt with Anita now that he has appt with Dr. Mercado in July please call back to verify and confirm appt.     Action Taken: Cardiology     Travel Screening: Not Applicable

## 2022-05-10 NOTE — TELEPHONE ENCOUNTER
Return call from patient, explained he should see Dr. Mercado at this time and not Anita due to discussion of PVI.  Pt states understanding, answered all questions.

## 2022-05-11 ENCOUNTER — TELEPHONE (OUTPATIENT)
Dept: CARDIOLOGY | Facility: CLINIC | Age: 67
End: 2022-05-11
Payer: MEDICARE

## 2022-05-11 NOTE — TELEPHONE ENCOUNTER
[facility] Call Center    Phone Message    May a detailed message be left on voicemail: yes     Reason for Call: Other: Lamine Caldwell's call.  Please call him back at your earliest convenience.  Thank you!     Action Taken: Message routed to:  Other: Cardiology    Travel Screening: Not Applicable

## 2022-05-11 NOTE — TELEPHONE ENCOUNTER
[facility] Call Center    Phone Message    May a detailed message be left on voicemail: yes     Reason for Call: Medication Question or concern regarding medication   Prescription Clarification  Name of Medication: metoprolol succinate ER (TOPROL XL) 25 MG 24 hr tablet  Prescribing Provider: Dr Lundy   Pharmacy:        What on the order needs clarification?      Lamine is calling to clarify if he can take both the losartan (COZAAR) 50 MG tablet and metoprolol succinate ER (TOPROL XL) 25 MG 24 hr tablet in the morning or if he should stagger them and take the metoprolol succinate in the evening.        Action Taken: Other: Cardiology    Travel Screening: Not Applicable

## 2022-05-11 NOTE — TELEPHONE ENCOUNTER
Left msg for patient requesting call back to address questions/concerns - noted EP consult sched on 7-8-22 - no follow-up sched or pending with Dr. Lundy.  mg

## 2022-05-12 NOTE — TELEPHONE ENCOUNTER
Reached out to patient to discuss his medication questions.  BP running 115/75  HR 58.  Patient has been taking the medications together and will continue taking losartan and metoprolol succinate together in the morning. Patient will continue monitoring blood pressure and HR and call prior to EP consult 7/8 with questions or concerns.

## 2022-07-08 ENCOUNTER — OFFICE VISIT (OUTPATIENT)
Dept: CARDIOLOGY | Facility: CLINIC | Age: 67
End: 2022-07-08
Payer: MEDICARE

## 2022-07-08 VITALS
BODY MASS INDEX: 28.17 KG/M2 | HEART RATE: 54 BPM | DIASTOLIC BLOOD PRESSURE: 82 MMHG | WEIGHT: 208 LBS | SYSTOLIC BLOOD PRESSURE: 134 MMHG | RESPIRATION RATE: 16 BRPM | HEIGHT: 72 IN

## 2022-07-08 DIAGNOSIS — I48.0 PAROXYSMAL ATRIAL FIBRILLATION (H): Primary | ICD-10-CM

## 2022-07-08 DIAGNOSIS — I48.19 PERSISTENT ATRIAL FIBRILLATION (H): ICD-10-CM

## 2022-07-08 PROCEDURE — 99245 OFF/OP CONSLTJ NEW/EST HI 55: CPT | Performed by: INTERNAL MEDICINE

## 2022-07-08 RX ORDER — METOPROLOL TARTRATE 50 MG
50 TABLET ORAL PRN
COMMUNITY
End: 2023-09-18
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
## 2022-07-08 NOTE — PATIENT INSTRUCTIONS
[facility]  Cardiac Electrophysiology  [address]  [place] [address]   Office: [phone]  Fax: [phone]       Thank you for seeing us in clinic today - it is a pleasure to be a part of your care team.  Below is a summary of our plan from today's visit.       You have paroxysmal atrial fibrillation, presently being managed with metoprolol XL and Xarelto.  We reviewed physiology and management options including antiarrhythmic drug therapy, catheter ablation.  We will plan for the following:  - consider options further, and let us know with any questions or decision as to how you would like to proceed  - continue metoprolol XL  - continue Xarelto     Please do not hesitate to be in touch with our office at [phone] with any questions that may arise.       Thank you for trusting us with your care,    Major Mercado MD  Clinical Cardiac Electrophysiology  [facility]  [address]  [place] [address]   Office: [phone]  Fax: [phone]              ATRIAL FIBRILLATION: Patient Information    What is atrial fibrillation?  Atrial fibrillation (AF, A-fib) is a common heart rhythm problem (arrhythmia) occurring within the upper chambers of the heart (the atria).  In normal rhythm, the upper and lower chambers of the heart are electrically driven to contract in a coordinated sequence.  In atrial fibrillation, the atria lose their ability to contract because of rapid and chaotic electrical activity.  The lower chambers of the heart (the ventricles) continue to pump blood throughout the body, though with irregular and often faster rate due to the chaotic activity within the atria.        How do I know if I have atrial fibrillation?   Some people may feel their heart beating faster, harder, or irregularly while in atrial fibrillation.  Others may be lightheaded, fatigued, feel weak or tired or become more short of breath  especially with activities.  Some patients have no symptoms at all.  Atrial fibrillation may be found due to an irregular pulse or on an electrocardiogram (ECG). Atrial fibrillation can start and stop on its own, and episodes can last from seconds to several months.      How common is atrial fibrillation?   An estimated 3-6 million people in the [place] have atrial fibrillation.  Atrial fibrillation is a common heart rhythm problem for older persons, affecting as estimated 12-15% of people over the age of 65 years of age.    What causes atrial fibrillation?   Age is the most important risk factor for atrial fibrillation.  Atrial fibrillation is more common in people with other heart disease, high blood pressure, diabetes, obesity, sleep apnea and in people who regularly consume alcohol.  Surgery, lung disease, or thyroid problems can lead to atrial fibrillation.  Atrial fibrillation has multiple possible causes, and in most cases a single cause cannot be found.  Atrial fibrillation is a progressive condition, usually starting with at an early stage with short and infrequent episodes.  In later stages of disease, more frequent and longer lasting episodes of atrial fibrillation occur, ultimately culminating in episodes which do not spontaneously terminate.  Generally, more enlargement and scarring within the upper chambers of the heart is observed as atrial fibrillation progresses from early to late-stage disease.    How is atrial fibrillation diagnosed and evaluated?    Because of its start-stop nature, atrial fibrillation can be challenging to diagnose.  Atrial fibrillation is most commonly diagnosed via cardiac rhythm recordings - either an ECG or wearable cardiac rhythm monitor.  For patients with pacemakers, defibrillators or implantable loop recorders, atrial fibrillation may be recorded via these devices.  Recently, commercially available devices (eg. Apple Watch, Clinicbook device, certain FitBit devices,  others) can allow patients to take 30 second cardiac rhythm recordings which may document atrial fibrillation.  Once atrial fibrillation is diagnosed, additional tests include blood tests and an echocardiogram.  The echocardiogram uses ultrasound to look at your heart to assess your cardiac function and evaluate for other heart disease.  Additional evaluation may include CT or MRI studies.    Is atrial fibrillation dangerous?   Atrial fibrillation is not usually a life-threatening arrhythmia.  The most serious consequences of atrial fibrillation including stroke and worsening of overall cardiac function.  While in atrial fibrillation, the upper cardiac chambers do not contract normally, resulting in slower blood flow and increased risk of clot formation.  If this blood clot becomes detached from the heart a stroke can occur.  Unfortunately, stroke can be the first sign of atrial fibrillation for some people.  With a stroke, you may notice abnormal sensation, weakness on one side of the body or face, changes in your vision or speech.  If you have any of these signs, you should contact EMS and be evaluated in an emergency room as soon as possible.      How is atrial fibrillation treated?     Several treatment options exist for suppressing atrial fibrillation - however, it is not an easily curable arrhythmia.  The first goal in managing atrial fibrillation is to minimize stroke risk.  The second goal is to improve symptoms associated with atrial fibrillation.  Finally, in patients with reduced cardiac function, maintaining normal rhythm can help improve cardiac function.      Blood thinners are used to reduce the risk of stroke in patients with high estimated stroke risk related to atrial fibrillation.  For patients at higher risk of bleeding related to blood thinner use, implantable devices may be an option to reduce stroke risk without the need for long term blood thinner use.      Atrial fibrillation can be managed  via two strategies: rate control and rhythm control.  In a rate control strategy, continued atrial fibrillation is accepted and medications (eg. beta-blockers or calcium channel blockers) are used to control the lower chamber rate.  In a rhythm control strategy, anti-arrhythmic medications or catheter ablation are used to maintain normal cardiac rhythm and slow disease progression by suppressing atrial fibrillation.  A procedure called a cardioversion, in which an electric shock is delivered through patches placed on the chest wall while under deep sedation, can be performed to temporarily restore normal cardiac rhythm, though does not address the chance of atrial fibrillation recurrence.  Treatments are more effective for earlier rather than later stage atrial fibrillation.  Lifestyle modifications (maintaining a healthy weight, aerobic exercise, diagnosing and treating sleep apnea, and minimizing alcohol intake) are important elements of atrial fibrillation rhythm control.     What is catheter ablation for atrial fibrillation?  Cardiac catheter ablation is a commonly performed, minimally invasive procedure performed by a cardiac electrophysiologist to treat many different cardiac rhythm abnormalities.  During catheter ablation, long, thin, flexible tubes are advanced into the heart via small sheaths inserted into the femoral veins and thermal energy (either heating or cooling) is applied within the heart to disrupt abnormal electrical activity.  Atrial fibrillation ablation is performed under general anesthesia, with procedures generally taking approximately 2-3 hours.  Patients are typically observed for 3-5 hours after the ablation, and in most cases can be discharged home the same day.  Atrial fibrillation ablation is associated with better outcomes (mortality, cardiovascular hospitalizations, atrial arrhythmia recurrences) compared to antiarrhythmic drug therapy.  However, atrial fibrillation recurrences are  not uncommon, and repeat catheter ablation may be offered.  Your electrophysiology team can review atrial fibrillation ablation, anticipated success rates, risks, and recovery expectations with you.    What are anti-arrhythmic medications?  Anti-arrhythmic medications are specialized drugs which alter cardiac electrical functioning to suppress arrhythmias.  There are several anti-arrhythmic medications available, each with its own success rate and side effects.  Some anti-arrhythmic medications are less effective though safer to use, others are more effective though have serious potential toxicities.  Atrial fibrillation recurrences are common and may require dose adjustment or change in antiarrhythmic therapy.  Your electrophysiology team will carefully consider which medication would be the best and safest for your particular case.      Can I live a normal life?    The goal of atrial fibrillation management is for patients to live normal lives without being limited by symptoms related to atrial fibrillation.    Are any additional educational resources available?  There are a number of excellent atrial fibrillation education resources available to you online.  A few options you may wish to review include:  hrsonline.org/guide-atrial-fibrillation  afibmatters.org  getsmartaboutafib.com  stopaf.com    What comes next?    Consider your management options and let us know how we can help in your decision process.  Please take medications as they have been prescribed.  You should also get any tests that may have been ordered for you.      When to Call Your Doctor or seek emergency care:  Call your doctor or seek emergency care if you have any significant changes with the following:  Weakness  Dizziness  Fainting  Fatigue  Shortness of breath  Chest pain with increased activity  If you are concerned that your heart rate is too fast or too slow  Bleeding that does not stop in 10 minutes  Coughing or throwing up  blood  Bloody diarrhea or bleeding hemorrhoids  Dark-colored urine or black stool  Allergic reactions:  Rash  Itching  Swelling  Trouble breathing or swallowing      Please call the [facility] at [phone] if you have concerns about your symptoms, your medicines, or your follow-up appointments.
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
## 2022-07-11 ENCOUNTER — MYC MEDICAL ADVICE (OUTPATIENT)
Dept: CARDIOLOGY | Facility: CLINIC | Age: 67
End: 2022-07-11

## 2022-07-13 NOTE — TELEPHONE ENCOUNTER
Response noted. [employer] message sent.  -------------------------  José Lundy MD  You 11 minutes ago (7:59 AM)     FR    Please see note from Lamine below.  I would advocate holding the rivaroxaban for 3 days prior to proposed surgery, but recommend resumption afterward if felt reasonable by his proceduralist/surgeon.  Thanks.

## 2022-07-28 ENCOUNTER — TELEPHONE (OUTPATIENT)
Dept: CARDIOLOGY | Facility: CLINIC | Age: 67
End: 2022-07-28

## 2022-07-28 NOTE — TELEPHONE ENCOUNTER
[facility] Call Center    Phone Message    May a detailed message be left on voicemail: yes     Reason for Call: Other: pt needs direction on what he should do regarding the continual bleeding over a week after the dental surgery.  PLease call to advise     Action Taken: Message routed to:  [facility] cardio    Travel Screening: Not Applicable

## 2022-07-28 NOTE — TELEPHONE ENCOUNTER
Pt held Xarelto for 3 days prior to oral surgery, and for 3 days after oral surgery because they put him on ibuprofen.  Pt still having bleeding - has restarted twice, is not currently bleeding.    Wife is asking if pt should continue Xarelto.  Informed wife that Dr Lundy is currently out of office, but would be made aware.  Wife said mainly she just wanted him to know that pt was off of Xarelto for 7 days, and still had bleeding.    Discussed pros and cons of staying on Xarelto - risk of stroke if pt is in a-fib, or continued possible bleeding.  Discussed that some procedures are done while pts continue the medications, and bleeding is not uncommon after a surgical procedure.    Dr Lundy - any additional recommendations?  -breanne

## 2022-07-29 ENCOUNTER — TELEPHONE (OUTPATIENT)
Dept: CARDIOLOGY | Facility: CLINIC | Age: 67
End: 2022-07-29

## 2022-07-29 NOTE — TELEPHONE ENCOUNTER
Patient called. now 6 days out from his gum surgery, after resuming Xarelto developing gum bleeding today.  Has had no episodes of atrial fibrillation that were symptomatic since 1/2022.  Advised to check his pulse daily looking for asymptomatic recurrences but in the absence to hold Xarelto an additional 4 days.    Camilo Mcgill MD

## 2022-08-03 NOTE — TELEPHONE ENCOUNTER
[facility] to call [phone] to discuss.  -ra    ===View-only below this line===  ----- Message -----  From: José Lundy MD  Sent: 8/3/2022   7:04 AM CDT  To: Maria Elena Stapleton RN    Sounds as though bleeding has now stopped.  Would advocate resumption of full anticoagulation if Lamine is willing.  Thanks.

## 2022-08-03 NOTE — TELEPHONE ENCOUNTER
[facility] Call Center    Phone Message    May a detailed message be left on voicemail: yes     Reason for Call: Other: Lamine missed a call from Dr Mcgill's office regarding the recommendation for him to stop his XARELTO ANTICOAGULANT 20 MG TABS tablet medication for 4 days due to his gum bleeding. He stated he went off the medication for 5 days and will start back on the xarelto tonight. He is looking to reach back out to Dr Mcgill or someone on his care team to further discuss that. Please give Lamine a call back. Thank you!    Action Taken: Other: Cardiology    Travel Screening: Not Applicable

## 2022-08-04 NOTE — TELEPHONE ENCOUNTER
Patient restarted Xarelto last evening after stopping for 4 days. His bleeding stopped yesterday and has not encountered any further bleeding. Encouraged patient to call with any further questions or concerns.

## 2022-09-20 ENCOUNTER — NURSE TRIAGE (OUTPATIENT)
Dept: CARDIOLOGY | Facility: CLINIC | Age: 67
End: 2022-09-20

## 2022-09-20 DIAGNOSIS — I48.0 PAROXYSMAL ATRIAL FIBRILLATION (H): Primary | ICD-10-CM

## 2022-09-20 DIAGNOSIS — I48.0 PAROXYSMAL ATRIAL FIBRILLATION WITH RVR (H): ICD-10-CM

## 2022-09-20 NOTE — TELEPHONE ENCOUNTER
Lamine called because he said about 30 minutes before he called he felt his heart start to beat fast and irregular. He checked with his KardioMobile device and it showed a heart rate of 145-150 and A-fib.He took a Metoprolol Tartrate but he said it hasn't done anything. He checked his KardioMobile again while we were talking and it was still the same. He said the directions for the Metoprolol Tartrate say he can take a second one after an hour but since he has had to be cardioverted twice and the first one didn't work that he may as well go to  I told him he can certainly do that. He said [facility] so he is going to go back there. His wife will drive and he will bring the Metoprolol Tartrate with him if they want him to take the second one. I told him I will send a message to his Cardiologist's nurse and ask them to call him tomorrow.   Reason for Disposition    Heart beating very rapidly (e.g., > 140 / minute) and present now  (Exception: During exercise.)    Additional Information    Negative: Passed out (i.e., lost consciousness, collapsed and was not responding)    Negative: Shock suspected (e.g., cold/pale/clammy skin, too weak to stand, low BP, rapid pulse)    Negative: Difficult to awaken or acting confused (e.g., disoriented, slurred speech)    Negative: Visible sweat on face or sweat dripping down face    Negative: Unable to walk, or can only walk with assistance (e.g., requires support)    Negative: Received SHOCK from implantable cardiac defibrillator and has persisting symptoms (i.e., palpitations, lightheadedness)    Negative: Dizziness, lightheadedness, or weakness and heart beating very rapidly (e.g., > 140 / minute)    Negative: Dizziness, lightheadedness, or weakness and heart beating very slowly (e.g., < 50 / minute)    Negative: Sounds like a life-threatening emergency to the triager    Negative: Chest pain    Negative: Difficulty breathing    Negative:  "Dizziness, lightheadedness, or weakness    Negative: Heart beating very slowly (e.g., < 50 / minute)  (Exception: Athlete and heart rate normal for caller.)    Negative: New or worsened shortness of breath with activity (dyspnea on exertion)    Negative: Patient sounds very sick or weak to the triager    Negative: Wearing a 'Holter monitor' or 'cardiac event monitor'    Negative: Received SHOCK from implantable cardiac defibrillator (and now feels well)    Answer Assessment - Initial Assessment Questions  1. DESCRIPTION: \"Please describe your heart rate or heartbeat that you are having\" (e.g., fast/slow, regular/irregular, skipped or extra beats, \"palpitations\")      Fast and irregular  2. ONSET: \"When did it start?\" (Minutes, hours or days)      About 45 minutes ago  3. DURATION: \"How long does it last\" (e.g., seconds, minutes, hours)     minutes  4. PATTERN \"Does it come and go, or has it been constant since it started?\"  \"Does it get worse with exertion?\"   \"Are you feeling it now?\"     Constant  6. HEART RATE: \"Can you tell me your heart rate?\" \"How many beats in 15 seconds?\"  (Note: not all patients can do this)        [facility]150  7. RECURRENT SYMPTOM: \"Have you ever had this before?\" If Yes, ask: \"When was the last time?\" and \"What happened that time?\"      Has been cardioverted twice  8. CAUSE: \"What do you think is causing the palpitations?\"      Doesn't know  9. CARDIAC HISTORY: \"Do you have any history of heart disease?\" (e.g., heart attack, angina, bypass surgery, angioplasty, arrhythmia)      arrhythmias  10. OTHER SYMPTOMS: \"Do you have any other symptoms?\" (e.g., dizziness, chest pain, sweating, difficulty breathing)       no    Protocols used: HEART RATE AND HEARTBEAT [id number]    "

## 2022-09-21 NOTE — TELEPHONE ENCOUNTER
Dr. Lundy,  Please see [facility] encounter 9/20/2022.  EP follow up to discuss further treatment options?  Thank you,  Asha

## 2022-09-23 NOTE — TELEPHONE ENCOUNTER
Noted. Follow up order placed and message sent to scheduling to arrange.  -------------------------------  José Lundy MD  You 5 minutes ago (7:02 AM)     FR    Yes, lets make arrangements for Lamine to be seen in the [facility].  Thanks!!

## 2022-10-06 ENCOUNTER — OFFICE VISIT (OUTPATIENT)
Dept: CARDIOLOGY | Facility: CLINIC | Age: 67
End: 2022-10-06
Payer: MEDICARE

## 2022-10-06 VITALS
DIASTOLIC BLOOD PRESSURE: 70 MMHG | OXYGEN SATURATION: 97 % | BODY MASS INDEX: 27.32 KG/M2 | HEIGHT: 72 IN | WEIGHT: 201.7 LBS | SYSTOLIC BLOOD PRESSURE: 132 MMHG | RESPIRATION RATE: 16 BRPM | HEART RATE: 55 BPM

## 2022-10-06 DIAGNOSIS — I10 HYPERTENSION, UNSPECIFIED TYPE: ICD-10-CM

## 2022-10-06 DIAGNOSIS — I48.19 PERSISTENT ATRIAL FIBRILLATION (H): Primary | ICD-10-CM

## 2022-10-06 DIAGNOSIS — E78.5 DYSLIPIDEMIA: ICD-10-CM

## 2022-10-06 DIAGNOSIS — I25.10 CORONARY ARTERY DISEASE INVOLVING NATIVE CORONARY ARTERY WITHOUT ANGINA PECTORIS, UNSPECIFIED WHETHER NATIVE OR TRANSPLANTED HEART: ICD-10-CM

## 2022-10-06 PROCEDURE — 99214 OFFICE O/P EST MOD 30 MIN: CPT | Performed by: INTERNAL MEDICINE

## 2022-10-06 RX ORDER — CHLORHEXIDINE GLUCONATE ORAL RINSE 1.2 MG/ML
SOLUTION DENTAL
COMMUNITY
Start: 2022-08-26 | End: 2022-10-10

## 2022-10-06 RX ORDER — ATORVASTATIN CALCIUM 20 MG/1
TABLET, FILM COATED ORAL
COMMUNITY
Start: 2022-01-24 | End: 2022-10-10 | Stop reason: DRUGHIGH

## 2022-10-06 RX ORDER — AZELAIC ACID 0.15 G/G
1 GEL TOPICAL PRN
COMMUNITY
Start: 2022-08-30

## 2022-10-06 NOTE — LETTER
"10/6/2022    Venu Dugan MD  [address] [facility] [address]    RE: Benjamin Baileyanika       Dear Colleague,     I had the pleasure of seeing Benjamin Hooper in the [facility].         [facility]   [address], [place] [address]   [email or web address]   OFFICE: [phone]          Thank you Venu Bauer for asking the [facility] team to participate in the care of your patient, Benjamin Hooper.     Impression and Plan     1.  Atrial fibrillation (paroxysmal).  As noted below, Lamine has newly diagnosed paroxysmal atrial fibrillation. Specifically, Lamine had presented 24 January 2022 with subjective palpitations. He underwent direct-current cardioversion.       Lamine he had a recurrent episode of atrial fibrillation for which he under went direct-current cardioversion a second time on 6 March 2022.    More recently, he had a recurrent episode the third week in September as described below.     Lamine's [id number] Score is 2 yielding an annual embolic risk of 2.2-2.9%. He was started on rivaroxaban for CVA prophylaxis.      Discussed possible antiarrhythmic options as well though may be somewhat limited due to he has heart rate.  Indeed, increase in his beta-blocker from his current low dose would be somewhat problematic.    Lamine did meet with Dr. Derrek Mercado 8 July 2022 to discuss possible atrial fibrillation ablation/PVI.  At that time, it was left somewhat \"open-ended\" and Lamine was instructed by Dr. Mercado to call if he ultimately would like to pursue.  I personally feel he would be a good candidate for PVI given he has now had another recurrent episode and as aforementioned medical therapy may be somewhat limited.  He seems more interested in pursuing this though still wants to think about it over the next few days and plans on giving us a call with his final decision.  Plan:     Continue metoprolol " succinate at 25 mg daily.  I asked that he continue to monitor his pulse which he does on a regular basis as part of his home blood pressure assessments.    Will await Lamine's decision about possible PVI any plans to contact us either through [employer] or by telephone in the next few days.      2.  Coronary artery disease.  Lamine has coronary artery disease by virtue of CT coronary angiography 6 April 2022 revealing mild-moderate obstructive disease (see Cardiac Diagnostic section below).    3.  Hypertension.   Blood pressure is fairly reasonable in the office today.  Blood pressure is somewhat elevated in the office today though this is an aberration for Lamine.       4.  Dyslipidemia.  Lipid profile 22 October 2021 revealed LDL 89 mg/dL and HDL 48 mg/dL.  In response, he has a atorvastatin was increased from 20 mg daily to 40 mg daily    Continue statin therapy.    Plan to obtain a fasting lipid profile in approximately 1 month.    Ultimately, follow-up pending his decision about PVI-ablation.      40  minutes spent reviewing prior records (including documentation, laboratory studies, cardiac testing/imaging), interview with patient along with physical exam, planning, and subsequent documentation/crafting of note).           History of Present Illness    Once again I would like to thank you again for asking me to participate in the care of your patient, Benjamin Hooper.  As you know, but to reiterate for my own records, Benjamin Hooper is a 67 year old male with atrial fibrillation. Specifically, Lamine had presented 24 January 2022 with subjective palpitations.  He was found to have evidence of atrial fibrillation with rapid ventricular response.  He underwent direct-current cardioversion.       Since my last visit with Lamine, he had a recurrent episode of atrial fibrillation for which he underwent direct-current cardioversion a second time on 6 March 2022.     Lamine feels that on both occasions there is seem to  "be somewhat of a \"common denominator\".  Specifically, he states that both had been preceded by some exercise within the 24 hours prior to onset and also small amount of red wine.  He feels as though he may have been dehydrated on both occasions.  He has since cut out essentially all caffeine.  He also has avoided alcohol consumption.  He also has curtailed his exercise given the aforementioned as well out of concern of recurrent atrial fibrillation.    Despite this, however, he did have a recurrent episode third week in September 2022.  He had presented to urgent care and was confirmed to have atrial fibrillation.  He was given 2 doses of diltiazem which slowed his heart rate though he did not convert while at urgent care.  He was dismissed to home and spontaneously converted a few hours after he had been at home.  He is keenly aware of the rhythm disturbance when it is present.    Further review of systems is otherwise negative/noncontributory (medical record and 13 point review of systems reviewed as well and pertinent positives noted).         Cardiac Diagnostics         Echocardiogram 21 February 2022:  1. Normal left ventricular size and systolic performance with ejection fraction of 60 to 65%.  2. No significant valvular heart disease.  3. Borderline right ventricular enlargement with normal right ventricular systolic performance.  4. Mild left atrial enlargement.  Borderline right atrial enlargement.    CT coronary angiogram 6 April 2022:  1. Left main coronary artery: Normal.  2. Left anterior descending coronary artery: Mid 25-49% stenosis.  3. Ramus intermedius.  Large vessel and normal.  4. Circumflex coronary artery: Minimal luminal irregularities.  5. Right coronary artery: Moderate stenosis of 50-69%.  6. Mild left atrial enlargement.  Borderline right atrial enlargement.            Physical Examination       /70 (BP Location: Left arm, Patient Position: Sitting, Cuff Size: Adult Regular)   " Pulse 55   Resp 16   Ht 1.829 m (6')   Wt 91.5 kg (201 lb 11.2 oz)   SpO2 97%   BMI 27.36 kg/m          Wt Readings from Last 3 Encounters:   10/06/22 91.5 kg (201 lb 11.2 oz)   07/08/22 94.3 kg (208 lb)   05/05/22 96 kg (211 lb 9.6 oz)       The patient is alert and oriented times three. Sclerae are anicteric. Mucosal membranes are moist. Jugular venous pressure is normal. No significant adenopathy/thyromegally appreciated. Lungs are clear with good expansion. On cardiovascular exam, the patient has a regular S1 and S2. Abdomen is soft and non-tender. Extremities reveal no clubbing, cyanosis, or edema.         Medications  Allergies   Current Outpatient Medications   Medication Sig Dispense Refill     Ascorbic Acid (VITAMIN C) 500 MG CHEW Take one tab daily       atorvastatin (LIPITOR) 20 MG tablet        atorvastatin (LIPITOR) 40 MG tablet Take 40 mg by mouth daily       azelaic acid (FINACIA) 15 % external gel Apply 1 Application topically       azelaic acid (FINACIA) 15 % external gel Apply 1 inch topically daily as needed        chlorhexidine (PERIDEX) 0.12 % solution RINSE 1/2 OZ IN MOUTH 2-3 TIMES A DAY FOR 30 SECONDS. SPIT OUT AFTERWARDS       diclofenac (VOLTAREN) 1 % topical gel Apply 2 g topically daily as needed prn       losartan (COZAAR) 50 MG tablet Take 50 mg by mouth daily        metoprolol succinate ER (TOPROL XL) 25 MG 24 hr tablet Take 1 tablet (25 mg) by mouth daily 90 tablet 3     metoprolol tartrate (LOPRESSOR) 50 MG tablet Take 50 mg by mouth as needed (At the onset of A-fib and heart rate over 120 bpm.)       multivitamin (CENTRUM SILVER) tablet Take 1 tablet by mouth daily       tamsulosin (FLOMAX) 0.4 MG capsule Take 0.4 mg by mouth daily        UNABLE TO FIND Apply 1 each to eye Administer 1 each into both eyes as needed. Med Name: Thera Tears       XARELTO ANTICOAGULANT 20 MG TABS tablet Take 1 tablet (20 mg) by mouth daily (with dinner) 90 tablet 3       Allergies   Allergen  Reactions     Mupirocin Hives     Hives, rash and itching when mupirocin used in nares while taking oral doxycycline (no previous reaction when using mupirocin alone or doxycycline alone)     Cats Difficulty breathing and Itching     Sestamibi [Technetium-99m] Hives     3/15/2022:  Pt developed hives on torso and limbs approximately 10 minutes after resting nuclear tracer given.  Pt has no complaints of shortness of breath or airway problems.  Did not proceed with the rest of the stress test.       Gramineae Pollens Itching     cough  cough  cough          Lab Results    Chemistry/lipid CBC Cardiac Enzymes/BNP/TSH/INR   Recent Labs   Lab Test 02/01/18  1343   CHOL 173   HDL 50      TRIG 91     Recent Labs   Lab Test 02/01/18  1343        Recent Labs   Lab Test 03/06/22  0012      POTASSIUM 4.0   CHLORIDE 106   CO2 22   *   BUN 14   CR 0.79   GFRESTIMATED >90   NORAH 9.6     Recent Labs   Lab Test 03/06/22  0012 12/17/17  1745   CR 0.79 0.87     No results for input(s): A1C in the last 11728 hours.       Recent Labs   Lab Test 03/06/22  0012   WBC 6.7   HGB 17.2   HCT 50.3   MCV 86        Recent Labs   Lab Test 03/06/22  0012 12/17/17  1745   HGB 17.2 16.9    Recent Labs   Lab Test 03/06/22  0012   TROPONINI <0.01     No results for input(s): BNP, NTBNPI, NTBNP in the last 95500 hours.  Recent Labs   Lab Test 09/27/17  0000   TSH 1.34     Recent Labs   Lab Test 03/06/22  0012   INR 1.88*        Medical History  Surgical History Family History Social History   Past Medical History:   Diagnosis Date     Arthritis 1/1/2015    Base of thumb     Cancer (H) 1/1/2001    AFX skin tumor on chin     Cyclotropia 7/5/2018     Diplopia      H/O magnetic resonance imaging     MRI 10/02/17 with and without contrast (outside imaging) without abnormality     HLD (hyperlipidemia)      Hypertension      Nonsenile cataract      Paroxysmal atrial fibrillation (H) 1/25/2022     Plantar fasciitis 8/28/2020      Trauma     Trauma as child 1-2 years of age, dropped onto the ground. Unknown how fell but developed a black tooth after being dropped.     Past Surgical History:   Procedure Laterality Date     EYE SURGERY      due to double vision 2018     HERNIA REPAIR      2010     NO HISTORY OF SURGERY       Family History   Problem Relation Age of Onset     Hypertension Mother      Cancer Mother         lung      Other Cancer Mother         Lung cancer     Osteoporosis Mother      Hypertension Father      Cancer Father         lung      Other Cancer Father         Lung cancer     Pulmonary Embolism Sister      Breast Cancer Sister      Osteoporosis Sister      Other - See Comments Brother         prostate issue-pt had TURP      Osteopenia Sister      Osteoporosis Sister      Amblyopia No family hx of      Strabismus No family hx of      Colon Cancer No family hx of      Prostate Cancer No family hx of         Social History     Socioeconomic History     Marital status:      Spouse name: Not on file     Number of children: Not on file     Years of education: Not on file     Highest education level: Not on file   Occupational History     Not on file   Tobacco Use     Smoking status: Never Smoker     Smokeless tobacco: Never Used   Substance and Sexual Activity     Alcohol use: Yes     Drug use: Never     Sexual activity: Not Currently     Birth control/protection: None   Other Topics Concern     Parent/sibling w/ CABG, MI or angioplasty before 65F 55M? No   Social History Narrative     Not on file     Social Determinants of Health     Financial Resource Strain: Not on file   Food Insecurity: Not on file   Transportation Needs: Not on file   Physical Activity: Not on file   Stress: Not on file   Social Connections: Not on file   Intimate Partner Violence: Not on file   Housing Stability: Not on file                      Thank you for allowing me to participate in the care of your patient.      Sincerely,      José Lundy MD     [facility]  cc:   José Lundy MD  [address] [facility] [address]  [place] [address]

## 2022-10-06 NOTE — PATIENT INSTRUCTIONS
Call, or send a message via [employer], if you decide to go ahead with the ablation.     Follow-up to be determined base on decision about ablation.

## 2022-10-06 NOTE — PROGRESS NOTES
"       [facility]   [address], [place] [address]   [email or web address]   OFFICE: [phone]          Thank you Venu Bauer for asking the [facility] team to participate in the care of your patient, Benjamin Hooper.     Impression and Plan     1.  Atrial fibrillation (paroxysmal).  As noted below, Lamine has newly diagnosed paroxysmal atrial fibrillation. Specifically, Lamine had presented 24 January 2022 with subjective palpitations. He underwent direct-current cardioversion.       Lamine he had a recurrent episode of atrial fibrillation for which he under went direct-current cardioversion a second time on 6 March 2022.    More recently, he had a recurrent episode the third week in September as described below.     Lamine's [id number] Score is 2 yielding an annual embolic risk of 2.2-2.9%. He was started on rivaroxaban for CVA prophylaxis.      Discussed possible antiarrhythmic options as well though may be somewhat limited due to he has heart rate.  Indeed, increase in his beta-blocker from his current low dose would be somewhat problematic.    Lamine did meet with Dr. Derrek Mercado 8 July 2022 to discuss possible atrial fibrillation ablation/PVI.  At that time, it was left somewhat \"open-ended\" and Lamine was instructed by Dr. Mercado to call if he ultimately would like to pursue.  I personally feel he would be a good candidate for PVI given he has now had another recurrent episode and as aforementioned medical therapy may be somewhat limited.  He seems more interested in pursuing this though still wants to think about it over the next few days and plans on giving us a call with his final decision.  Plan:     Continue metoprolol succinate at 25 mg daily.  I asked that he continue to monitor his pulse which he does on a regular basis as part of his home blood pressure assessments.    Will await Lamine's decision about possible PVI any plans " "to contact us either through [employer] or by telephone in the next few days.      2.  Coronary artery disease.  Lamine has coronary artery disease by virtue of CT coronary angiography 6 April 2022 revealing mild-moderate obstructive disease (see Cardiac Diagnostic section below).    3.  Hypertension.   Blood pressure is fairly reasonable in the office today.  Blood pressure is somewhat elevated in the office today though this is an aberration for Lamine.       4.  Dyslipidemia.  Lipid profile 22 October 2021 revealed LDL 89 mg/dL and HDL 48 mg/dL.  In response, he has a atorvastatin was increased from 20 mg daily to 40 mg daily    Continue statin therapy.    Plan to obtain a fasting lipid profile in approximately 1 month.    Ultimately, follow-up pending his decision about PVI-ablation.      40  minutes spent reviewing prior records (including documentation, laboratory studies, cardiac testing/imaging), interview with patient along with physical exam, planning, and subsequent documentation/crafting of note).           History of Present Illness    Once again I would like to thank you again for asking me to participate in the care of your patient, Benjamin Hooper.  As you know, but to reiterate for my own records, Benjamin Hooper is a 67 year old male with atrial fibrillation. Specifically, Lamine had presented 24 January 2022 with subjective palpitations.  He was found to have evidence of atrial fibrillation with rapid ventricular response.  He underwent direct-current cardioversion.       Since my last visit with Lamine, he had a recurrent episode of atrial fibrillation for which he underwent direct-current cardioversion a second time on 6 March 2022.     Lamine feels that on both occasions there is seem to be somewhat of a \"common denominator\".  Specifically, he states that both had been preceded by some exercise within the 24 hours prior to onset and also small amount of red wine.  He feels as though he may have " been dehydrated on both occasions.  He has since cut out essentially all caffeine.  He also has avoided alcohol consumption.  He also has curtailed his exercise given the aforementioned as well out of concern of recurrent atrial fibrillation.    Despite this, however, he did have a recurrent episode third week in September 2022.  He had presented to urgent care and was confirmed to have atrial fibrillation.  He was given 2 doses of diltiazem which slowed his heart rate though he did not convert while at urgent care.  He was dismissed to home and spontaneously converted a few hours after he had been at home.  He is keenly aware of the rhythm disturbance when it is present.    Further review of systems is otherwise negative/noncontributory (medical record and 13 point review of systems reviewed as well and pertinent positives noted).         Cardiac Diagnostics         Echocardiogram 21 February 2022:  1. Normal left ventricular size and systolic performance with ejection fraction of 60 to 65%.  2. No significant valvular heart disease.  3. Borderline right ventricular enlargement with normal right ventricular systolic performance.  4. Mild left atrial enlargement.  Borderline right atrial enlargement.    CT coronary angiogram 6 April 2022:  1. Left main coronary artery: Normal.  2. Left anterior descending coronary artery: Mid 25-49% stenosis.  3. Ramus intermedius.  Large vessel and normal.  4. Circumflex coronary artery: Minimal luminal irregularities.  5. Right coronary artery: Moderate stenosis of 50-69%.  6. Mild left atrial enlargement.  Borderline right atrial enlargement.            Physical Examination       /70 (BP Location: Left arm, Patient Position: Sitting, Cuff Size: Adult Regular)   Pulse 55   Resp 16   Ht 1.829 m (6')   Wt 91.5 kg (201 lb 11.2 oz)   SpO2 97%   BMI 27.36 kg/m          Wt Readings from Last 3 Encounters:   10/06/22 91.5 kg (201 lb 11.2 oz)   07/08/22 94.3 kg (208 lb)    05/05/22 96 kg (211 lb 9.6 oz)       The patient is alert and oriented times three. Sclerae are anicteric. Mucosal membranes are moist. Jugular venous pressure is normal. No significant adenopathy/thyromegally appreciated. Lungs are clear with good expansion. On cardiovascular exam, the patient has a regular S1 and S2. Abdomen is soft and non-tender. Extremities reveal no clubbing, cyanosis, or edema.         Medications  Allergies   Current Outpatient Medications   Medication Sig Dispense Refill     Ascorbic Acid (VITAMIN C) 500 MG CHEW Take one tab daily       atorvastatin (LIPITOR) 20 MG tablet        atorvastatin (LIPITOR) 40 MG tablet Take 40 mg by mouth daily       azelaic acid (FINACIA) 15 % external gel Apply 1 Application topically       azelaic acid (FINACIA) 15 % external gel Apply 1 inch topically daily as needed        chlorhexidine (PERIDEX) 0.12 % solution RINSE 1/2 OZ IN MOUTH 2-3 TIMES A DAY FOR 30 SECONDS. SPIT OUT AFTERWARDS       diclofenac (VOLTAREN) 1 % topical gel Apply 2 g topically daily as needed prn       losartan (COZAAR) 50 MG tablet Take 50 mg by mouth daily        metoprolol succinate ER (TOPROL XL) 25 MG 24 hr tablet Take 1 tablet (25 mg) by mouth daily 90 tablet 3     metoprolol tartrate (LOPRESSOR) 50 MG tablet Take 50 mg by mouth as needed (At the onset of A-fib and heart rate over 120 bpm.)       multivitamin (CENTRUM SILVER) tablet Take 1 tablet by mouth daily       tamsulosin (FLOMAX) 0.4 MG capsule Take 0.4 mg by mouth daily        UNABLE TO FIND Apply 1 each to eye Administer 1 each into both eyes as needed. Med Name: Thera Tears       XARELTO ANTICOAGULANT 20 MG TABS tablet Take 1 tablet (20 mg) by mouth daily (with dinner) 90 tablet 3       Allergies   Allergen Reactions     Mupirocin Hives     Hives, rash and itching when mupirocin used in nares while taking oral doxycycline (no previous reaction when using mupirocin alone or doxycycline alone)     Cats Difficulty  breathing and Itching     Sestamibi [Technetium-99m] Hives     3/15/2022:  Pt developed hives on torso and limbs approximately 10 minutes after resting nuclear tracer given.  Pt has no complaints of shortness of breath or airway problems.  Did not proceed with the rest of the stress test.       Gramineae Pollens Itching     cough  cough  cough          Lab Results    Chemistry/lipid CBC Cardiac Enzymes/BNP/TSH/INR   Recent Labs   Lab Test 02/01/18  1343   CHOL 173   HDL 50      TRIG 91     Recent Labs   Lab Test 02/01/18  1343        Recent Labs   Lab Test 03/06/22  0012      POTASSIUM 4.0   CHLORIDE 106   CO2 22   *   BUN 14   CR 0.79   GFRESTIMATED >90   NORAH 9.6     Recent Labs   Lab Test 03/06/22  0012 12/17/17  1745   CR 0.79 0.87     No results for input(s): A1C in the last 18688 hours.       Recent Labs   Lab Test 03/06/22  0012   WBC 6.7   HGB 17.2   HCT 50.3   MCV 86        Recent Labs   Lab Test 03/06/22  0012 12/17/17  1745   HGB 17.2 16.9    Recent Labs   Lab Test 03/06/22  0012   TROPONINI <0.01     No results for input(s): BNP, NTBNPI, NTBNP in the last 06880 hours.  Recent Labs   Lab Test 09/27/17  0000   TSH 1.34     Recent Labs   Lab Test 03/06/22  0012   INR 1.88*        Medical History  Surgical History Family History Social History   Past Medical History:   Diagnosis Date     Arthritis 1/1/2015    Base of thumb     Cancer (H) 1/1/2001    AFX skin tumor on chin     Cyclotropia 7/5/2018     Diplopia      H/O magnetic resonance imaging     MRI 10/02/17 with and without contrast (outside imaging) without abnormality     HLD (hyperlipidemia)      Hypertension      Nonsenile cataract      Paroxysmal atrial fibrillation (H) 1/25/2022     Plantar fasciitis 8/28/2020     Trauma     Trauma as child 1-2 years of age, dropped onto the ground. Unknown how fell but developed a black tooth after being dropped.     Past Surgical History:   Procedure Laterality Date     EYE  SURGERY      due to double vision 2018     HERNIA REPAIR      2010     NO HISTORY OF SURGERY       Family History   Problem Relation Age of Onset     Hypertension Mother      Cancer Mother         lung      Other Cancer Mother         Lung cancer     Osteoporosis Mother      Hypertension Father      Cancer Father         lung      Other Cancer Father         Lung cancer     Pulmonary Embolism Sister      Breast Cancer Sister      Osteoporosis Sister      Other - See Comments Brother         prostate issue-pt had TURP      Osteopenia Sister      Osteoporosis Sister      Amblyopia No family hx of      Strabismus No family hx of      Colon Cancer No family hx of      Prostate Cancer No family hx of         Social History     Socioeconomic History     Marital status:      Spouse name: Not on file     Number of children: Not on file     Years of education: Not on file     Highest education level: Not on file   Occupational History     Not on file   Tobacco Use     Smoking status: Never Smoker     Smokeless tobacco: Never Used   Substance and Sexual Activity     Alcohol use: Yes     Drug use: Never     Sexual activity: Not Currently     Birth control/protection: None   Other Topics Concern     Parent/sibling w/ CABG, MI or angioplasty before 65F 55M? No   Social History Narrative     Not on file     Social Determinants of Health     Financial Resource Strain: Not on file   Food Insecurity: Not on file   Transportation Needs: Not on file   Physical Activity: Not on file   Stress: Not on file   Social Connections: Not on file   Intimate Partner Violence: Not on file   Housing Stability: Not on file

## 2022-10-10 ENCOUNTER — OFFICE VISIT (OUTPATIENT)
Dept: FAMILY MEDICINE | Facility: CLINIC | Age: 67
End: 2022-10-10
Payer: MEDICARE

## 2022-10-10 VITALS
HEART RATE: 60 BPM | DIASTOLIC BLOOD PRESSURE: 76 MMHG | WEIGHT: 201.1 LBS | BODY MASS INDEX: 27.27 KG/M2 | SYSTOLIC BLOOD PRESSURE: 128 MMHG

## 2022-10-10 DIAGNOSIS — H00.015 HORDEOLUM EXTERNUM OF LEFT LOWER EYELID: Primary | ICD-10-CM

## 2022-10-10 DIAGNOSIS — Z86.69 H/O STRABISMUS: ICD-10-CM

## 2022-10-10 PROBLEM — M79.671 RIGHT FOOT PAIN: Status: RESOLVED | Noted: 2021-09-03 | Resolved: 2022-10-10

## 2022-10-10 PROCEDURE — 99214 OFFICE O/P EST MOD 30 MIN: CPT | Performed by: FAMILY MEDICINE

## 2022-10-10 NOTE — PROGRESS NOTES
Assessment & Plan     Patient presents with:  Eye Problem: Left eye swelling / bulging on the lower lid - started 10/7/22 - some pain and distortion of eye sight        Benjamin was seen today for eye problem.    Diagnoses and all orders for this visit:    Hordeolum externum of left lower eyelid  -     tobramycin-dexamethasone (TOBRADEX) 0.3-0.1 % ophthalmic ointment; Place 0.5 inches Into the left eye 3 times daily    H/O b/l strabismus surgery 5 yr ago     Patient is new to our practice able to review past medical , surgical history, and meds with the patient  Exam typical finding of stye left lower lid treatment with tobramycin dexamethasone combination to help with the swelling and discomfort  Follow-up in 5 days if no improvement in symptoms also recommend yearly complete eye exam  BMI:   Estimated body mass index is 27.27 kg/m  as calculated from the following:    Height as of 10/6/22: 1.829 m (6').    Weight as of this encounter: 91.2 kg (201 lb 1.6 oz).   Weight management plan: Discussed healthy diet and exercise guidelines        No follow-ups on file.      Subjective   Benjamin Hooper 67 year old who presents for the following health issues     HPI   Answers for HPI/ROS submitted by the patient on 10/10/2022  How many servings of fruits and vegetables do you eat daily?: 4 or more  On average, how many sweetened beverages do you drink each day (Examples: soda, juice, sweet tea, etc.  Do NOT count diet or artificially sweetened beverages)?: 0  How many minutes a day do you exercise enough to make your heart beat faster?: 30 to 60  How many days a week do you exercise enough to make your heart beat faster?: 4  How many days per week do you miss taking your medication?: 0  What is the reason for your visit today?: Left eye - eyelid pain, swelling, redness  When did your symptoms begin?: 3-7 days ago  What are your symptoms?: Pain/swelling/redness slight loss of visual acuity  How would you describe these  symptoms?: Moderate  Are your symptoms:: Staying the same  Have you had these symptoms before?: Yes  Have you tried or received treatment for these symptoms before?: No    Patient also reports he end of August he was treated for seborrheic keratosis liquid nitrogen and had stye symptoms after that which self resolved but this swelling started around October 7, with significant pain and discoloration more towards black and blue eylid .  Patient is on Xarelto and wondering if any bleeding under the eyelid causing the discoloration.  History of strabismus surgery both eyes almost 5 years ago also has concern in case any complication of the surgery.  Denies any double vision, blurred vision or photophobia.  Blood pressure is well controlled    Patient Active Problem List   Diagnosis     Mixed hyperlipidemia     Essential hypertension     Anxiety     Claustrophobia     Elevated ferritin, hemoglobin, and red blood cell count (H)     Gilbert's disease     Lower urinary tract symptoms (LUTS)     Rosacea     Routine general medical examination at a health care facility     Peroneal tendinitis of right lower extremity     Paroxysmal atrial fibrillation (H)     Benign prostatic hyperplasia without urinary obstruction     Atrial flutter (H)        Current Outpatient Medications   Medication     Ascorbic Acid (VITAMIN C) 500 MG CHEW     atorvastatin (LIPITOR) 40 MG tablet     azelaic acid (FINACIA) 15 % external gel     diclofenac (VOLTAREN) 1 % topical gel     losartan (COZAAR) 50 MG tablet     metoprolol succinate ER (TOPROL XL) 25 MG 24 hr tablet     metoprolol tartrate (LOPRESSOR) 50 MG tablet     multivitamin (CENTRUM SILVER) tablet     tamsulosin (FLOMAX) 0.4 MG capsule     tobramycin-dexamethasone (TOBRADEX) 0.3-0.1 % ophthalmic ointment     UNABLE TO FIND     XARELTO ANTICOAGULANT 20 MG TABS tablet     No current facility-administered medications for this visit.          Social Determinants of Health     Tobacco Use: Low  Risk      Smoking Tobacco Use: Never     Smokeless Tobacco Use: Never     Passive Exposure: Not on file   Alcohol Use: Not on file   Financial Resource Strain: Not on file   Food Insecurity: Not on file   Transportation Needs: Not on file   Physical Activity: Not on file   Stress: Not on file   Social Connections: Not on file   Intimate Partner Violence: Not on file   Depression: Not at risk     PHQ-2 Score: 0   Housing Stability: Not on file        Review of Systems   Constitutional, HEENT, cardiovascular, pulmonary, GI, , musculoskeletal, neuro, skin, endocrine and psych systems are negative, except as otherwise noted.      Objective    /76 (BP Location: Left arm, Patient Position: Sitting, Cuff Size: Adult Large)   Pulse 60   Wt 91.2 kg (201 lb 1.6 oz)   BMI 27.27 kg/m     LMP 06/01/2011   There is no height or weight on file to calculate BMI.  Physical Exam   GENERAL: healthy, alert and no distress  EYES: Eyes grossly normal to inspection, visual fields normal and eyelids- periorbital edema left lower lid  and hordeolum/sty left lower lid   NECK: no adenopathy, no asymmetry, masses, or scars and thyroid normal to palpation  RESP: lungs clear to auscultation - no rales, rhonchi or wheezes  CV: regular rate and rhythm, normal S1 S2, no S3 or S4, no murmur, click or rub, no peripheral edema and peripheral pulses strong  MS: no gross musculoskeletal defects noted, no edema    Leatha Garces MD   [facility].  [phone]

## 2023-04-12 ENCOUNTER — APPOINTMENT (OUTPATIENT)
Dept: CT IMAGING | Facility: CLINIC | Age: 68
End: 2023-04-12
Attending: EMERGENCY MEDICINE
Payer: MEDICARE

## 2023-04-12 ENCOUNTER — HOSPITAL ENCOUNTER (EMERGENCY)
Facility: CLINIC | Age: 68
Discharge: HOME | End: 2023-04-12
Attending: EMERGENCY MEDICINE | Admitting: EMERGENCY MEDICINE
Payer: MEDICARE

## 2023-04-12 VITALS
OXYGEN SATURATION: 98 % | DIASTOLIC BLOOD PRESSURE: 85 MMHG | HEART RATE: 76 BPM | SYSTOLIC BLOOD PRESSURE: 152 MMHG | RESPIRATION RATE: 16 BRPM | WEIGHT: 210 LBS | TEMPERATURE: 98 F | HEIGHT: 72 IN | BODY MASS INDEX: 28.44 KG/M2

## 2023-04-12 DIAGNOSIS — G43.109 MIGRAINE WITH AURA AND WITHOUT STATUS MIGRAINOSUS, NOT INTRACTABLE: ICD-10-CM

## 2023-04-12 LAB
ANION GAP SERPL CALCULATED.3IONS-SCNC: 14 MMOL/L (ref 5–18)
ATRIAL RATE - MUSE: 72 BPM
BASOPHILS # BLD AUTO: 0 10E3/UL (ref 0–0.2)
BASOPHILS NFR BLD AUTO: 1 %
BUN SERPL-MCNC: 14 MG/DL (ref 8–22)
CALCIUM SERPL-MCNC: 9.6 MG/DL (ref 8.5–10.5)
CHLORIDE BLD-SCNC: 100 MMOL/L (ref 98–107)
CO2 SERPL-SCNC: 20 MMOL/L (ref 22–31)
CREAT SERPL-MCNC: 0.83 MG/DL (ref 0.7–1.3)
DIASTOLIC BLOOD PRESSURE - MUSE: NORMAL MMHG
EOSINOPHIL # BLD AUTO: 0.1 10E3/UL (ref 0–0.7)
EOSINOPHIL NFR BLD AUTO: 2 %
ERYTHROCYTE [DISTWIDTH] IN BLOOD BY AUTOMATED COUNT: 11.9 % (ref 10–15)
GFR SERPL CREATININE-BSD FRML MDRD: >90 ML/MIN/1.73M2
GLUCOSE BLD-MCNC: 98 MG/DL (ref 70–125)
HCT VFR BLD AUTO: 48.8 % (ref 40–53)
HGB BLD-MCNC: 16.9 G/DL (ref 13.3–17.7)
HOLD SPECIMEN: NORMAL
IMM GRANULOCYTES # BLD: 0.1 10E3/UL
IMM GRANULOCYTES NFR BLD: 1 %
INTERPRETATION ECG - MUSE: NORMAL
LYMPHOCYTES # BLD AUTO: 1.5 10E3/UL (ref 0.8–5.3)
LYMPHOCYTES NFR BLD AUTO: 23 %
MCH RBC QN AUTO: 29.6 PG (ref 26.5–33)
MCHC RBC AUTO-ENTMCNC: 34.6 G/DL (ref 31.5–36.5)
MCV RBC AUTO: 86 FL (ref 78–100)
MONOCYTES # BLD AUTO: 0.6 10E3/UL (ref 0–1.3)
MONOCYTES NFR BLD AUTO: 9 %
NEUTROPHILS # BLD AUTO: 4.2 10E3/UL (ref 1.6–8.3)
NEUTROPHILS NFR BLD AUTO: 64 %
NRBC # BLD AUTO: 0 10E3/UL
NRBC BLD AUTO-RTO: 0 /100
P AXIS - MUSE: 33 DEGREES
PLATELET # BLD AUTO: 190 10E3/UL (ref 150–450)
POTASSIUM BLD-SCNC: 4.2 MMOL/L (ref 3.5–5)
PR INTERVAL - MUSE: 200 MS
QRS DURATION - MUSE: 80 MS
QT - MUSE: 430 MS
QTC - MUSE: 470 MS
R AXIS - MUSE: 39 DEGREES
RBC # BLD AUTO: 5.71 10E6/UL (ref 4.4–5.9)
SODIUM SERPL-SCNC: 134 MMOL/L (ref 136–145)
SYSTOLIC BLOOD PRESSURE - MUSE: NORMAL MMHG
T AXIS - MUSE: 12 DEGREES
VENTRICULAR RATE- MUSE: 72 BPM
WBC # BLD AUTO: 6.4 10E3/UL (ref 4–11)

## 2023-04-12 PROCEDURE — 80048 BASIC METABOLIC PNL TOTAL CA: CPT | Performed by: EMERGENCY MEDICINE

## 2023-04-12 PROCEDURE — 36415 COLL VENOUS BLD VENIPUNCTURE: CPT | Performed by: EMERGENCY MEDICINE

## 2023-04-12 PROCEDURE — 70450 CT HEAD/BRAIN W/O DYE: CPT | Mod: MA

## 2023-04-12 PROCEDURE — 99285 EMERGENCY DEPT VISIT HI MDM: CPT | Mod: 25

## 2023-04-12 PROCEDURE — 93005 ELECTROCARDIOGRAM TRACING: CPT | Performed by: EMERGENCY MEDICINE

## 2023-04-12 PROCEDURE — 85025 COMPLETE CBC W/AUTO DIFF WBC: CPT | Performed by: EMERGENCY MEDICINE

## 2023-04-12 ASSESSMENT — ENCOUNTER SYMPTOMS
DIZZINESS: 0
SHORTNESS OF BREATH: 0
HEADACHES: 1
NUMBNESS: 0
VOMITING: 0

## 2023-04-12 ASSESSMENT — ACTIVITIES OF DAILY LIVING (ADL): ADLS_ACUITY_SCORE: 35
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
## 2023-04-12 NOTE — DISCHARGE INSTRUCTIONS
Laboratory test, EKG, head CT scan results all appear reassuring here today in the ED.  Your symptoms appear consistent with a scintillating scotoma and a migraine headache.      You can use over-the-counter Tylenol ibuprofen as needed for any further headaches.    Follow-up with your primary care provider for reevaluation in 2 days.  Return back to ED sooner for any worsening vision changes, headaches, or any other new or concerning symptoms.

## 2023-04-12 NOTE — ED TRIAGE NOTES
Pt arrives with a headache. Started about an hour and half ago while on the treadmill and started to have shimmering to the right peripheral of his eye. That lasted about 5-10 mins. Shortly after he started have pressure to the right temporal and is now radiated across to the left temporal. No other neuro deficits. Had ablation for afib last week and 3 weeks ago had a mole removed on the top of his head. Adela in triage for a neuro eval.     Triage Assessment     Row Name 04/12/23 1233       Triage Assessment (Adult)    Airway WDL WDL       Respiratory WDL    Respiratory WDL WDL       Skin Circulation/Temperature WDL    Skin Circulation/Temperature WDL WDL       Cardiac WDL    Cardiac WDL WDL       Peripheral/Neurovascular WDL    Peripheral Neurovascular WDL WDL       Cognitive/Neuro/Behavioral WDL    Cognitive/Neuro/Behavioral WDL WDL

## 2023-04-14 ENCOUNTER — OFFICE VISIT (OUTPATIENT)
Dept: FAMILY MEDICINE | Facility: CLINIC | Age: 68
End: 2023-04-14
Payer: MEDICARE

## 2023-04-14 VITALS
BODY MASS INDEX: 28.72 KG/M2 | SYSTOLIC BLOOD PRESSURE: 133 MMHG | WEIGHT: 212.06 LBS | HEIGHT: 72 IN | OXYGEN SATURATION: 98 % | TEMPERATURE: 97.8 F | RESPIRATION RATE: 16 BRPM | DIASTOLIC BLOOD PRESSURE: 81 MMHG | HEART RATE: 70 BPM

## 2023-04-14 DIAGNOSIS — N40.0 BENIGN PROSTATIC HYPERPLASIA WITHOUT URINARY OBSTRUCTION: ICD-10-CM

## 2023-04-14 DIAGNOSIS — C44.92 SCC (SQUAMOUS CELL CARCINOMA): ICD-10-CM

## 2023-04-14 DIAGNOSIS — Z09 HOSPITAL DISCHARGE FOLLOW-UP: Primary | ICD-10-CM

## 2023-04-14 DIAGNOSIS — D58.2 ELEVATED FERRITIN, HEMOGLOBIN, AND RED BLOOD CELL COUNT (H): ICD-10-CM

## 2023-04-14 DIAGNOSIS — R71.8 ELEVATED FERRITIN, HEMOGLOBIN, AND RED BLOOD CELL COUNT (H): ICD-10-CM

## 2023-04-14 DIAGNOSIS — R79.89 ELEVATED FERRITIN, HEMOGLOBIN, AND RED BLOOD CELL COUNT (H): ICD-10-CM

## 2023-04-14 DIAGNOSIS — I10 ESSENTIAL HYPERTENSION: ICD-10-CM

## 2023-04-14 DIAGNOSIS — I48.0 PAROXYSMAL ATRIAL FIBRILLATION (H): ICD-10-CM

## 2023-04-14 PROBLEM — I48.92 ATRIAL FLUTTER (H): Status: RESOLVED | Noted: 2022-04-01 | Resolved: 2023-04-14

## 2023-04-14 PROCEDURE — 99215 OFFICE O/P EST HI 40 MIN: CPT | Performed by: FAMILY MEDICINE

## 2023-04-14 RX ORDER — ATORVASTATIN CALCIUM 40 MG/1
20 TABLET, FILM COATED ORAL DAILY
COMMUNITY
Start: 2023-04-14 | End: 2023-12-04

## 2023-04-14 RX ORDER — OMEPRAZOLE 40 MG/1
40 CAPSULE, DELAYED RELEASE ORAL DAILY
COMMUNITY
Start: 2023-04-04 | End: 2023-05-26

## 2023-04-14 RX ORDER — ATORVASTATIN CALCIUM 40 MG/1
20 TABLET, FILM COATED ORAL DAILY
COMMUNITY
Start: 2023-04-04 | End: 2023-04-14

## 2023-04-14 RX ORDER — COLCHICINE 0.6 MG/1
0.3 TABLET ORAL DAILY
COMMUNITY
Start: 2023-04-04 | End: 2023-05-26

## 2023-04-14 RX ORDER — SUCRALFATE 1 G/1
TABLET ORAL DAILY
COMMUNITY
Start: 2023-04-04 | End: 2023-05-26

## 2023-04-14 RX ORDER — AMIODARONE HYDROCHLORIDE 200 MG/1
200 TABLET ORAL DAILY
COMMUNITY
Start: 2023-04-04 | End: 2023-09-18

## 2023-04-14 ASSESSMENT — ENCOUNTER SYMPTOMS: CONSTITUTIONAL NEGATIVE: 1

## 2023-04-14 NOTE — ASSESSMENT & PLAN NOTE
S/p ablation.  Management per [place] cardiology.  Will transfer management back to [facility].  Continue current medications.  Labs planned for May at [place].

## 2023-04-14 NOTE — PROGRESS NOTES
Assessment & Plan   Problem List Items Addressed This Visit     Elevated ferritin, hemoglobin, and red blood cell count (H)     Recent measurements within normal limits.          Paroxysmal atrial fibrillation (H) - Primary     S/p ablation.  Management per [place] cardiology.  Will transfer management back to [facility].  Continue current medications.  Labs planned for May at [facility].          SCC (squamous cell carcinoma)     Recent Mohs.               Review of prior external note(s) from - CareEverywhere information from [facility] reviewed  45 minutes spent by me on the date of the encounter doing chart review, history and exam, documentation and further activities per the note     MED REC REQUIRED  Post Medication Reconciliation Status:  Discharge medications reconciled, continue medications without change    Venu Dugan MD  [facility] ANDREA Raman is a 67 year old, presenting for the following health issues:  Follow Up ([facility] catheter ablation 04/04/23 procedure./[facility] 04/12/23 Optical migraine.) and Skin Cancer (Procedure 03/24/23 on scalp.)        4/14/2023     2:57 PM   Additional Questions   Roomed by [facility]   Accompanied by wife         4/14/2023     2:57 PM   Patient Reported Additional Medications   Patient reports taking the following new medications yes     HPI     Hospital Follow-up Visit:    Hospital/Nursing Home/IP Rehab Facility: [facility]  Date of Admission: 4/3  Date of Discharge: 4/3  Reason(s) for Admission: atrial fibrillation    Was your hospitalization related to COVID-19? No   Problems taking medications regularly:  None  Medication changes since discharge: None  Problems adhering to non-medication therapy:  None    Summary of hospitalization:  CareEverywhere information obtained and reviewed  Diagnostic Tests/Treatments reviewed.  Follow up needed: Cardiology follow-up. discontinue medications per schedul  Other Healthcare Providers Involved in  "Patient s Care:         Specialist appointment - Dr. Mahoney, [place] cardiology  Update since discharge: improved.       Plan of care communicated with patient and family     Urination: not concerns currently  Itching: resolved. He took allegra.    A fib: he started amiodarone.  \"I don't like it.\"  The patient worries about the side effects of the medications including sun sensitivity.  Plan for three months.  He is still on xarelto  Sleep: fragmented.  Struggling to fall asleep.  He associates this with a medication. Wakes early.      ED - Headache:   - was seen in emergency department 4/12.  Work-up without change.  Visual changes while warming up on a treadmill.  He has some vision changes. [facility] cardiology recommended evaluation. Vision changes resolved after about 5 minutes. He then had right sided headache, \"pressure and numbness.\"  Symptoms better on 4/12.  Yesterday he had a similar phenomena briefly in the left side.  Resolution without headache after about 20 minutes.      Skin: he saw dermatology recently. SCC    Review of Systems   Constitutional: Negative.    All other systems reviewed and are negative.         Objective    /81 (BP Location: Left arm, Patient Position: Sitting, Cuff Size: Adult Large)   Pulse 70   Temp 97.8  F (36.6  C) (Oral)   Resp 16   Ht 1.829 m (6')   Wt 96.2 kg (212 lb 1 oz)   SpO2 98%   BMI 28.76 kg/m    Body mass index is 28.76 kg/m .  Physical Exam  Nursing note reviewed.   Constitutional:       General: He is not in acute distress.     Appearance: Normal appearance. He is not ill-appearing.   HENT:      Head: Normocephalic and atraumatic.   Eyes:      Extraocular Movements: Extraocular movements intact.      Conjunctiva/sclera: Conjunctivae normal.   Pulmonary:      Effort: Pulmonary effort is normal.   Neurological:      Mental Status: He is alert and oriented to person, place, and time.   Psychiatric:         Attention and Perception: Attention normal.         " Mood and Affect: Mood normal.         Speech: Speech normal.         Thought Content: Thought content normal.          Discharge note from [facility] reviewed.  ED note reviewed

## 2023-05-19 ENCOUNTER — LAB (OUTPATIENT)
Dept: LAB | Facility: CLINIC | Age: 68
End: 2023-05-19
Payer: MEDICARE

## 2023-05-19 DIAGNOSIS — I25.10 CORONARY ARTERY DISEASE INVOLVING NATIVE CORONARY ARTERY WITHOUT ANGINA PECTORIS, UNSPECIFIED WHETHER NATIVE OR TRANSPLANTED HEART: ICD-10-CM

## 2023-05-19 LAB
CHOLEST SERPL-MCNC: 152 MG/DL
HDLC SERPL-MCNC: 49 MG/DL
LDLC SERPL CALC-MCNC: 87 MG/DL
NONHDLC SERPL-MCNC: 103 MG/DL
TRIGL SERPL-MCNC: 80 MG/DL

## 2023-05-19 PROCEDURE — 80061 LIPID PANEL: CPT

## 2023-05-19 PROCEDURE — 36415 COLL VENOUS BLD VENIPUNCTURE: CPT

## 2023-05-23 ENCOUNTER — OFFICE VISIT (OUTPATIENT)
Dept: CARDIOLOGY | Facility: CLINIC | Age: 68
End: 2023-05-23
Attending: INTERNAL MEDICINE
Payer: MEDICARE

## 2023-05-23 VITALS
DIASTOLIC BLOOD PRESSURE: 85 MMHG | SYSTOLIC BLOOD PRESSURE: 137 MMHG | OXYGEN SATURATION: 98 % | HEART RATE: 65 BPM | RESPIRATION RATE: 16 BRPM | BODY MASS INDEX: 28.77 KG/M2 | WEIGHT: 212.13 LBS

## 2023-05-23 DIAGNOSIS — I25.10 CORONARY ARTERY DISEASE INVOLVING NATIVE CORONARY ARTERY WITHOUT ANGINA PECTORIS, UNSPECIFIED WHETHER NATIVE OR TRANSPLANTED HEART: ICD-10-CM

## 2023-05-23 DIAGNOSIS — I10 HYPERTENSION, UNSPECIFIED TYPE: ICD-10-CM

## 2023-05-23 DIAGNOSIS — I48.0 PAROXYSMAL ATRIAL FIBRILLATION WITH RVR (H): ICD-10-CM

## 2023-05-23 DIAGNOSIS — I48.0 PAROXYSMAL ATRIAL FIBRILLATION (H): ICD-10-CM

## 2023-05-23 DIAGNOSIS — E78.5 DYSLIPIDEMIA: Primary | ICD-10-CM

## 2023-05-23 PROCEDURE — 99214 OFFICE O/P EST MOD 30 MIN: CPT | Performed by: INTERNAL MEDICINE
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
## 2023-05-26 ENCOUNTER — TELEPHONE (OUTPATIENT)
Dept: FAMILY MEDICINE | Facility: CLINIC | Age: 68
End: 2023-05-26

## 2023-05-26 ENCOUNTER — VIRTUAL VISIT (OUTPATIENT)
Dept: FAMILY MEDICINE | Facility: OTHER | Age: 68
End: 2023-05-26
Payer: MEDICARE

## 2023-05-26 DIAGNOSIS — R50.9 FEVER, UNSPECIFIED FEVER CAUSE: Primary | ICD-10-CM

## 2023-05-26 PROBLEM — R79.89 ELEVATED FERRITIN, HEMOGLOBIN, AND RED BLOOD CELL COUNT (H): Status: RESOLVED | Noted: 2021-02-24 | Resolved: 2023-05-26

## 2023-05-26 PROBLEM — E80.4 GILBERT'S DISEASE: Status: ACTIVE | Noted: 2017-12-21

## 2023-05-26 PROBLEM — Z00.00 ROUTINE GENERAL MEDICAL EXAMINATION AT A HEALTH CARE FACILITY: Status: RESOLVED | Noted: 2021-02-24 | Resolved: 2023-05-26

## 2023-05-26 PROBLEM — D58.2 ELEVATED FERRITIN, HEMOGLOBIN, AND RED BLOOD CELL COUNT (H): Status: RESOLVED | Noted: 2021-02-24 | Resolved: 2023-05-26

## 2023-05-26 PROBLEM — R71.8 ELEVATED FERRITIN, HEMOGLOBIN, AND RED BLOOD CELL COUNT (H): Status: RESOLVED | Noted: 2021-02-24 | Resolved: 2023-05-26

## 2023-05-26 PROCEDURE — 99442 PR PHYSICIAN TELEPHONE EVALUATION 11-20 MIN: CPT | Mod: 95 | Performed by: FAMILY MEDICINE

## 2023-05-26 NOTE — PROGRESS NOTES
Lamine is a 67 year old who is being evaluated via a billable telephone visit.  Internet was not working and so was unable to do video visit.    What phone number would you like to be contacted at? [phone]  How would you like to obtain your AVS? Clari    Distant Location (provider location):  On-site    Assessment & Plan     Fever, unspecified fever cause  He had acute onset of fever and chills today and eventually developed dry cough.  He is concerned as he was around a lot of people and is wondering about COVID.  He has taken 2 to test and has been negative so far.  He is in no respiratory distress and can talk in full paragraphs.  Tylenol has been effective to bring down his fever but as it wears off he can feel the shakes and chills.  He was wondering if he did test positive for COVID if there were any treatments available.  We discussed that because he is on amiodarone Paxlovid is contraindicated.  He is not very high risk to require remdesivir and discussed lesser efficacy of molnupiravir.  We discussed there could be other reasons for his fever including pneumonia.  We discussed that if he becomes short of breath, fever worsens or he overall worsens would recommend he be seen for consideration of chest x-ray and CBC.  He and his wife are able to identify urgent cares and urgency centers near them if they need to go.    Adele Piper MD  [facility]   Lamine is a 67 year old, presenting for the following health issues:  URI (Covid testing)        5/26/2023     3:47 PM   Additional Questions   Roomed by Marla WILSON   Accompanied by self     URI           COVID-19 Symptom Review  How many days ago did these symptoms start? Yesterday around 1 pm - pt started to feel tired and today is when symptoms worsened     Are any of the following symptoms significant for you?    New or worsening difficulty breathing? No    Worsening cough? Yes, it's a dry cough.     Fever or  chills? Yes, the highest temperature was 103    Headache: YES    Sore throat: No - pt says theres discomfort from coughing so much    Chest pain: No    Diarrhea: No    Body aches? YES    What treatments has patient tried? Acetaminophen   Does patient live in a nursing home, group home, or shelter? No  Does patient have a way to get food/medications during quarantined? Yes, I have a friend or family member who can help me.          Has been very careful with Covid precautions until last week when was around other people, although kept his distance.  Today had chills and shakes and temperature went up to 100.4, up to 103.3 at noon and came down with Tylenol down to 101.  Having chills coming back.  Started a dry cough this afternoon.  Using a finger oximeter at 94-95%.  Took 2 Covid tests and they were both negative.  No shortness of breath.  No chest pain.          Objective    Vitals - Patient Reported  Weight (Patient Reported): 210 lb (95.3 kg)  Pain Score: No Pain (0)        Physical Exam   Gen: No respiratory distress, dry cough noted, talking in full paragraphs without difficulty alert,   PSYCH: Alert and oriented times 3; coherent speech, normal   rate and volume, able to articulate logical thoughts, able   to abstract reason, no tangential thoughts, no hallucinations   or delusions  His affect is normal  Remainder of exam unable to be completed due to telephone visits          Phone call duration: 12 minutes

## 2023-05-26 NOTE — TELEPHONE ENCOUNTER
Lamine's wife is on the phone stating that Ej is feeling ill.  He had Chills this morning, fever 103.3 f, has taken tylenol. His fever now is 101.7. Covid test is negative.    They have been in big groups this week.  Lamine and his wife think it could be Covid and desire paxlovid.      Explained that RNs can not prescribe Paxlovid if the test is negative but they can have a virtual visit with a provider to see if Lamine qualifies for Paxlovid.    Virtual visit scheduled.      Also reviewed some OTC medications and home remedies to comfort.

## 2023-05-28 ENCOUNTER — OFFICE VISIT (OUTPATIENT)
Dept: FAMILY MEDICINE | Facility: CLINIC | Age: 68
End: 2023-05-28
Payer: MEDICARE

## 2023-05-28 VITALS
BODY MASS INDEX: 28.21 KG/M2 | RESPIRATION RATE: 16 BRPM | HEART RATE: 67 BPM | TEMPERATURE: 97.5 F | SYSTOLIC BLOOD PRESSURE: 142 MMHG | DIASTOLIC BLOOD PRESSURE: 81 MMHG | OXYGEN SATURATION: 98 % | WEIGHT: 208 LBS

## 2023-05-28 DIAGNOSIS — Z86.79 HISTORY OF ATRIAL FIBRILLATION: ICD-10-CM

## 2023-05-28 DIAGNOSIS — L03.90 CELLULITIS, UNSPECIFIED CELLULITIS SITE: Primary | ICD-10-CM

## 2023-05-28 PROCEDURE — 99214 OFFICE O/P EST MOD 30 MIN: CPT | Mod: 25 | Performed by: FAMILY MEDICINE

## 2023-05-28 PROCEDURE — 96372 THER/PROPH/DIAG INJ SC/IM: CPT | Performed by: FAMILY MEDICINE

## 2023-05-28 RX ORDER — CEPHALEXIN 500 MG/1
500 CAPSULE ORAL 4 TIMES DAILY
Qty: 40 CAPSULE | Refills: 0 | Status: SHIPPED | OUTPATIENT
Start: 2023-05-28 | End: 2023-06-07

## 2023-05-28 RX ORDER — CEFTRIAXONE SODIUM 1 G
1 VIAL (EA) INJECTION ONCE
Status: COMPLETED | OUTPATIENT
Start: 2023-05-28 | End: 2023-05-28

## 2023-05-28 RX ADMIN — Medication 1 G: at 10:26

## 2023-05-28 NOTE — PROGRESS NOTES
Benjamin Hooper is a 68 year old male who comes in today for 3 days of symptoms.    Low blood pressure 2 days ago,  90s systolic     Then bad chills , shaking    Temp got up to almost 103    Took tylenol    Taking tylenol since then    Never had covid    Patient risk averse    3x negative covid the last few days    Dry hacking cough    That got better    Took some robitussin    Had afib ablation April 4    Hit shin about 1 1/2 to 2 weeks ago    No obvious bruise after that     Drinking fluids okay    ROS    Physical Exam  Constitutional:       Appearance: He is well-developed.   HENT:      Head: Normocephalic and atraumatic.   Eyes:      Conjunctiva/sclera: Conjunctivae normal.   Neck:      Vascular: No carotid bruit.   Cardiovascular:      Rate and Rhythm: Normal rate and regular rhythm.      Heart sounds: Normal heart sounds.   Pulmonary:      Effort: Pulmonary effort is normal. No respiratory distress.      Breath sounds: Normal breath sounds.   Neurological:      Mental Status: He is alert and oriented to person, place, and time.      Cranial Nerves: No cranial nerve deficit.   Psychiatric:         Speech: Speech normal.         Behavior: Behavior normal.     patient has large red slightly warm patch on anterior right lower leg; involves a fair amount of the lower leg that side    Mildly tender     He has a couple small patches on medial right thigh area    No obvious involvement in groin/ lymph node area    He denies any redness on torso/ arms/ face     No sinus/ submandib tenderness    No pitting edema    Right foot spared    ASSESSMENT / PLAN:  (L03.90) Cellulitis, unspecified cellulitis site  (primary encounter diagnosis)  Comment: need to start with IM shot here and then start cephalexin.  Discussed in detail.  If symptoms worsen, then to emergency room as at that point would need iv antibiotics.    Plan: cefTRIAXone (ROCEPHIN) in lidocaine 1% (PF) for        IM administration 1 g, cephALEXin (KEFLEX) 500          MG capsule             (Z86.79) History of atrial fibrillation  Comment: patient in normal rhythm here.  Recently had ablation.   Plan: continue same meds.  Of note no evidence on exam of dvt and that would be very unlikely as patient is on chronic DOAC.      I reviewed the patient's medications, allergies, medical history, family history, and social history.    Benjamin Nelson MD

## 2023-05-28 NOTE — PATIENT INSTRUCTIONS
Take the cephalexin    Stay well hydrated    If symptoms worsen, to emergency room     Continue other meds

## 2023-05-31 ENCOUNTER — OFFICE VISIT (OUTPATIENT)
Dept: FAMILY MEDICINE | Facility: CLINIC | Age: 68
End: 2023-05-31
Payer: MEDICARE

## 2023-05-31 VITALS
WEIGHT: 211.19 LBS | HEIGHT: 72 IN | SYSTOLIC BLOOD PRESSURE: 135 MMHG | OXYGEN SATURATION: 98 % | RESPIRATION RATE: 16 BRPM | BODY MASS INDEX: 28.6 KG/M2 | HEART RATE: 61 BPM | TEMPERATURE: 98.5 F | DIASTOLIC BLOOD PRESSURE: 83 MMHG
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
## 2023-05-31 DIAGNOSIS — L03.90 CELLULITIS, UNSPECIFIED CELLULITIS SITE: Primary | ICD-10-CM

## 2023-05-31 PROCEDURE — 99214 OFFICE O/P EST MOD 30 MIN: CPT | Performed by: FAMILY MEDICINE

## 2023-05-31 NOTE — ASSESSMENT & PLAN NOTE
This patient had cellulitis of the right lower extremity.  His symptoms at the time of presentation were quite severe including rigors, fever as well as pain in the extremity.  His symptoms improved within hours of receiving IM ceftriaxone and has been taking cephalexin diligently with improvement day over day.  I believe that he is adequately treated and recommended that he complete the regimen.  We had a lengthy discussion about what might have caused him to have cellulitis.  No obvious break in the skin.  He leads an active lifestyle which includes taking care of his yard and in the area endemic for Lyme.  He has a history of tick bites.  He wonders if maybe the 3 linear lines on the back of his left leg could have actually been components of erythema migrans.  I think this is a possibility but would differentiate these lines with the associated tenderness from erythema migrans which I would expect to not be tender.  If he is concerned about Lyme exposure, I would recommend a Lyme titer in a couple of weeks with a confirmatory test.  Based on today's presentation, I do not recommend prophylactic therapy.  No evidence of autoimmune disorder or immune dysfunction.  No evidence of hyperglycemia or any other obvious risk factors for cellulitis.

## 2023-05-31 NOTE — PROGRESS NOTES
Assessment & Plan   Problem List Items Addressed This Visit     Cellulitis, unspecified cellulitis site - Primary     This patient had cellulitis of the right lower extremity.  His symptoms at the time of presentation were quite severe including rigors, fever as well as pain in the extremity.  His symptoms improved within hours of receiving IM ceftriaxone and has been taking cephalexin diligently with improvement day over day.  I believe that he is adequately treated and recommended that he complete the regimen.  We had a lengthy discussion about what might have caused him to have cellulitis.  No obvious break in the skin.  He leads an active lifestyle which includes taking care of his yard and in the area endemic for Lyme.  He has a history of tick bites.  He wonders if maybe the 3 linear lines on the back of his left leg could have actually been components of erythema migrans.  I think this is a possibility but would differentiate these lines with the associated tenderness from erythema migrans which I would expect to not be tender.  If he is concerned about Lyme exposure, I would recommend a Lyme titer in a couple of weeks with a confirmatory test.  Based on today's presentation, I do not recommend prophylactic therapy.  No evidence of autoimmune disorder or immune dysfunction.  No evidence of hyperglycemia or any other obvious risk factors for cellulitis.        Other Visit Diagnoses     Tick bite, unspecified site, initial encounter        Relevant Orders    Lyme Disease Total Abs Bld with Reflex to Confirm CLIA        38 minutes spent by me on the date of the encounter doing chart review, history and exam, documentation and further activities per the note    Venu Dugan MD  [facility] is a 68 year [facility], presenting for the following health issues:  Follow Up ([facility] in 05/28/23 Dx Cellulitis RT leg.)        5/31/2023    10:18 AM   Additional  Questions   Roomed by sac   Accompanied by wife         5/31/2023    10:18 AM   Patient Reported Additional Medications   Patient reports taking the following new medications no     History of Present Illness       Reason for visit:  Followup of cellulitus diagnosis, cause unknown    He eats 4 or more servings of fruits and vegetables daily.He consumes 0 sweetened beverage(s) daily.He exercises with enough effort to increase his heart rate 30 to 60 minutes per day.  He exercises with enough effort to increase his heart rate 4 days per week.   He is taking medications regularly.    Review of Systems   All other systems reviewed and are negative.           Objective    /83 (BP Location: Left arm, Patient Position: Sitting, Cuff Size: Adult Large)   Pulse 61   Temp 98.5  F (36.9  C) (Oral)   Resp 16   Ht 1.829 m (6')   Wt 95.8 kg (211 lb 3 oz)   SpO2 98%   BMI 28.64 kg/m    Body mass index is 28.64 kg/m .  Physical Exam  Nursing note reviewed.   Constitutional:       General: He is not in acute distress.     Appearance: Normal appearance. He is not ill-appearing.   HENT:      Head: Normocephalic and atraumatic.   Eyes:      Extraocular Movements: Extraocular movements intact.      Conjunctiva/sclera: Conjunctivae normal.   Pulmonary:      Effort: Pulmonary effort is normal.   Skin:     Comments: Mild erythema posterior leg  Anterior leg without erythema.    Neurological:      Mental Status: He is alert and oriented to person, place, and time.   Psychiatric:         Attention and Perception: Attention normal.         Mood and Affect: Mood normal.         Speech: Speech normal.         Thought Content: Thought content normal.

## 2023-06-02 ENCOUNTER — HEALTH MAINTENANCE LETTER (OUTPATIENT)
Age: 68
End: 2023-06-02

## 2023-07-30 DIAGNOSIS — I48.0 PAROXYSMAL ATRIAL FIBRILLATION (H): ICD-10-CM

## 2023-07-31 RX ORDER — RIVAROXABAN 20 MG/1
TABLET, FILM COATED ORAL
Qty: 90 TABLET | Refills: 2 | Status: SHIPPED | OUTPATIENT
Start: 2023-07-31 | End: 2024-04-29

## 2023-09-18 ENCOUNTER — OFFICE VISIT (OUTPATIENT)
Dept: FAMILY MEDICINE | Facility: CLINIC | Age: 68
End: 2023-09-18
Payer: MEDICARE

## 2023-09-18 VITALS
OXYGEN SATURATION: 96 % | DIASTOLIC BLOOD PRESSURE: 86 MMHG | HEART RATE: 50 BPM | SYSTOLIC BLOOD PRESSURE: 149 MMHG | RESPIRATION RATE: 16 BRPM | TEMPERATURE: 97.6 F

## 2023-09-18 DIAGNOSIS — Z86.79 HISTORY OF ATRIAL FIBRILLATION: ICD-10-CM

## 2023-09-18 DIAGNOSIS — I49.3 PVC'S (PREMATURE VENTRICULAR CONTRACTIONS): ICD-10-CM

## 2023-09-18 DIAGNOSIS — I49.9 IRREGULAR HEART RHYTHM: Primary | ICD-10-CM

## 2023-09-18 LAB
ANION GAP SERPL CALCULATED.3IONS-SCNC: 10 MMOL/L (ref 7–15)
ATRIAL RATE - MUSE: 58 BPM
BUN SERPL-MCNC: 16.1 MG/DL (ref 8–23)
CALCIUM SERPL-MCNC: 9.4 MG/DL (ref 8.8–10.2)
CHLORIDE SERPL-SCNC: 102 MMOL/L (ref 98–107)
CREAT SERPL-MCNC: 0.85 MG/DL (ref 0.67–1.17)
DEPRECATED HCO3 PLAS-SCNC: 23 MMOL/L (ref 22–29)
DIASTOLIC BLOOD PRESSURE - MUSE: NORMAL MMHG
EGFRCR SERPLBLD CKD-EPI 2021: >90 ML/MIN/1.73M2
GLUCOSE SERPL-MCNC: 102 MG/DL (ref 70–99)
INTERPRETATION ECG - MUSE: NORMAL
P AXIS - MUSE: 44 DEGREES
POTASSIUM SERPL-SCNC: 4.5 MMOL/L (ref 3.4–5.3)
PR INTERVAL - MUSE: 220 MS
QRS DURATION - MUSE: 86 MS
QT - MUSE: 466 MS
QTC - MUSE: 457 MS
R AXIS - MUSE: 20 DEGREES
SODIUM SERPL-SCNC: 135 MMOL/L (ref 136–145)
SYSTOLIC BLOOD PRESSURE - MUSE: NORMAL MMHG
T AXIS - MUSE: 46 DEGREES
VENTRICULAR RATE- MUSE: 58 BPM

## 2023-09-18 PROCEDURE — 99214 OFFICE O/P EST MOD 30 MIN: CPT | Performed by: FAMILY MEDICINE

## 2023-09-18 PROCEDURE — 93005 ELECTROCARDIOGRAM TRACING: CPT | Performed by: FAMILY MEDICINE

## 2023-09-18 PROCEDURE — 36415 COLL VENOUS BLD VENIPUNCTURE: CPT | Performed by: FAMILY MEDICINE

## 2023-09-18 PROCEDURE — 80048 BASIC METABOLIC PNL TOTAL CA: CPT | Performed by: FAMILY MEDICINE

## 2023-09-18 PROCEDURE — 93010 ELECTROCARDIOGRAM REPORT: CPT | Mod: OFF | Performed by: GENERAL ACUTE CARE HOSPITAL

## 2023-09-18 NOTE — PATIENT INSTRUCTIONS
Please discuss with your Cardiologist.    Same meds for now.    Indications for ER:  Dizziness, short of breath, worsening palpitations, chest pain.

## 2023-09-18 NOTE — PROGRESS NOTES
(I49.9) Irregular heart rhythm  (primary encounter diagnosis)  Comment:   Plan: EKG 12-lead, tracing only, Basic metabolic         panel  (Ca, Cl, CO2, Creat, Gluc, K, Na, BUN),         Adult Cardiology Eval  Referral            (I49.3) PVC's (premature ventricular contractions)  Comment:   Plan: Basic metabolic panel  (Ca, Cl, CO2, Creat,         Gluc, K, Na, BUN), Adult Cardiology Eval          Referral            (Z86.79) History of atrial fibrillation  Comment:   Plan: Adult Cardiology Eval  Referral            Discussion:  This patient who had a history of paroxysmal atrial fibrillation and s/p ablation was having increasing palpitations.  EKG shows PVCs, possible aberrant PACs at times coming out of various intervals sometimes bigeminy, sometimes as far apart as 9 or 10 beats.  Other than being annoying, they are not making him dizzy or weak or short of breath.    At this point I would like him to follow-up with cardiology and a referral was placed.  Indications to return to ER were also discussed.      CHIEF COMPLAINT    Palpitations.      HISTORY    This patient has a history of an ablation for atrial fib.  He has been noticing skipped heartbeats, more frequency in the last 3 to 4 days.  He has a past history of PVCs.  He has not felt like he did when he had atrial fib with RVR.  He would like to get things checked in any case.    He has a cardiologist in [place].  His ablation was actually performed at [place].  We went over his med list.      Current Outpatient Medications   Medication Sig Dispense Refill    Ascorbic Acid (VITAMIN C) 500 MG CHEW Take one tab daily      atorvastatin (LIPITOR) 40 MG tablet Take 0.5 tablets (20 mg) by mouth daily      azelaic acid (FINACIA) 15 % external gel Apply 1 Application topically      diclofenac (VOLTAREN) 1 % topical gel Apply 2 g topically daily as needed prn      losartan (COZAAR) 50 MG tablet Take 50 mg by mouth daily        multivitamin (CENTRUM SILVER) tablet Take 1 tablet by mouth daily      tamsulosin (FLOMAX) 0.4 MG capsule Take 0.4 mg by mouth daily       UNABLE TO FIND Apply 1 each to eye Administer 1 each into both eyes as needed. Med Name: Thera Tears      XARELTO ANTICOAGULANT 20 MG TABS tablet TAKE ONE TABLET BY MOUTH ONE TIME DAILY WITH DINNER 90 tablet 2       REVIEW OF SYSTEMS    No fever or chills.  No SOB.  No chest pain.  No edema.  No syncope.  No nausea or abdominal pain.  Nocturia 3-4 times.  No localized weakness.      EXAM  BP (!) 149/86   Pulse 50   Temp 97.6  F (36.4  C) (Oral)   Resp 16   SpO2 96%     The appears well in general.  HEENT unremarkable.  No thyromegaly.  Lungs are clear.  Cardiac RSR with occasional premature.  No murmurs or rubs.  Extremities without edema.  Gait normal.    EKG  Sinus rhythm with first-degree AV block and with PVCs.  Rate 58.  [place] borderline LAD.  Conduction otherwise normal.  ST segments and T waves WNL.  Abnormal EKG with bradycardia, first-degree AV block, frequent PVCs, possible PACs.

## 2023-10-02 ENCOUNTER — OFFICE VISIT (OUTPATIENT)
Dept: CARDIOLOGY | Facility: CLINIC | Age: 68
End: 2023-10-02
Payer: MEDICARE

## 2023-10-02 VITALS
HEART RATE: 62 BPM | BODY MASS INDEX: 28.75 KG/M2 | WEIGHT: 212 LBS | DIASTOLIC BLOOD PRESSURE: 78 MMHG | OXYGEN SATURATION: 98 % | RESPIRATION RATE: 16 BRPM | SYSTOLIC BLOOD PRESSURE: 130 MMHG

## 2023-10-02 DIAGNOSIS — I48.0 PAROXYSMAL ATRIAL FIBRILLATION (H): Primary | ICD-10-CM

## 2023-10-02 DIAGNOSIS — Z86.79 HISTORY OF ATRIAL FIBRILLATION: ICD-10-CM

## 2023-10-02 DIAGNOSIS — I49.3 PVC'S (PREMATURE VENTRICULAR CONTRACTIONS): ICD-10-CM

## 2023-10-02 DIAGNOSIS — I49.9 IRREGULAR HEART RHYTHM: ICD-10-CM

## 2023-10-02 PROCEDURE — 99214 OFFICE O/P EST MOD 30 MIN: CPT | Performed by: INTERNAL MEDICINE
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
## 2023-10-02 NOTE — PATIENT INSTRUCTIONS
[facility]  Cardiac Electrophysiology  [address]  [place] [address]   Office: [phone]  Fax: [phone]       Thank you for seeing us in clinic today - it is a pleasure to be a part of your care team.  Below is a summary of our plan from today's visit.       You have paroxysmal atrial fibrillation and underwent ablation 4/4/2023 at [facility].  You have had isolated premature ventricular contractions (PVCs) thereafter, with an overall burden of 1.92% on your 9/2023 cardiac rhythm monitoring.  - continue Xarelto  - continue to follow for more frequent PVCs  - future follow-up with EP as needed     Please do not hesitate to be in touch with our office at [phone] with any questions that may arise.       Thank you for trusting us with your care,    Major Mercado MD  Clinical Cardiac Electrophysiology  [facility]  [address]  [place] [address]   Office: [phone]  Fax: [phone]              ATRIAL FIBRILLATION: Patient Information    What is atrial fibrillation?  Atrial fibrillation (AF, A-fib) is a common heart rhythm problem (arrhythmia) occurring within the upper chambers of the heart (the atria).  In normal rhythm, the upper and lower chambers of the heart are electrically driven to contract in a coordinated sequence.  In atrial fibrillation, the atria lose their ability to contract because of rapid and chaotic electrical activity.  The lower chambers of the heart (the ventricles) continue to pump blood throughout the body, though with irregular and often faster rate due to the chaotic activity within the atria.        How do I know if I have atrial fibrillation?   Some people may feel their heart beating faster, harder, or irregularly while in atrial fibrillation.  Others may be lightheaded, fatigued, feel weak or tired or become more short of breath especially with activities.  Some patients have no  symptoms at all.  Atrial fibrillation may be found due to an irregular pulse or on an electrocardiogram (ECG). Atrial fibrillation can start and stop on its own, and episodes can last from seconds to several months.      How common is atrial fibrillation?   An estimated 3-6 million people in the [place] have atrial fibrillation.  Atrial fibrillation is a common heart rhythm problem for older persons, affecting as estimated 12-15% of people over the age of 65 years of age.    What causes atrial fibrillation?   Age is the most important risk factor for atrial fibrillation.  Atrial fibrillation is more common in people with other heart disease, high blood pressure, diabetes, obesity, sleep apnea and in people who regularly consume alcohol.  Surgery, lung disease, or thyroid problems can lead to atrial fibrillation.  Atrial fibrillation has multiple possible causes, and in most cases a single cause cannot be found.  Atrial fibrillation is a progressive condition, usually starting with at an early stage with short and infrequent episodes.  In later stages of disease, more frequent and longer lasting episodes of atrial fibrillation occur, ultimately culminating in episodes which do not spontaneously terminate.  Generally, more enlargement and scarring within the upper chambers of the heart is observed as atrial fibrillation progresses from early to late-stage disease.    How is atrial fibrillation diagnosed and evaluated?    Because of its start-stop nature, atrial fibrillation can be challenging to diagnose.  Atrial fibrillation is most commonly diagnosed via cardiac rhythm recordings - either an ECG or wearable cardiac rhythm monitor.  For patients with pacemakers, defibrillators or implantable loop recorders, atrial fibrillation may be recorded via these devices.  Recently, commercially available devices (eg. Apple Watch, [employer] device, certain FitBit devices, others) can allow patients to take 30 second cardiac  rhythm recordings which may document atrial fibrillation.  Once atrial fibrillation is diagnosed, additional tests include blood tests and an echocardiogram.  The echocardiogram uses ultrasound to look at your heart to assess your cardiac function and evaluate for other heart disease.  Additional evaluation may include CT or MRI studies.    Is atrial fibrillation dangerous?   Atrial fibrillation is not usually a life-threatening arrhythmia.  The most serious consequences of atrial fibrillation including stroke and worsening of overall cardiac function.  While in atrial fibrillation, the upper cardiac chambers do not contract normally, resulting in slower blood flow and increased risk of clot formation.  If this blood clot becomes detached from the heart a stroke can occur.  Unfortunately, stroke can be the first sign of atrial fibrillation for some people.  With a stroke, you may notice abnormal sensation, weakness on one side of the body or face, changes in your vision or speech.  If you have any of these signs, you should contact EMS and be evaluated in an emergency room as soon as possible.      How is atrial fibrillation treated?     Several treatment options exist for suppressing atrial fibrillation - however, it is not an easily curable arrhythmia.  The first goal in managing atrial fibrillation is to minimize stroke risk.  The second goal is to improve symptoms associated with atrial fibrillation.  Finally, in patients with reduced cardiac function, maintaining normal rhythm can help improve cardiac function.      Blood thinners are used to reduce the risk of stroke in patients with high estimated stroke risk related to atrial fibrillation.  For patients at higher risk of bleeding related to blood thinner use, implantable devices may be an option to reduce stroke risk without the need for long term blood thinner use.      Atrial fibrillation can be managed via two strategies: rate control and rhythm control.   In a rate control strategy, continued atrial fibrillation is accepted and medications (eg. beta-blockers or calcium channel blockers) are used to control the lower chamber rate.  In a rhythm control strategy, anti-arrhythmic medications or catheter ablation are used to maintain normal cardiac rhythm and slow disease progression by suppressing atrial fibrillation.  A procedure called a cardioversion, in which an electric shock is delivered through patches placed on the chest wall while under deep sedation, can be performed to temporarily restore normal cardiac rhythm, though does not address the chance of atrial fibrillation recurrence.  Treatments are more effective for earlier rather than later stage atrial fibrillation.  Lifestyle modifications (maintaining a healthy weight, aerobic exercise, diagnosing and treating sleep apnea, and minimizing alcohol intake) are important elements of atrial fibrillation rhythm control.     What is catheter ablation for atrial fibrillation?  Cardiac catheter ablation is a commonly performed, minimally invasive procedure performed by a cardiac electrophysiologist to treat many different cardiac rhythm abnormalities.  During catheter ablation, long, thin, flexible tubes are advanced into the heart via small sheaths inserted into the femoral veins and thermal energy (either heating or cooling) is applied within the heart to disrupt abnormal electrical activity.  Atrial fibrillation ablation is performed under general anesthesia, with procedures generally taking approximately 2-3 hours.  Patients are typically observed for 3-5 hours after the ablation, and in most cases can be discharged home the same day.  Atrial fibrillation ablation is associated with better outcomes (mortality, cardiovascular hospitalizations, atrial arrhythmia recurrences) compared to antiarrhythmic drug therapy.  However, atrial fibrillation recurrences are not uncommon, and repeat catheter ablation may be  offered.  Your electrophysiology team can review atrial fibrillation ablation, anticipated success rates, risks, and recovery expectations with you.    What are anti-arrhythmic medications?  Anti-arrhythmic medications are specialized drugs which alter cardiac electrical functioning to suppress arrhythmias.  There are several anti-arrhythmic medications available, each with its own success rate and side effects.  Some anti-arrhythmic medications are less effective though safer to use, others are more effective though have serious potential toxicities.  Atrial fibrillation recurrences are common and may require dose adjustment or change in antiarrhythmic therapy.  Your electrophysiology team will carefully consider which medication would be the best and safest for your particular case.      Can I live a normal life?    The goal of atrial fibrillation management is for patients to live normal lives without being limited by symptoms related to atrial fibrillation.    Are any additional educational resources available?  There are a number of excellent atrial fibrillation education resources available to you online.  A few options you may wish to review include:  hrsonline.org/guide-atrial-fibrillation  afibmatters.org  getsmartaboutafib.com  stopaf.com    What comes next?    Consider your management options and let us know how we can help in your decision process.  Please take medications as they have been prescribed.  You should also get any tests that may have been ordered for you.      When to Call Your Doctor or seek emergency care:  Call your doctor or seek emergency care if you have any significant changes with the following:  Weakness  Dizziness  Fainting  Fatigue  Shortness of breath  Chest pain with increased activity  If you are concerned that your heart rate is too fast or too slow  Bleeding that does not stop in 10 minutes  Coughing or throwing up blood  Bloody diarrhea or bleeding hemorrhoids  Dark-colored  urine or black stool  Allergic reactions:  Rash  Itching  Swelling  Trouble breathing or swallowing      Please call the [facility] at [phone] if you have concerns about your symptoms, your medicines, or your follow-up appointments.

## 2023-10-13 ENCOUNTER — LAB (OUTPATIENT)
Dept: LAB | Facility: CLINIC | Age: 68
End: 2023-10-13
Payer: MEDICARE

## 2023-10-13 DIAGNOSIS — E78.5 DYSLIPIDEMIA: ICD-10-CM

## 2023-10-13 LAB
CHOLEST SERPL-MCNC: 135 MG/DL
HDLC SERPL-MCNC: 42 MG/DL
LDLC SERPL CALC-MCNC: 73 MG/DL
NONHDLC SERPL-MCNC: 93 MG/DL
TRIGL SERPL-MCNC: 100 MG/DL

## 2023-10-13 PROCEDURE — 80061 LIPID PANEL: CPT

## 2023-10-13 PROCEDURE — 36415 COLL VENOUS BLD VENIPUNCTURE: CPT

## 2023-10-17 DIAGNOSIS — E78.5 DYSLIPIDEMIA: Primary | ICD-10-CM

## 2023-11-08 ENCOUNTER — TELEPHONE (OUTPATIENT)
Dept: FAMILY MEDICINE | Facility: CLINIC | Age: 68
End: 2023-11-08
Payer: MEDICARE

## 2023-11-08 NOTE — TELEPHONE ENCOUNTER
Patient Quality Outreach    Patient is due for the following:   Physical Annual Wellness Visit    Next Steps:   Patient has upcoming appointment, these items will be addressed at that time.  Next AWV appointment: 12/4/23 w/Dr. Dugan. xl  Type of outreach:    Chart review performed, no outreach needed.      Questions for provider review:    None           Joe Rogel MA

## 2023-11-10 ENCOUNTER — IMMUNIZATION (OUTPATIENT)
Dept: FAMILY MEDICINE | Facility: CLINIC | Age: 68
End: 2023-11-10
Payer: MEDICARE

## 2023-11-10 PROCEDURE — G0008 ADMIN INFLUENZA VIRUS VAC: HCPCS

## 2023-11-10 PROCEDURE — 90662 IIV NO PRSV INCREASED AG IM: CPT

## 2023-11-28 ASSESSMENT — ENCOUNTER SYMPTOMS
CHILLS: 0
SHORTNESS OF BREATH: 0
DYSURIA: 0
CONSTIPATION: 0
ABDOMINAL PAIN: 0
ARTHRALGIAS: 1
HEMATURIA: 0
PARESTHESIAS: 0
WEAKNESS: 0
HEADACHES: 0
JOINT SWELLING: 1
HEARTBURN: 0
PALPITATIONS: 0
NERVOUS/ANXIOUS: 0
SORE THROAT: 0
COUGH: 0
FEVER: 0
NAUSEA: 0
HEMATOCHEZIA: 0
DIZZINESS: 0
DIARRHEA: 0
FREQUENCY: 1
MYALGIAS: 0
EYE PAIN: 0

## 2023-11-28 ASSESSMENT — ACTIVITIES OF DAILY LIVING (ADL): CURRENT_FUNCTION: NO ASSISTANCE NEEDED

## 2023-11-30 ENCOUNTER — OFFICE VISIT (OUTPATIENT)
Dept: CARDIOLOGY | Facility: CLINIC | Age: 68
End: 2023-11-30
Attending: INTERNAL MEDICINE
Payer: MEDICARE

## 2023-11-30 VITALS
TEMPERATURE: 99 F | DIASTOLIC BLOOD PRESSURE: 89 MMHG | RESPIRATION RATE: 14 BRPM | HEIGHT: 72 IN | OXYGEN SATURATION: 98 % | HEART RATE: 69 BPM | SYSTOLIC BLOOD PRESSURE: 134 MMHG | BODY MASS INDEX: 29.16 KG/M2 | WEIGHT: 215.3 LBS

## 2023-11-30 DIAGNOSIS — E78.5 DYSLIPIDEMIA: ICD-10-CM

## 2023-11-30 DIAGNOSIS — I25.10 CORONARY ARTERY DISEASE INVOLVING NATIVE CORONARY ARTERY OF NATIVE HEART WITHOUT ANGINA PECTORIS: ICD-10-CM

## 2023-11-30 DIAGNOSIS — I48.91 ATRIAL FIBRILLATION, UNSPECIFIED TYPE (H): Primary | ICD-10-CM

## 2023-11-30 DIAGNOSIS — I10 HYPERTENSION, UNSPECIFIED TYPE: ICD-10-CM

## 2023-11-30 PROCEDURE — 99214 OFFICE O/P EST MOD 30 MIN: CPT | Performed by: INTERNAL MEDICINE
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
## 2023-11-30 NOTE — PROGRESS NOTES
[facility] [address], [place] [address]   [email or web address]   OFFICE: [phone]          Impression and Plan     1.  Atrial fibrillation.  Lamine has history of recurrent atrial fibrillation. Lamine ultimately underwent atrial fibrillation ablation/PVI at the [facility] 3 April 2023.     Lamine reports no subjective recurrence of his atrial fibrillation since his PVI.  Plan:  Continue rivaroxaban 20 mg daily.    2.  Coronary artery disease.  Lamine has coronary artery disease by virtue of CT coronary angiography 6 April 2022 revealing mild-moderate obstructive disease (see Cardiac Diagnostic section below).     3.  Hypertension.   Blood pressure is fairly reasonable in the office today.  He brought in myriad readings from home that are all quite good.  Continue losartan 50 mg daily.     4.  Dyslipidemia.  Lipid profile 13 October 2023 revealed LDL 73 mg/dL and HDL 42 mg/dL.  Continue atorvastatin 20 mg daily.     Plan on follow-up in approximately 1 year.    35 minutes spent reviewing prior records (including documentation, laboratory studies, cardiac testing/imaging), interview with patient along with physical exam, planning, and subsequent documentation/crafting of note).           History of Present Illness    Once again I would like to thank you again for asking me to participate in the care of your patient, Benjamin Hooper.  As you know, but to reiterate for my own records, Benjamin Hooper is a 68 year old male with recurrent atrial fibrillation.      Lamine ultimately underwent atrial fibrillation ablation/PVI at the [facility] 3 April 2023.     Since his atrial fibrillation ablation/PVI he has noted no subjective recurrence of atrial fibrillation.  He earlier this year he had had some increasing subjective PVCs though these have become more quiescent as of late.  He overall is pleased with how he is performing.  He denies chest pain or  shortness of breath.    Further review of systems is otherwise negative/noncontributory (medical record and 13 point review of systems reviewed as well and pertinent positives noted).         Cardiac Diagnostics      Echocardiogram 26 January 2023 (performed at [facility]):  Normal left ventricular size and systolic performance with ejection fraction of 64%.  No significant valvular heart disease.  Normal right ventricular size and systolic performance.  Biatrial enlargement (not quantified in report).    Echocardiogram 21 February 2022:  Normal left ventricular size and systolic performance with ejection fraction of 60 to 65%.  No significant valvular heart disease.  Borderline right ventricular enlargement with normal right ventricular systolic performance.  Mild left atrial enlargement.  Borderline right atrial enlargement.    CT coronary angiogram 6 April 2022:  Left main coronary artery: Normal.  Left anterior descending coronary artery: Mid 25-49% stenosis.  Ramus intermedius.  Large vessel and normal.  Circumflex coronary artery: Minimal luminal irregularities.  Right coronary artery: Moderate stenosis of 50-69%.  Mild left atrial enlargement.  Borderline right atrial enlargement.    Ambulatory monitor 12 February 2022:  The basic rhythm was sinus. The total analyzed time was 23h 53m. The heart rate varied from 46 to 119 bpm. The average HR was 68 bpm.   Premature ventricular complexes were noted singly and in two pairs. There were 760 PVCs recorded with a PVC burden of less than 1%.   Premature supraventricular complexes were noted singly, with aberrancy, in bigeminy, paired and in 3-5 beat atrial runs. The maximum atrial run rate was 123 BPM. There were 86 PACs recorded with a PAC burden of less than 1%.   A total of 1 symptomatic event was noted with no symptom recorded in which to correlate.         Physical Examination       /89 (BP Location: Left arm, Patient Position: Sitting, Cuff Size: Adult  Regular)   Pulse 69   Temp 99  F (37.2  C) (Oral)   Resp 14   Ht 1.829 m (6')   Wt 97.7 kg (215 lb 4.8 oz)   SpO2 98%   BMI 29.20 kg/m          Wt Readings from Last 3 Encounters:   11/30/23 97.7 kg (215 lb 4.8 oz)   10/02/23 96.2 kg (212 lb)   05/31/23 95.8 kg (211 lb 3 oz)       The patient is alert and oriented times three. Sclerae are anicteric. Mucosal membranes are moist. Jugular venous pressure is normal. No significant adenopathy/thyromegally appreciated. Lungs are clear with good expansion. On cardiovascular exam, the patient has a regular S1 and S2. Abdomen is soft and non-tender. Extremities reveal no clubbing, cyanosis, or edema.         Family History/Social History/Risk Factors   Patient does not smoke.  Family history reviewed, and family history includes Breast Cancer in his sister; Cancer in his father and mother; Hypertension in his father and mother; Osteopenia in his sister; Osteoporosis in his mother, sister, and sister; Other - See Comments in his brother; Other Cancer in his father and mother; Pulmonary Embolism in his sister.          Medical History  Surgical History Family History Social History   Past Medical History:   Diagnosis Date    Arthritis 1/1/2015    Base of thumb    Cancer (H) 1/1/2001    AFX skin tumor on chin    Cyclotropia 7/5/2018    Diplopia     H/O magnetic resonance imaging     MRI 10/02/17 with and without contrast (outside imaging) without abnormality    HLD (hyperlipidemia)     Hypertension     Nonsenile cataract     Paroxysmal atrial fibrillation (H) 1/25/2022    Plantar fasciitis 8/28/2020    Trauma     Trauma as child 1-2 years of age, dropped onto the ground. Unknown how fell but developed a black tooth after being dropped.     Past Surgical History:   Procedure Laterality Date    EYE SURGERY      due to double vision 2018    HERNIA REPAIR      2010    NO HISTORY OF SURGERY       Family History   Problem Relation Age of Onset    Hypertension Mother     Cancer  Mother         lung     Other Cancer Mother         Lung cancer    Osteoporosis Mother     Hypertension Father     Cancer Father         lung     Other Cancer Father         Lung cancer    Pulmonary Embolism Sister     Breast Cancer Sister     Osteoporosis Sister     Other - See Comments Brother         prostate issue-pt had TURP     Osteopenia Sister     Osteoporosis Sister     Amblyopia No family hx of     Strabismus No family hx of     Colon Cancer No family hx of     Prostate Cancer No family hx of         Social History     Socioeconomic History    Marital status:      Spouse name: Not on file    Number of children: Not on file    Years of education: Not on file    Highest education level: Not on file   Occupational History    Not on file   Tobacco Use    Smoking status: Never    Smokeless tobacco: Never   Vaping Use    Vaping Use: Never used   Substance and Sexual Activity    Alcohol use: Yes    Drug use: Never    Sexual activity: Not Currently     Birth control/protection: None   Other Topics Concern    Parent/sibling w/ CABG, MI or angioplasty before 65F 55M? No   Social History Narrative    Not on file     Social Determinants of Health     Financial Resource Strain: Low Risk  (11/28/2023)    Financial Resource Strain     Within the past 12 months, have you or your family members you live with been unable to get utilities (heat, electricity) when it was really needed?: No   Food Insecurity: Low Risk  (11/28/2023)    Food Insecurity     Within the past 12 months, did you worry that your food would run out before you got money to buy more?: No     Within the past 12 months, did the food you bought just not last and you didn t have money to get more?: No   Transportation Needs: Low Risk  (11/28/2023)    Transportation Needs     Within the past 12 months, has lack of transportation kept you from medical appointments, getting your medicines, non-medical meetings or appointments, work, or from getting  things that you need?: No   Physical Activity: Not on file   Stress: Not on file   Social Connections: Not on file   Interpersonal Safety: Not on file   Housing Stability: Low Risk  (11/28/2023)    Housing Stability     Do you have housing? : Yes     Are you worried about losing your housing?: No           Medications  Allergies   Current Outpatient Medications   Medication Sig Dispense Refill    Ascorbic Acid (VITAMIN C) 500 MG CHEW Take one tab daily      atorvastatin (LIPITOR) 40 MG tablet Take 0.5 tablets (20 mg) by mouth daily      azelaic acid (FINACIA) 15 % external gel Apply 1 Application topically      diclofenac (VOLTAREN) 1 % topical gel Apply 2 g topically daily as needed prn      losartan (COZAAR) 50 MG tablet Take 50 mg by mouth daily       multivitamin (CENTRUM SILVER) tablet Take 1 tablet by mouth daily      tamsulosin (FLOMAX) 0.4 MG capsule Take 0.4 mg by mouth daily       UNABLE TO FIND Apply 1 each to eye Administer 1 each into both eyes as needed. Med Name: Thera Tears      XARELTO ANTICOAGULANT 20 MG TABS tablet TAKE ONE TABLET BY MOUTH ONE TIME DAILY WITH DINNER 90 tablet 2       Allergies   Allergen Reactions    Mupirocin Hives     Hives, rash and itching when mupirocin used in nares while taking oral doxycycline (no previous reaction when using mupirocin alone or doxycycline alone)    Cats Difficulty breathing and Itching    Sestamibi [Technetium-99m] Hives     3/15/2022:  Pt developed hives on torso and limbs approximately 10 minutes after resting nuclear tracer given.  Pt has no complaints of shortness of breath or airway problems.  Did not proceed with the rest of the stress test.      Gramineae Pollens Itching     cough  cough  cough          Lab Results    Chemistry/lipid CBC Cardiac Enzymes/BNP/TSH/INR   Recent Labs   Lab Test 10/13/23  0910   CHOL 135   HDL 42   LDL 73   TRIG 100     Recent Labs   Lab Test 10/13/23  0910 05/19/23  0937 02/01/18  1343   LDL 73 87 105     Recent Labs  "  Lab Test 09/18/23  1600   *   POTASSIUM 4.5   CHLORIDE 102   CO2 23   *   BUN 16.1   CR 0.85   GFRESTIMATED >90   NORAH 9.4     Recent Labs   Lab Test 09/18/23  1600 04/12/23  1522 03/06/22  0012   CR 0.85 0.83 0.79     No results for input(s): \"A1C\" in the last 76609 hours.       Recent Labs   Lab Test 04/12/23  1522   WBC 6.4   HGB 16.9   HCT 48.8   MCV 86        Recent Labs   Lab Test 04/12/23  1522 03/06/22  0012 12/17/17  1745   HGB 16.9 17.2 16.9    Recent Labs   Lab Test 03/06/22  0012   TROPONINI <0.01     No results for input(s): \"BNP\", \"NTBNPI\", \"NTBNP\" in the last 05177 hours.  Recent Labs   Lab Test 09/27/17  0000   TSH 1.34     Recent Labs   Lab Test 03/06/22  0012   INR 1.88*          Medications  Allergies   Current Outpatient Medications   Medication Sig Dispense Refill    Ascorbic Acid (VITAMIN C) 500 MG CHEW Take one tab daily      atorvastatin (LIPITOR) 40 MG tablet Take 0.5 tablets (20 mg) by mouth daily      azelaic acid (FINACIA) 15 % external gel Apply 1 Application topically      diclofenac (VOLTAREN) 1 % topical gel Apply 2 g topically daily as needed prn      losartan (COZAAR) 50 MG tablet Take 50 mg by mouth daily       multivitamin (CENTRUM SILVER) tablet Take 1 tablet by mouth daily      tamsulosin (FLOMAX) 0.4 MG capsule Take 0.4 mg by mouth daily       UNABLE TO FIND Apply 1 each to eye Administer 1 each into both eyes as needed. Med Name: Thera Tears      XARELTO ANTICOAGULANT 20 MG TABS tablet TAKE ONE TABLET BY MOUTH ONE TIME DAILY WITH DINNER 90 tablet 2      Allergies   Allergen Reactions    Mupirocin Hives     Hives, rash and itching when mupirocin used in nares while taking oral doxycycline (no previous reaction when using mupirocin alone or doxycycline alone)    Cats Difficulty breathing and Itching    Sestamibi [Technetium-99m] Hives     3/15/2022:  Pt developed hives on torso and limbs approximately 10 minutes after resting nuclear tracer given.  Pt has no " "complaints of shortness of breath or airway problems.  Did not proceed with the rest of the stress test.      Gramineae Pollens Itching     cough  cough  cough          Lab Results   Lab Results   Component Value Date     09/18/2023     12/17/2017    CO2 23 09/18/2023    CO2 20 04/12/2023    CO2 24 12/17/2017    BUN 16.1 09/18/2023    BUN 14 04/12/2023    BUN 14 12/17/2017     Lab Results   Component Value Date    WBC 6.4 04/12/2023    WBC 9.3 12/17/2017    HGB 16.9 04/12/2023    HGB 16.9 12/17/2017    HCT 48.8 04/12/2023    HCT 49.2 12/17/2017    MCV 86 04/12/2023    MCV 87 12/17/2017     04/12/2023     12/17/2017     Lab Results   Component Value Date    CHOL 135 10/13/2023    TRIG 100 10/13/2023    HDL 42 10/13/2023     Lab Results   Component Value Date    INR 1.88 03/06/2022     No results found for: \"BNP\"  Lab Results   Component Value Date    TROPONINI <0.01 03/06/2022     Lab Results   Component Value Date    TSH 1.34 09/27/2017                  "

## 2023-12-04 ENCOUNTER — OFFICE VISIT (OUTPATIENT)
Dept: FAMILY MEDICINE | Facility: CLINIC | Age: 68
End: 2023-12-04
Payer: MEDICARE

## 2023-12-04 VITALS
HEART RATE: 60 BPM | WEIGHT: 214.5 LBS | OXYGEN SATURATION: 100 % | DIASTOLIC BLOOD PRESSURE: 88 MMHG | RESPIRATION RATE: 15 BRPM | BODY MASS INDEX: 29.05 KG/M2 | TEMPERATURE: 98 F | HEIGHT: 72 IN | SYSTOLIC BLOOD PRESSURE: 138 MMHG

## 2023-12-04 DIAGNOSIS — E78.2 MIXED HYPERLIPIDEMIA: ICD-10-CM

## 2023-12-04 DIAGNOSIS — R39.9 LOWER URINARY TRACT SYMPTOMS (LUTS): ICD-10-CM

## 2023-12-04 DIAGNOSIS — Z12.5 SCREENING FOR PROSTATE CANCER: ICD-10-CM

## 2023-12-04 DIAGNOSIS — Z86.79 S/P ABLATION OF ATRIAL FIBRILLATION: ICD-10-CM

## 2023-12-04 DIAGNOSIS — L03.90 CELLULITIS, UNSPECIFIED CELLULITIS SITE: ICD-10-CM

## 2023-12-04 DIAGNOSIS — N40.0 BENIGN PROSTATIC HYPERPLASIA WITHOUT URINARY OBSTRUCTION: ICD-10-CM

## 2023-12-04 DIAGNOSIS — Z98.890 S/P ABLATION OF ATRIAL FIBRILLATION: ICD-10-CM

## 2023-12-04 DIAGNOSIS — I10 ESSENTIAL HYPERTENSION: ICD-10-CM

## 2023-12-04 DIAGNOSIS — Z00.00 ENCOUNTER FOR MEDICARE ANNUAL WELLNESS EXAM: Primary | ICD-10-CM

## 2023-12-04 DIAGNOSIS — I25.10 ATHEROSCLEROSIS OF NATIVE CORONARY ARTERY OF NATIVE HEART WITHOUT ANGINA PECTORIS: ICD-10-CM

## 2023-12-04 DIAGNOSIS — N52.9 ERECTILE DYSFUNCTION, UNSPECIFIED ERECTILE DYSFUNCTION TYPE: ICD-10-CM

## 2023-12-04 DIAGNOSIS — C44.92 SCC (SQUAMOUS CELL CARCINOMA): ICD-10-CM

## 2023-12-04 PROBLEM — M76.71 PERONEAL TENDINITIS OF RIGHT LOWER EXTREMITY: Status: RESOLVED | Noted: 2021-09-14 | Resolved: 2023-12-04

## 2023-12-04 PROCEDURE — 80048 BASIC METABOLIC PNL TOTAL CA: CPT | Performed by: FAMILY MEDICINE

## 2023-12-04 PROCEDURE — 99214 OFFICE O/P EST MOD 30 MIN: CPT | Mod: 25 | Performed by: FAMILY MEDICINE

## 2023-12-04 PROCEDURE — G0103 PSA SCREENING: HCPCS | Performed by: FAMILY MEDICINE

## 2023-12-04 PROCEDURE — 84460 ALANINE AMINO (ALT) (SGPT): CPT | Performed by: FAMILY MEDICINE

## 2023-12-04 PROCEDURE — G0439 PPPS, SUBSEQ VISIT: HCPCS | Performed by: FAMILY MEDICINE

## 2023-12-04 PROCEDURE — 36415 COLL VENOUS BLD VENIPUNCTURE: CPT | Performed by: FAMILY MEDICINE

## 2023-12-04 RX ORDER — ATORVASTATIN CALCIUM 40 MG/1
20 TABLET, FILM COATED ORAL DAILY
Qty: 90 TABLET | Refills: 3 | Status: SHIPPED | OUTPATIENT
Start: 2023-12-04 | End: 2024-05-31

## 2023-12-04 RX ORDER — TADALAFIL 5 MG/1
5 TABLET ORAL DAILY
Qty: 90 TABLET | Refills: 1 | Status: SHIPPED | OUTPATIENT
Start: 2023-12-04 | End: 2024-04-30

## 2023-12-04 RX ORDER — RESPIRATORY SYNCYTIAL VIRUS VACCINE 120MCG/0.5
0.5 KIT INTRAMUSCULAR ONCE
Qty: 1 EACH | Refills: 0 | Status: CANCELLED | OUTPATIENT
Start: 2023-12-04 | End: 2023-12-04

## 2023-12-04 RX ORDER — LOSARTAN POTASSIUM 50 MG/1
50 TABLET ORAL DAILY
Qty: 90 TABLET | Refills: 3 | Status: SHIPPED | OUTPATIENT
Start: 2023-12-04

## 2023-12-04 ASSESSMENT — ENCOUNTER SYMPTOMS
COUGH: 0
ABDOMINAL PAIN: 0
CONSTIPATION: 0
DYSURIA: 0
EYE PAIN: 0
HEMATURIA: 0
PALPITATIONS: 0
HEMATOCHEZIA: 0
HEADACHES: 0
PARESTHESIAS: 0
FREQUENCY: 1
HEARTBURN: 0
FEVER: 0
ARTHRALGIAS: 1
SORE THROAT: 0
DIARRHEA: 0
MYALGIAS: 0
NERVOUS/ANXIOUS: 0
WEAKNESS: 0
NAUSEA: 0
SHORTNESS OF BREATH: 0
CHILLS: 0
JOINT SWELLING: 1
DIZZINESS: 0

## 2023-12-04 ASSESSMENT — ACTIVITIES OF DAILY LIVING (ADL): CURRENT_FUNCTION: NO ASSISTANCE NEEDED

## 2023-12-04 NOTE — PROGRESS NOTES
"SUBJECTIVE:   Lamine is a 68 year old, presenting for the following:  Wellness Visit (Non-fasting)        12/4/2023    10:51 AM   Additional Questions   Roomed by xl     No recurrence of cellulitis.    PVCs: innumerable in September.  Was seen in [facility].  ECG at that time looked okay and did show PVC. He saw Kamron. He had holter (reviewed through [facility]).     LUTS:    - continues to struggle.  Has been on flomax for years until recently.  He has been off for the past few weeks.  Nocturia x3    HTN:   - better last week with cardiology.  He measures regularly and recent measurements have been high in clinic.     Exercise: walks on treadmill at incline.  45 minutes 4-5x/week.  Has weights. No strength training currently.    Sore throat: now getting better.  Runny nose.  He developed a bloody nose.      Are you in the first 12 months of your Medicare coverage?  No    Healthy Habits:     In general, how would you rate your overall health?  Good    Frequency of exercise:  4-5 days/week    Duration of exercise:  45-60 minutes    Do you usually eat at least 4 servings of fruit and vegetables a day, include whole grains    & fiber and avoid regularly eating high fat or \"junk\" foods?  Yes    Taking medications regularly:  Yes    Medication side effects:  None    Ability to successfully perform activities of daily living:  No assistance needed    Home Safety:  Lack of grab bars in the bathroom    Hearing Impairment:  Difficulty following a conversation in a noisy restaurant or crowded room    In the past 6 months, have you been bothered by leaking of urine?  No    In general, how would you rate your overall mental or emotional health?  Good    Additional concerns today:  No    Today's PHQ-2 Score:       12/3/2023     8:27 PM   PHQ-2 ( 1999 [employer])   Q1: Little interest or pleasure in doing things 0   Q2: Feeling down, depressed or hopeless 0   PHQ-2 Score 0   Q1: Little interest or pleasure in doing things Not at all   Q2: " Feeling down, depressed or hopeless Not at all   PHQ-2 Score 0     Have you ever done Advance Care Planning? (For example, a Health Directive, POLST, or a discussion with a medical provider or your loved ones about your wishes): No, advance care planning information given to patient to review.  Patient plans to discuss their wishes with loved ones or provider.         Fall risk  Fallen 2 or more times in the past year?: No  Any fall with injury in the past year?: No    Cognitive Screening   1) Repeat 3 items (Leader, Season, Table)  Normal  2) Clock draw: NORMAL  3) 3 item recall: Recalls 3 objects  Results: 3 items recalled: COGNITIVE IMPAIRMENT LESS LIKELY    Mini-CogTM Copyright S Antione. Licensed by the author for use in [facility]; reprinted with permission (elvin@[facility]). All rights reserved.        Reviewed and updated as needed this visit by clinical staff   Tobacco  Allergies  Meds              Reviewed and updated as needed this visit by Provider                 Social History     Tobacco Use    Smoking status: Never    Smokeless tobacco: Never   Substance Use Topics    Alcohol use: Yes             11/28/2023     2:48 PM   Alcohol Use   Prescreen: >3 drinks/day or >7 drinks/week? No     Do you have a current opioid prescription? No  Do you use any other controlled substances or medications that are not prescribed by a provider? None    Current providers sharing in care for this patient include:   Patient Care Team:  Venu Dugan MD as PCP - General (Family Medicine)  Venu Dugan MD as Assigned PCP  Major Mercado MD as MD (Cardiovascular Disease)  Major Mercado MD as Assigned Heart and Vascular Provider    The following health maintenance items are reviewed in Epic and correct as of today:  Health Maintenance   Topic Date Due    RSV VACCINE (Pregnancy & 60+) (1 - 1-dose 60+ series) Never done    COLORECTAL CANCER SCREENING  07/31/2024    MEDICARE ANNUAL  "WELLNESS VISIT  12/04/2024    ANNUAL REVIEW OF HM ORDERS  12/04/2024    FALL RISK ASSESSMENT  12/04/2024    DTAP/TDAP/TD IMMUNIZATION (3 - Td or Tdap) 11/27/2025    LIPID  10/13/2028    ADVANCE CARE PLANNING  12/04/2028    PHQ-2 (once per calendar year)  Completed    INFLUENZA VACCINE  Completed    Pneumococcal Vaccine: 65+ Years  Completed    ZOSTER IMMUNIZATION  Completed    COVID-19 Vaccine  Completed    IPV IMMUNIZATION  Aged Out    HPV IMMUNIZATION  Aged Out    MENINGITIS IMMUNIZATION  Aged Out    RSV MONOCLONAL ANTIBODY  Aged Out    HEPATITIS C SCREENING  Discontinued    AORTIC ANEURYSM SCREENING (SYSTEM ASSIGNED)  Discontinued     Review of Systems   Constitutional:  Negative for chills and fever.   HENT:  Negative for congestion, ear pain, hearing loss and sore throat.    Eyes:  Negative for pain and visual disturbance.   Respiratory:  Negative for cough and shortness of breath.    Cardiovascular:  Negative for chest pain, palpitations and peripheral edema.   Gastrointestinal:  Negative for abdominal pain, constipation, diarrhea, heartburn, hematochezia and nausea.   Genitourinary:  Positive for frequency and impotence. Negative for dysuria, genital sores, hematuria, penile discharge and urgency.   Musculoskeletal:  Positive for arthralgias and joint swelling. Negative for myalgias.   Skin:  Negative for rash.   Neurological:  Negative for dizziness, weakness, headaches and paresthesias.   Psychiatric/Behavioral:  Negative for mood changes. The patient is not nervous/anxious.        OBJECTIVE:   /88   Pulse 60   Temp 98  F (36.7  C) (Oral)   Resp 15   Ht 1.816 m (5' 11.5\")   Wt 97.3 kg (214 lb 8 oz)   SpO2 100%   BMI 29.50 kg/m   Estimated body mass index is 29.5 kg/m  as calculated from the following:    Height as of this encounter: 1.816 m (5' 11.5\").    Weight as of this encounter: 97.3 kg (214 lb 8 oz).  Physical Exam  Vitals reviewed.   Constitutional:       General: He is not in acute " distress.     Appearance: Normal appearance. He is not ill-appearing.   HENT:      Head: Normocephalic and atraumatic.      Right Ear: External ear normal.      Left Ear: External ear normal.      Nose: Nose normal.      Mouth/Throat:      Pharynx: Oropharynx is clear. No oropharyngeal exudate or posterior oropharyngeal erythema.   Eyes:      General: No scleral icterus.        Right eye: No discharge.         Left eye: No discharge.      Extraocular Movements: Extraocular movements intact.      Conjunctiva/sclera: Conjunctivae normal.      Pupils: Pupils are equal, round, and reactive to light.   Neck:      Comments: No thyromegaly.  Cardiovascular:      Rate and Rhythm: Normal rate and regular rhythm.      Heart sounds: Normal heart sounds. No murmur heard.     No friction rub. No gallop.   Pulmonary:      Effort: Pulmonary effort is normal. No respiratory distress.      Breath sounds: Normal breath sounds. No wheezing or rales.   Abdominal:      General: There is no distension.      Palpations: Abdomen is soft. There is no mass.      Tenderness: There is no abdominal tenderness.   Musculoskeletal:         General: No signs of injury. Normal range of motion.      Cervical back: Normal range of motion.      Right lower leg: No edema.      Left lower leg: No edema.   Lymphadenopathy:      Cervical: No cervical adenopathy.   Skin:     General: Skin is warm.      Coloration: Skin is not jaundiced.      Findings: No rash.   Neurological:      General: No focal deficit present.      Mental Status: He is alert and oriented to person, place, and time.      Cranial Nerves: No cranial nerve deficit.      Deep Tendon Reflexes: Reflexes normal.   Psychiatric:         Mood and Affect: Mood normal.         Diagnostic Test Results:  Labs reviewed in Epic    ASSESSMENT / PLAN:     Problem List Items Addressed This Visit       Atherosclerotic heart disease of native coronary artery without angina pectoris     Recent labs reviewed.  " Continue atorvastatin.  Refill sent to pharmacy.  Lipids from recent cardiology appointment reviewed.  At goal in terms of LDL cholesterol suppression.         Benign prostatic hyperplasia without urinary obstruction     Ongoing symptoms.  Nocturia x3 while off of tamsulosin.  Concurrent erectile dysfunction.  We will see if tadalafil dose daily helps improve obstructive symptoms lower urinary tract as well as improve sexual function.  If not consider either resuming tamsulosin or switching to alfuzosin.  Check PSA today.         Relevant Orders    PSA, screen    RESOLVED: Cellulitis, unspecified cellulitis site     Resolved.  Unclear cause.          Essential hypertension     Blood pressure controlled today.  Continue losartan 50 mg.  Check basic metabolic panel.         Relevant Medications    losartan (COZAAR) 50 MG tablet    Other Relevant Orders    Basic metabolic panel  (Ca, Cl, CO2, Creat, Gluc, K, Na, BUN)    ALT    RESOLVED: Lower urinary tract symptoms (LUTS)    Mixed hyperlipidemia    Relevant Medications    atorvastatin (LIPITOR) 40 MG tablet    Other Relevant Orders    ALT    S/P ablation of atrial fibrillation    SCC (squamous cell carcinoma)     Other Visit Diagnoses       Encounter for Medicare annual wellness exam    -  Primary    Screening for prostate cancer        Relevant Orders    PSA, screen    Erectile dysfunction, unspecified erectile dysfunction type        Relevant Medications    tadalafil (CIALIS) 5 MG tablet          Patient has been advised of split billing requirements and indicates understanding: Yes    COUNSELING:  Reviewed preventive health counseling, as reflected in patient instructions       Regular exercise       Healthy diet/nutrition      BMI:   Estimated body mass index is 29.5 kg/m  as calculated from the following:    Height as of this encounter: 1.816 m (5' 11.5\").    Weight as of this encounter: 97.3 kg (214 lb 8 oz).   Weight management plan: Discussed healthy diet and " exercise guidelines      He reports that he has never smoked. He has never used smokeless tobacco.      Appropriate preventive services were discussed with this patient, including applicable screening as appropriate for fall prevention, nutrition, physical activity, Tobacco-use cessation, weight loss and cognition.  Checklist reviewing preventive services available has been given to the patient.    Reviewed patients plan of care and provided an AVS. The Basic Care Plan (routine screening as documented in Health Maintenance) for Benjamin meets the Care Plan requirement. This Care Plan has been established and reviewed with the Patient.      Venu Dugan MD  [facility]    Identified Health Risks:  I have reviewed Opioid Use Disorder and Substance Use Disorder risk factors and made any needed referrals. The patient was provided with written information regarding signs of hearing loss.

## 2023-12-04 NOTE — ASSESSMENT & PLAN NOTE
Recent labs reviewed.  Continue atorvastatin.  Refill sent to pharmacy.  Lipids from recent cardiology appointment reviewed.  At goal in terms of LDL cholesterol suppression.

## 2023-12-04 NOTE — ASSESSMENT & PLAN NOTE
Ongoing symptoms.  Nocturia x3 while off of tamsulosin.  Concurrent erectile dysfunction.  We will see if tadalafil dose daily helps improve obstructive symptoms lower urinary tract as well as improve sexual function.  If not consider either resuming tamsulosin or switching to alfuzosin.  Check PSA today.

## 2023-12-04 NOTE — PATIENT INSTRUCTIONS
Patient Education   Personalized Prevention Plan  You are due for the preventive services outlined below.  Your care team is available to assist you in scheduling these services.  If you have already completed any of these items, please share that information with your care team to update in your medical record.  Health Maintenance Due   Topic Date Due     ANNUAL REVIEW OF  ORDERS  Never done     Hepatitis C Screening  Never done     RSV VACCINE (Pregnancy & 60+) (1 - 1-dose 60+ series) Never done     Hearing Loss: Care Instructions  Overview     Hearing loss is a sudden or slow decrease in how well you hear. It can range from slight to profound. Permanent hearing loss can occur with aging. It also can happen when you are exposed long-term to loud noise. Examples include listening to loud music, riding motorcycles, or being around other loud machines.  Hearing loss can affect your work and home life. It can make you feel lonely or depressed. You may feel that you have lost your independence. But hearing aids and other devices can help you hear better and feel connected to others.  Follow-up care is a key part of your treatment and safety. Be sure to make and go to all appointments, and call your doctor if you are having problems. It's also a good idea to know your test results and keep a list of the medicines you take.  How can you care for yourself at home?  Avoid loud noises whenever possible. This helps keep your hearing from getting worse.  Always wear hearing protection around loud noises.  Wear a hearing aid as directed.  A professional can help you pick a hearing aid that will work best for you.  You can also get hearing aids over the counter for mild to moderate hearing loss.  Have hearing tests as your doctor suggests. They can show whether your hearing has changed. Your hearing aid may need to be adjusted.  Use other devices as needed. These may include:  Telephone amplifiers and hearing aids that can  "connect to a television, stereo, radio, or microphone.  Devices that use lights or vibrations. These alert you to the doorbell, a ringing telephone, or a baby monitor.  Television closed-captioning. This shows the words at the bottom of the screen. Most new TVs can do this.  TTY (text telephone). This lets you type messages back and forth on the telephone instead of talking or listening. These devices are also called TDD. When messages are typed on the keyboard, they are sent over the phone line to a receiving TTY. The message is shown on a monitor.  Use text messaging, social media, and email if it is hard for you to communicate by telephone.  Try to learn a listening technique called speechreading. It is not lipreading. You pay attention to people's gestures, expressions, posture, and tone of voice. These clues can help you understand what a person is saying. Face the person you are talking to, and have them face you. Make sure the lighting is good. You need to see the other person's face clearly.  Think about counseling if you need help to adjust to your hearing loss.  When should you call for help?  Watch closely for changes in your health, and be sure to contact your doctor if:    You think your hearing is getting worse.     You have new symptoms, such as dizziness or nausea.   Where can you learn more?  Go to https://www.[employer]!.net/patiented  Enter R798 in the search box to learn more about \"Hearing Loss: Care Instructions.\"  Current as of: February 28, 2023               Content Version: 13.8    7618-8982 [employer].   Care instructions adapted under license by your healthcare professional. If you have questions about a medical condition or this instruction, always ask your healthcare professional. [employer], [employer] disclaims any warranty or liability for your use of this information.         " No

## 2023-12-05 LAB
ALT SERPL W P-5'-P-CCNC: 34 U/L (ref 0–70)
ANION GAP SERPL CALCULATED.3IONS-SCNC: 8 MMOL/L (ref 7–15)
BUN SERPL-MCNC: 15 MG/DL (ref 8–23)
CALCIUM SERPL-MCNC: 9.8 MG/DL (ref 8.8–10.2)
CHLORIDE SERPL-SCNC: 100 MMOL/L (ref 98–107)
CREAT SERPL-MCNC: 0.82 MG/DL (ref 0.67–1.17)
DEPRECATED HCO3 PLAS-SCNC: 27 MMOL/L (ref 22–29)
EGFRCR SERPLBLD CKD-EPI 2021: >90 ML/MIN/1.73M2
GLUCOSE SERPL-MCNC: 93 MG/DL (ref 70–99)
POTASSIUM SERPL-SCNC: 4.7 MMOL/L (ref 3.4–5.3)
PSA SERPL DL<=0.01 NG/ML-MCNC: 2.98 NG/ML (ref 0–6.5)
SODIUM SERPL-SCNC: 135 MMOL/L (ref 135–145)

## 2024-01-31 ENCOUNTER — OFFICE VISIT (OUTPATIENT)
Dept: FAMILY MEDICINE | Facility: CLINIC | Age: 69
End: 2024-01-31
Payer: MEDICARE

## 2024-01-31 VITALS
RESPIRATION RATE: 12 BRPM | WEIGHT: 213.9 LBS | BODY MASS INDEX: 28.97 KG/M2 | HEIGHT: 72 IN | SYSTOLIC BLOOD PRESSURE: 138 MMHG | OXYGEN SATURATION: 98 % | HEART RATE: 66 BPM | DIASTOLIC BLOOD PRESSURE: 83 MMHG | TEMPERATURE: 98.6 F

## 2024-01-31 DIAGNOSIS — N40.0 BENIGN PROSTATIC HYPERPLASIA WITHOUT URINARY OBSTRUCTION: ICD-10-CM

## 2024-01-31 DIAGNOSIS — G44.89 OTHER HEADACHE SYNDROME: ICD-10-CM

## 2024-01-31 DIAGNOSIS — I10 ESSENTIAL HYPERTENSION: Primary | ICD-10-CM

## 2024-01-31 PROCEDURE — 99214 OFFICE O/P EST MOD 30 MIN: CPT | Performed by: FAMILY MEDICINE

## 2024-01-31 RX ORDER — TAMSULOSIN HYDROCHLORIDE 0.4 MG/1
0.4 CAPSULE ORAL DAILY
Qty: 90 CAPSULE | Refills: 3 | Status: SHIPPED | OUTPATIENT
Start: 2024-01-31

## 2024-01-31 RX ORDER — METOPROLOL TARTRATE 25 MG/1
25 TABLET, FILM COATED ORAL PRN
COMMUNITY
Start: 2023-12-11 | End: 2024-10-01

## 2024-01-31 NOTE — PROGRESS NOTES
Assessment & Plan   Problem List Items Addressed This Visit       Essential hypertension - Primary     Below goal of 140/90 on current regimen, but home numbers are reading slightly higher than they were while he was on Flomax.         Relevant Medications    metoprolol tartrate (LOPRESSOR) 25 MG tablet    Benign prostatic hyperplasia without urinary obstruction     Most significant change has is been no longer on Flomax.  Will restart at 0.4 mg daily.  Side effects precautions discussed.  May use Cialis as needed for erectile function.  Side effects precautions reviewed.         Relevant Medications    tamsulosin (FLOMAX) 0.4 MG capsule    Other headache syndrome     No signs of infection.  Blood pressure under good control.  Very well could be a factor of fatigue secondary to the fact that urination is taking longer now that he is off Flomax.         Relevant Medications    metoprolol tartrate (LOPRESSOR) 25 MG tablet         Regular exercise  See Patient Instructions      Subjective   Lamine is a 68 year old, presenting for the following health issues:  Follow Up (HTN--Having headaches routinely x1-2 weeks)      1/31/2024    12:25 PM   Additional Questions   Roomed by JACEY Mulligan   Accompanied by Spouse         1/31/2024    12:25 PM   Patient Reported Additional Medications   Patient reports taking the following new medications N/A     Patient having headaches 1 to 2 weeks routinely.  Her pressure feeling around the front of the head just above his eyebrows.  His blood pressure has been sliding slightly higher than previous but still below 140/90.  He has not missed any doses of his medication.  However there was a change in which he did attempt using Cialis daily for his BPH symptoms.  He stopped after 1 week secondary to significant headaches as well as muscle aches.  When he stopped it within 3 days those cleared.  Of note he did not restart his Flomax.  He is still getting up 3-4 times at night.  Off the Flomax  "and off the Cialis he is noticing again difficulty in starting his urinary stream and it takes longer to empty the bladder.  He has not had additional night wakings but he is having the same number they are just taking longer.  Otherwise there is been no other changes to his exercise regimen, medications, food intake, exercise, or hydration.    History of Present Illness       Hypertension: He presents for follow up of hypertension.  He does check blood pressure  regularly outside of the clinic. Outside blood pressures have been over 140/90. He does not follow a low salt diet.     Headaches:   Since the patient's last clinic visit, headaches are: no change  The patient is getting headaches:  Daily  He is able to do normal daily activities when he has a migraine.  The patient is taking the following rescue/relief medications:  Tylenol   Patient states \"I get some relief\" from the rescue/relief medications.   The patient is taking the following medications to prevent migraines:  No medications to prevent migraines  In the past 4 weeks, the patient has gone to an Urgent Care or Emergency Room 0 times times due to headaches.    Reason for visit:  Headaches and elevated bp  Symptom onset:  1-2 weeks ago    He eats 4 or more servings of fruits and vegetables daily.He consumes 1 sweetened beverage(s) daily.He exercises with enough effort to increase his heart rate 30 to 60 minutes per day.  He exercises with enough effort to increase his heart rate 6 days per week.   He is taking medications regularly.           Objective    /83 (BP Location: Left arm, Patient Position: Sitting, Cuff Size: Adult Large)   Pulse 66   Temp 98.6  F (37  C) (Oral)   Resp 12   Ht 1.816 m (5' 11.5\")   Wt 97 kg (213 lb 14.4 oz)   SpO2 98%   BMI 29.42 kg/m    Body mass index is 29.42 kg/m .  Physical Exam  Vitals and nursing note reviewed.   Constitutional:       General: He is not in acute distress.     Appearance: Normal appearance. " He is not ill-appearing.   HENT:      Head: Normocephalic and atraumatic.      Right Ear: Tympanic membrane, ear canal and external ear normal.      Left Ear: Tympanic membrane, ear canal and external ear normal.      Nose: Nose normal.      Mouth/Throat:      Pharynx: No oropharyngeal exudate or posterior oropharyngeal erythema.   Eyes:      Extraocular Movements: Extraocular movements intact.      Conjunctiva/sclera: Conjunctivae normal.   Pulmonary:      Effort: Pulmonary effort is normal.      Breath sounds: No wheezing, rhonchi or rales.   Musculoskeletal:      Cervical back: Normal range of motion.      Right lower leg: No edema.      Left lower leg: No edema.   Lymphadenopathy:      Cervical: No cervical adenopathy.   Skin:     Capillary Refill: Capillary refill takes less than 2 seconds.   Neurological:      Mental Status: He is alert and oriented to person, place, and time.   Psychiatric:         Attention and Perception: Attention normal.         Mood and Affect: Mood normal.         Speech: Speech normal.         Thought Content: Thought content normal.              Signed Electronically by: Chandler Santillan DO

## 2024-02-01 NOTE — ASSESSMENT & PLAN NOTE
Below goal of 140/90 on current regimen, but home numbers are reading slightly higher than they were while he was on Flomax.

## 2024-02-01 NOTE — ASSESSMENT & PLAN NOTE
Most significant change has is been no longer on Flomax.  Will restart at 0.4 mg daily.  Side effects precautions discussed.  May use Cialis as needed for erectile function.  Side effects precautions reviewed.

## 2024-02-01 NOTE — ASSESSMENT & PLAN NOTE
No signs of infection.  Blood pressure under good control.  Very well could be a factor of fatigue secondary to the fact that urination is taking longer now that he is off Flomax.

## 2024-02-27 ENCOUNTER — TRANSFERRED RECORDS (OUTPATIENT)
Dept: HEALTH INFORMATION MANAGEMENT | Facility: CLINIC | Age: 69
End: 2024-02-27
Payer: MEDICARE

## 2024-04-08 ENCOUNTER — OFFICE VISIT (OUTPATIENT)
Dept: FAMILY MEDICINE | Facility: CLINIC | Age: 69
End: 2024-04-08
Payer: MEDICARE

## 2024-04-08 ENCOUNTER — HOSPITAL ENCOUNTER (EMERGENCY)
Facility: HOSPITAL | Age: 69
Discharge: HOME | End: 2024-04-08
Attending: EMERGENCY MEDICINE | Admitting: EMERGENCY MEDICINE
Payer: MEDICARE

## 2024-04-08 VITALS
RESPIRATION RATE: 17 BRPM | OXYGEN SATURATION: 99 % | HEIGHT: 72 IN | WEIGHT: 215.5 LBS | TEMPERATURE: 98.4 F | DIASTOLIC BLOOD PRESSURE: 64 MMHG | HEART RATE: 63 BPM | BODY MASS INDEX: 29.19 KG/M2 | SYSTOLIC BLOOD PRESSURE: 122 MMHG

## 2024-04-08 VITALS
TEMPERATURE: 97.8 F | HEART RATE: 55 BPM | DIASTOLIC BLOOD PRESSURE: 86 MMHG | RESPIRATION RATE: 16 BRPM | OXYGEN SATURATION: 99 % | SYSTOLIC BLOOD PRESSURE: 142 MMHG

## 2024-04-08 DIAGNOSIS — I25.10 ATHEROSCLEROSIS OF NATIVE CORONARY ARTERY OF NATIVE HEART WITHOUT ANGINA PECTORIS: ICD-10-CM

## 2024-04-08 DIAGNOSIS — R00.2 PALPITATIONS: ICD-10-CM

## 2024-04-08 DIAGNOSIS — I49.3 PVC'S (PREMATURE VENTRICULAR CONTRACTIONS): ICD-10-CM

## 2024-04-08 DIAGNOSIS — I44.0 1ST DEGREE AV BLOCK: ICD-10-CM

## 2024-04-08 DIAGNOSIS — I10 ESSENTIAL HYPERTENSION: ICD-10-CM

## 2024-04-08 DIAGNOSIS — R07.89 ATYPICAL CHEST PAIN: ICD-10-CM

## 2024-04-08 DIAGNOSIS — R07.9 CHEST PAIN, UNSPECIFIED TYPE: Primary | ICD-10-CM

## 2024-04-08 LAB
ALBUMIN SERPL BCG-MCNC: 4.2 G/DL (ref 3.5–5.2)
ALP SERPL-CCNC: 69 U/L (ref 40–150)
ALT SERPL W P-5'-P-CCNC: 34 U/L (ref 0–70)
ANION GAP SERPL CALCULATED.3IONS-SCNC: 10 MMOL/L (ref 7–15)
AST SERPL W P-5'-P-CCNC: 32 U/L (ref 0–45)
ATRIAL RATE - MUSE: 64 BPM
BASOPHILS # BLD AUTO: 0 10E3/UL (ref 0–0.2)
BASOPHILS NFR BLD AUTO: 1 %
BILIRUB DIRECT SERPL-MCNC: 0.36 MG/DL (ref 0–0.3)
BILIRUB SERPL-MCNC: 1.8 MG/DL
BUN SERPL-MCNC: 15.9 MG/DL (ref 8–23)
CALCIUM SERPL-MCNC: 9.4 MG/DL (ref 8.8–10.2)
CHLORIDE SERPL-SCNC: 102 MMOL/L (ref 98–107)
CREAT SERPL-MCNC: 0.85 MG/DL (ref 0.67–1.17)
DEPRECATED HCO3 PLAS-SCNC: 24 MMOL/L (ref 22–29)
DIASTOLIC BLOOD PRESSURE - MUSE: NORMAL MMHG
EGFRCR SERPLBLD CKD-EPI 2021: >90 ML/MIN/1.73M2
EOSINOPHIL # BLD AUTO: 0.2 10E3/UL (ref 0–0.7)
EOSINOPHIL NFR BLD AUTO: 3 %
ERYTHROCYTE [DISTWIDTH] IN BLOOD BY AUTOMATED COUNT: 11.9 % (ref 10–15)
GLUCOSE SERPL-MCNC: 101 MG/DL (ref 70–99)
HCT VFR BLD AUTO: 50.7 % (ref 40–53)
HGB BLD-MCNC: 17.2 G/DL (ref 13.3–17.7)
HOLD SPECIMEN: NORMAL
HOLD SPECIMEN: NORMAL
IMM GRANULOCYTES # BLD: 0 10E3/UL
IMM GRANULOCYTES NFR BLD: 0 %
INTERPRETATION ECG - MUSE: NORMAL
LIPASE SERPL-CCNC: 14 U/L (ref 13–60)
LYMPHOCYTES # BLD AUTO: 1.9 10E3/UL (ref 0.8–5.3)
LYMPHOCYTES NFR BLD AUTO: 30 %
MAGNESIUM SERPL-MCNC: 2 MG/DL (ref 1.7–2.3)
MCH RBC QN AUTO: 30 PG (ref 26.5–33)
MCHC RBC AUTO-ENTMCNC: 33.9 G/DL (ref 31.5–36.5)
MCV RBC AUTO: 89 FL (ref 78–100)
MONOCYTES # BLD AUTO: 0.6 10E3/UL (ref 0–1.3)
MONOCYTES NFR BLD AUTO: 9 %
NEUTROPHILS # BLD AUTO: 3.7 10E3/UL (ref 1.6–8.3)
NEUTROPHILS NFR BLD AUTO: 57 %
NRBC # BLD AUTO: 0 10E3/UL
NRBC BLD AUTO-RTO: 0 /100
NT-PROBNP SERPL-MCNC: 110 PG/ML (ref 0–900)
P AXIS - MUSE: 47 DEGREES
PLATELET # BLD AUTO: 169 10E3/UL (ref 150–450)
POTASSIUM SERPL-SCNC: 4.3 MMOL/L (ref 3.4–5.3)
PR INTERVAL - MUSE: 218 MS
PROT SERPL-MCNC: 6.6 G/DL (ref 6.4–8.3)
QRS DURATION - MUSE: 86 MS
QT - MUSE: 456 MS
QTC - MUSE: 470 MS
R AXIS - MUSE: 0 DEGREES
RBC # BLD AUTO: 5.73 10E6/UL (ref 4.4–5.9)
SODIUM SERPL-SCNC: 136 MMOL/L (ref 135–145)
SYSTOLIC BLOOD PRESSURE - MUSE: NORMAL MMHG
T AXIS - MUSE: 25 DEGREES
TROPONIN T SERPL HS-MCNC: 12 NG/L
TSH SERPL DL<=0.005 MIU/L-ACNC: 1.18 UIU/ML (ref 0.3–4.2)
VENTRICULAR RATE- MUSE: 64 BPM
WBC # BLD AUTO: 6.3 10E3/UL (ref 4–11)

## 2024-04-08 PROCEDURE — 99284 EMERGENCY DEPT VISIT MOD MDM: CPT

## 2024-04-08 PROCEDURE — 36415 COLL VENOUS BLD VENIPUNCTURE: CPT | Performed by: EMERGENCY MEDICINE

## 2024-04-08 PROCEDURE — 93010 ELECTROCARDIOGRAM REPORT: CPT | Mod: OFF | Performed by: INTERNAL MEDICINE

## 2024-04-08 PROCEDURE — 84443 ASSAY THYROID STIM HORMONE: CPT | Performed by: EMERGENCY MEDICINE

## 2024-04-08 PROCEDURE — 83735 ASSAY OF MAGNESIUM: CPT | Performed by: EMERGENCY MEDICINE

## 2024-04-08 PROCEDURE — 85025 COMPLETE CBC W/AUTO DIFF WBC: CPT | Performed by: EMERGENCY MEDICINE

## 2024-04-08 PROCEDURE — 99214 OFFICE O/P EST MOD 30 MIN: CPT | Mod: 25 | Performed by: NURSE PRACTITIONER

## 2024-04-08 PROCEDURE — 93005 ELECTROCARDIOGRAM TRACING: CPT | Performed by: NURSE PRACTITIONER

## 2024-04-08 PROCEDURE — 83880 ASSAY OF NATRIURETIC PEPTIDE: CPT | Mod: GZ | Performed by: EMERGENCY MEDICINE

## 2024-04-08 PROCEDURE — 82248 BILIRUBIN DIRECT: CPT | Performed by: EMERGENCY MEDICINE

## 2024-04-08 PROCEDURE — 93005 ELECTROCARDIOGRAM TRACING: CPT

## 2024-04-08 PROCEDURE — 84484 ASSAY OF TROPONIN QUANT: CPT | Performed by: EMERGENCY MEDICINE

## 2024-04-08 PROCEDURE — 83690 ASSAY OF LIPASE: CPT | Performed by: EMERGENCY MEDICINE

## 2024-04-08 PROCEDURE — 80053 COMPREHEN METABOLIC PANEL: CPT | Performed by: EMERGENCY MEDICINE

## 2024-04-08 ASSESSMENT — COLUMBIA-SUICIDE SEVERITY RATING SCALE - C-SSRS
2. HAVE YOU ACTUALLY HAD ANY THOUGHTS OF KILLING YOURSELF IN THE PAST MONTH?: NO
1. IN THE PAST MONTH, HAVE YOU WISHED YOU WERE DEAD OR WISHED YOU COULD GO TO SLEEP AND NOT WAKE UP?: NO
6. HAVE YOU EVER DONE ANYTHING, STARTED TO DO ANYTHING, OR PREPARED TO DO ANYTHING TO END YOUR LIFE?: NO

## 2024-04-08 ASSESSMENT — ACTIVITIES OF DAILY LIVING (ADL)
ADLS_ACUITY_SCORE: 33
ADLS_ACUITY_SCORE: 33

## 2024-04-08 ASSESSMENT — ENCOUNTER SYMPTOMS
COUGH: 0
HEARTBURN: 0
NAUSEA: 0
FEVER: 0
VOMITING: 0
SHORTNESS OF BREATH: 0
DIAPHORESIS: 0

## 2024-04-08 NOTE — ED TRIAGE NOTES
Pt presents with left anterior chest pain that began 7 days ago along with PVCs. Pt had begun a strength training workout and was unsure if it was that, nut he ceased doing them since Friday and pain has continued. Pt was seen in urgent care and sent here for cardiac workup. Hx of Afib and ablation.     Triage Assessment (Adult)       Row Name 04/08/24 1528          Triage Assessment    Airway WDL WDL        Respiratory WDL    Respiratory WDL WDL        Skin Circulation/Temperature WDL    Skin Circulation/Temperature WDL WDL        Cardiac WDL    Cardiac WDL X;chest pain        Chest Pain Assessment    Chest Pain Location anterior chest, left        Peripheral/Neurovascular WDL    Peripheral Neurovascular WDL WDL        Cognitive/Neuro/Behavioral WDL    Cognitive/Neuro/Behavioral WDL WDL
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
## 2024-04-08 NOTE — ED NOTES
Expected Patient Referral to ED  2:25 PM    Referring Clinic/Provider:  Walk-in clinic    Reason for referral/Clinical facts:  Chest pain rule out, on Xarelto for a-fib, no ST changes on EKG    Recommendations provided:  Send to ED for further evaluation    Caller was informed that this institution does possess the capabilities and/or resources to provide for patient and should be transferred to our facility.    Discussed that if direct admit is sought and any hurdles are encountered, this ED would be happy to see the patient and evaluate.    Informed caller that recommendations provided are recommendations based only on the facts provided and that they responsible to accept or reject the advice, or to seek a formal in person consultation as needed and that this ED will see/treat patient should they arrive.      Antonino Martines MD  [facility] EMERGENCY DEPARTMENT  [address] [place] [id number]  [phone]     Antonino Martines MD  04/08/24 1420

## 2024-04-08 NOTE — PROGRESS NOTES
Assessment & Plan     Chest pain, unspecified type    - EKG 12-lead, tracing only    Essential hypertension      Atherosclerosis of native coronary artery of native heart without angina pectoris      PVC's (premature ventricular contractions)      1st degree AV block       Patient with history of CAD, hypertension and PVCs as well as atrial fib with ablation in the past with increased PVC frequency.  He happens to have a monitor at home I can print out a rhythm strip.  He provided rhythm strip which showed PVCs.  Patient's EKG here shows PVCs with first-degree AV block, unchanged from previous done in October 2023.    Of most concern has been 3 to 4-day history of diffuse left-sided dull chest pain rated about 2-3 out of 10.  Does not seem exertional and he has been able to tolerate his usual treadmill workout.  Has been doing upper body strength training, but has not felt tender in the affected area.  Not short of breath, not diaphoretic.  Says he knows what the PVCs feel like in the chest pain is not only due to PVCs as it is constant.    Did speak to ADS provider about chest pain rule out for patient in the ADS.  ADS declined due to timeframe with likely needing troponins twice.  Discussed with patient and wife.  Recommended ER for chest pain rule out due to persistence of symptoms.  They will go to [facility].  Did speak to Dr. Martines in the ER.         30 minutes spent by me on the date of the encounter doing chart review, review of test results, patient visit, documentation, and discussion with family         No follow-ups on file.    Asha Williamson [facility] SAYRA Raman is a 68 year old male who presents to clinic today for the following health issues:  Chief Complaint   Patient presents with    Chest Pain     Irregular heartbeat for a while off and on ( was seen in  in sept for PVC), past 4-5 days had more frequent PVC, last week started dull upper Lt side  "chest pain off and on, has tried to do some strength workout in the past couple of months (a lot of upper body workout)     HPI    Patient with a known history of PVCs with increased frequency of presumed PVCs last week associated with intermittent left upper chest pain.    When PVCs became more frequent, was having trouble taking blood pressure due to \"missed beats.\" Chest pain first time about 4-5  days ago.  Described as mild, diffuse on the left.  Feels inside - not tender to touch.  Comes and goes. Had it \"a good part of the day\" 3/4 days.  Lasts for 6 hours or so at a time.  Not related to activity.  Has been exercising on treadmill, same as last 10-12 years.  Walks with steep incline.  Does 45 minutes - 1 hour 5 days/week.  Activity hasn't been triggering CP.  Was told to back off on cardio and do strength training.  Started upper body strength in January.  Using weights and bands.  Stopped doing that 3  days ago.      Does have a 6-lead EKG that he was able to print out at home that showed, based on my evaluation, PVCs every 3-4 beats.    Currently rating the left-sided chest discomfort at a 2 out of 10.    Hx intermittent afib  with ablation.  On xarelto.  Was cardioverted several times prior to that.            Review of Systems   Constitutional:  Negative for diaphoresis and fever.   HENT:  Negative for congestion.    Respiratory:  Negative for cough and shortness of breath.    Gastrointestinal:  Negative for heartburn, nausea and vomiting.           Objective    BP (!) 142/86   Pulse 55   Temp 97.8  F (36.6  C) (Oral)   Resp 16   SpO2 99%   Physical Exam  Constitutional:       Appearance: He is well-developed.   HENT:      Right Ear: External ear normal.      Left Ear: External ear normal.   Eyes:      General:         Right eye: No discharge.         Left eye: No discharge.      Conjunctiva/sclera: Conjunctivae normal.   Cardiovascular:      Rate and Rhythm: Normal rate and regular rhythm.      " Pulses: Normal pulses.      Heart sounds: Normal heart sounds.      Comments: Occasional skipped beats noted  Pulmonary:      Effort: Pulmonary effort is normal.      Breath sounds: Normal breath sounds.   Musculoskeletal:         General: No tenderness (No chest wall tenderness). Normal range of motion.   Skin:     General: Skin is warm.   Neurological:      Mental Status: He is alert and oriented to person, place, and time.   Psychiatric:         Mood and Affect: Mood normal.         Behavior: Behavior normal.         Thought Content: Thought content normal.         Judgment: Judgment normal.                Results for orders placed or performed in visit on 04/08/24   EKG 12-lead, tracing only     Status: None (Preliminary result)   Result Value Ref Range    Systolic Blood Pressure  mmHg    Diastolic Blood Pressure  mmHg    Ventricular Rate 64 BPM    Atrial Rate 64 BPM    MD Interval 218 ms    QRS Duration 86 ms     ms    QTc 470 ms    P Axis 47 degrees    R AXIS 0 degrees    T Axis 25 degrees    Interpretation ECG       Sinus rhythm with 1st degree A-V block with occasional Premature ventricular complexes  Otherwise normal ECG  When compared with ECG of 18-SEP-2023 15:10,  No significant change was found       EKG reviewed by Asha Williamson CNP  Normal sinus rhythm without acute change.  1 PVC noted and borderline first-degree AV block.  EK G similar to previous done in September, 2023 with fewer PVCs.  MD interval at that time was similar as well.

## 2024-04-28 DIAGNOSIS — I48.0 PAROXYSMAL ATRIAL FIBRILLATION (H): ICD-10-CM

## 2024-04-29 RX ORDER — RIVAROXABAN 20 MG/1
TABLET, FILM COATED ORAL
Qty: 90 TABLET | Refills: 0 | Status: SHIPPED | OUTPATIENT
Start: 2024-04-29 | End: 2024-07-24

## 2024-04-30 ENCOUNTER — OFFICE VISIT (OUTPATIENT)
Dept: FAMILY MEDICINE | Facility: CLINIC | Age: 69
End: 2024-04-30
Payer: MEDICARE

## 2024-04-30 VITALS
OXYGEN SATURATION: 97 % | RESPIRATION RATE: 16 BRPM | BODY MASS INDEX: 29.31 KG/M2 | DIASTOLIC BLOOD PRESSURE: 83 MMHG | TEMPERATURE: 97.8 F | HEIGHT: 72 IN | SYSTOLIC BLOOD PRESSURE: 132 MMHG | WEIGHT: 216.4 LBS | HEART RATE: 64 BPM

## 2024-04-30 DIAGNOSIS — I25.10 ATHEROSCLEROSIS OF NATIVE CORONARY ARTERY OF NATIVE HEART WITHOUT ANGINA PECTORIS: Primary | ICD-10-CM

## 2024-04-30 DIAGNOSIS — Z98.890 S/P ABLATION OF ATRIAL FIBRILLATION: ICD-10-CM

## 2024-04-30 DIAGNOSIS — Z86.79 S/P ABLATION OF ATRIAL FIBRILLATION: ICD-10-CM

## 2024-04-30 DIAGNOSIS — R07.89 ATYPICAL CHEST PAIN: ICD-10-CM

## 2024-04-30 DIAGNOSIS — N40.0 BENIGN PROSTATIC HYPERPLASIA WITHOUT URINARY OBSTRUCTION: ICD-10-CM

## 2024-04-30 DIAGNOSIS — Z12.11 SCREENING FOR COLON CANCER: ICD-10-CM

## 2024-04-30 PROCEDURE — 99214 OFFICE O/P EST MOD 30 MIN: CPT | Performed by: FAMILY MEDICINE

## 2024-04-30 RX ORDER — RESPIRATORY SYNCYTIAL VIRUS VACCINE 120MCG/0.5
0.5 KIT INTRAMUSCULAR ONCE
Qty: 1 EACH | Refills: 0 | Status: CANCELLED | OUTPATIENT
Start: 2024-04-30 | End: 2024-04-30

## 2024-04-30 NOTE — ASSESSMENT & PLAN NOTE
This patient had a episode of atypical chest pain 3 weeks ago.  This corresponds to increased upper body strength training.  EKG labs including troponin were negative in the emergency department setting.  He has reduced his exercise and his symptoms have mostly resolved.  Interestingly he has had a run of atrial fibrillation (first since undergoing an ablation at [facility]) as well as more PVCs.  Given this constellation of potentially worrisome heart related symptoms I think he does need additional evaluation.  - Stress echocardiogram ordered (has had allergy urticarial rash with nuclear perfusion studies in the past)  - Referral back to cardiology.  We will try to get him on Dr. Lundy schedule later this week if there is still an opening.  - Once the above data has been completed he can resume his bands and upper body workouts.

## 2024-04-30 NOTE — PROGRESS NOTES
Assessment & Plan   Problem List Items Addressed This Visit       Atherosclerotic heart disease of native coronary artery without angina pectoris - Primary     This patient had a episode of atypical chest pain 3 weeks ago.  This corresponds to increased upper body strength training.  EKG labs including troponin were negative in the emergency department setting.  He has reduced his exercise and his symptoms have mostly resolved.  Interestingly he has had a run of atrial fibrillation (first since undergoing an ablation at [place]) as well as more PVCs.  Given this constellation of potentially worrisome heart related symptoms I think he does need additional evaluation.  - Stress echocardiogram ordered (has had allergy urticarial rash with nuclear perfusion studies in the past)  - Referral back to cardiology.  We will try to get him on Dr. Lundy schedule later this week if there is still an opening.  - Once the above data has been completed he can resume his bands and upper body workouts.         Relevant Orders    Echocardiogram Exercise Stress    Adult Cardiology Eval  Referral    Benign prostatic hyperplasia without urinary obstruction     Side effects with tadalafil.  Back on flomax.          S/P ablation of atrial fibrillation     Other Visit Diagnoses       Atypical chest pain        Screening for colon cancer        Relevant Orders    Colonoscopy Screening  Referral            MED REC REQUIRED  Post Medication Reconciliation Status:  Patient was not discharged from an inpatient facility or TCU      Ese Raman is a 68 year old, presenting for the following health issues:  ER F/U (Follow-up from [facility] Emergency Department x 4/8/24 for Chest Pain and Palpitations)        4/30/2024     8:37 AM   Additional Questions   Roomed by xl     ED follow-up:   - three weeks ago he experience dull pain and was seen in the ED.   - he has corresponding increase in PVCs.     - labs and ECG were within normal limits in ED.    - context: strength training with bands. Has increased his load recently.  This includes bench press.     - pain better since discontinuing upper body exercises.    - he had atrial fibrillation episode last            Objective    /83 (BP Location: Left arm, Patient Position: Sitting, Cuff Size: Adult Large)   Pulse 64   Temp 97.8  F (36.6  C) (Oral)   Resp 16   Ht 1.829 m (6')   Wt 98.2 kg (216 lb 6.4 oz)   SpO2 97%   BMI 29.35 kg/m    Body mass index is 29.35 kg/m .  Physical Exam  Nursing note reviewed.   Constitutional:       General: He is not in acute distress.     Appearance: Normal appearance. He is not ill-appearing.   HENT:      Head: Normocephalic and atraumatic.      Right Ear: External ear normal.      Left Ear: External ear normal.   Eyes:      General: No scleral icterus.     Extraocular Movements: Extraocular movements intact.      Conjunctiva/sclera: Conjunctivae normal.   Cardiovascular:      Rate and Rhythm: Normal rate and regular rhythm.      Heart sounds: Normal heart sounds. No murmur heard.     No friction rub. No gallop.   Pulmonary:      Effort: Pulmonary effort is normal. No respiratory distress.      Breath sounds: Normal breath sounds. No wheezing or rales.   Musculoskeletal:         General: No swelling. Normal range of motion.   Skin:     General: Skin is warm.      Coloration: Skin is not jaundiced.      Findings: No rash.   Neurological:      General: No focal deficit present.      Mental Status: He is alert and oriented to person, place, and time. Mental status is at baseline.   Psychiatric:         Attention and Perception: Attention normal.         Mood and Affect: Mood normal.         Speech: Speech normal.         Thought Content: Thought content normal.        Notes reviewed.  ECG reviewed.               Signed Electronically by: Venu Dugan MD

## 2024-05-13 ENCOUNTER — HOSPITAL ENCOUNTER (OUTPATIENT)
Dept: CARDIOLOGY | Facility: CLINIC | Age: 69
Discharge: HOME | End: 2024-05-13
Attending: FAMILY MEDICINE | Admitting: FAMILY MEDICINE
Payer: MEDICARE

## 2024-05-13 DIAGNOSIS — I25.10 ATHEROSCLEROSIS OF NATIVE CORONARY ARTERY OF NATIVE HEART WITHOUT ANGINA PECTORIS: ICD-10-CM

## 2024-05-13 PROCEDURE — 93352 ADMIN ECG CONTRAST AGENT: CPT | Performed by: INTERNAL MEDICINE

## 2024-05-13 PROCEDURE — 93321 DOPPLER ECHO F-UP/LMTD STD: CPT | Mod: 26 | Performed by: INTERNAL MEDICINE

## 2024-05-13 PROCEDURE — 255N000002 HC RX 255 OP 636: Performed by: FAMILY MEDICINE

## 2024-05-13 PROCEDURE — 93350 STRESS TTE ONLY: CPT | Mod: 26 | Performed by: INTERNAL MEDICINE

## 2024-05-13 PROCEDURE — 93325 DOPPLER ECHO COLOR FLOW MAPG: CPT | Mod: 26 | Performed by: INTERNAL MEDICINE

## 2024-05-13 PROCEDURE — 93018 CV STRESS TEST I&R ONLY: CPT | Performed by: INTERNAL MEDICINE

## 2024-05-13 PROCEDURE — 93016 CV STRESS TEST SUPVJ ONLY: CPT | Performed by: INTERNAL MEDICINE

## 2024-05-13 RX ADMIN — PERFLUTREN 3 ML: 6.52 INJECTION, SUSPENSION INTRAVENOUS at 13:22

## 2024-05-26 ENCOUNTER — HOSPITAL ENCOUNTER (EMERGENCY)
Facility: CLINIC | Age: 69
Discharge: HOME | End: 2024-05-26
Attending: EMERGENCY MEDICINE | Admitting: EMERGENCY MEDICINE
Payer: MEDICARE

## 2024-05-26 VITALS
TEMPERATURE: 97.5 F | RESPIRATION RATE: 17 BRPM | HEIGHT: 72 IN | DIASTOLIC BLOOD PRESSURE: 95 MMHG | SYSTOLIC BLOOD PRESSURE: 163 MMHG | BODY MASS INDEX: 29.12 KG/M2 | WEIGHT: 215 LBS | HEART RATE: 65 BPM | OXYGEN SATURATION: 98 %

## 2024-05-26 DIAGNOSIS — Z86.79 HISTORY OF ATRIAL FIBRILLATION: ICD-10-CM

## 2024-05-26 LAB
ATRIAL RATE - MUSE: 61 BPM
DIASTOLIC BLOOD PRESSURE - MUSE: NORMAL MMHG
INTERPRETATION ECG - MUSE: NORMAL
P AXIS - MUSE: 55 DEGREES
PR INTERVAL - MUSE: 230 MS
QRS DURATION - MUSE: 96 MS
QT - MUSE: 410 MS
QTC - MUSE: 412 MS
R AXIS - MUSE: 37 DEGREES
SYSTOLIC BLOOD PRESSURE - MUSE: NORMAL MMHG
T AXIS - MUSE: 45 DEGREES
VENTRICULAR RATE- MUSE: 61 BPM

## 2024-05-26 PROCEDURE — 93005 ELECTROCARDIOGRAM TRACING: CPT | Performed by: EMERGENCY MEDICINE

## 2024-05-26 PROCEDURE — 99283 EMERGENCY DEPT VISIT LOW MDM: CPT

## 2024-05-26 ASSESSMENT — COLUMBIA-SUICIDE SEVERITY RATING SCALE - C-SSRS
2. HAVE YOU ACTUALLY HAD ANY THOUGHTS OF KILLING YOURSELF IN THE PAST MONTH?: NO
6. HAVE YOU EVER DONE ANYTHING, STARTED TO DO ANYTHING, OR PREPARED TO DO ANYTHING TO END YOUR LIFE?: NO
1. IN THE PAST MONTH, HAVE YOU WISHED YOU WERE DEAD OR WISHED YOU COULD GO TO SLEEP AND NOT WAKE UP?: NO

## 2024-05-26 ASSESSMENT — ACTIVITIES OF DAILY LIVING (ADL): ADLS_ACUITY_SCORE: 33

## 2024-05-27 NOTE — ED TRIAGE NOTES
Pt presents to the ED with c/o episode of A-Fib while at home. Pt has a monitor. In past pt has been cardioverted. Hx of ablation. Denies CP or SOB.
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
## 2024-05-29 ENCOUNTER — PATIENT OUTREACH (OUTPATIENT)
Dept: CARE COORDINATION | Facility: CLINIC | Age: 69
End: 2024-05-29
Payer: MEDICARE

## 2024-05-29 NOTE — PROGRESS NOTES
[facility] Care Resource Center Contact  [facility]/Voicemail     Clinical Data: Care Coordination ED-sourced Outreach-     Outreach attempted x 2.  Left message on patient's voicemail, providing [facility]'s 24/7 scheduling and nurse triage phone number [phone] ([phone]) for questions/concerns and/or to schedule an appt with an [facility] provider.      Care Coordination introduction letter with explanation of Clinic Care Coordination services sent to patient via [employer]. Clinic Care Coordination services remain available via referral if needed.    Plan: [facility] will do no further outreaches at this time.       CAMILLA Funez  Connected Care Resource [place], [facility]    *Connected Care Resource Team does NOT follow patient ongoing. Referrals are identified based on internal discharge reports and the outreach is to ensure patient has an understanding of their discharge instructions.

## 2024-05-29 NOTE — LETTER
Benjamin Hooper  [address] [address] [place] [id number]    Dear Benjamin Hooper,      I am a team member within the Connected Care Resource Center with [facility]. I recently tried to reach you to ensure you were doing well following a recent visit within our health system. I also wanted to take this chance to introduce Clinic Care Coordination.     Below is a description of Clinic Care Coordination and how this team can further assist you:       The Clinic Care Coordination team is made up of a Registered Nurse, , Financial Resource Worker, and a Community Health Worker who understand and can help navigate the health care system. The goal of clinic care coordination is to help you manage your health, improve access to care, and achieve optimal health outcomes. They work alongside your provider to assist you in determining your health and social needs, obtain health care and community resources, and provide you with necessary information and education. Clinic Care Coordination can work with you through any barriers and develop a care plan that helps coordinate and strengthen the relationship between you and your care team.    If you wish to connect with the Clinic Care Coordination Team, please let your [facility] Care Provider or Clinic Care Team know and they can place a referral. The Clinic Care Coordination team will then reach out by phone to further support you.    We are focused on providing you with the highest-quality healthcare experience possible.    Sincerely,   Your care team with [facility]

## 2024-05-31 ENCOUNTER — TELEPHONE (OUTPATIENT)
Dept: FAMILY MEDICINE | Facility: CLINIC | Age: 69
End: 2024-05-31
Payer: MEDICARE

## 2024-05-31 DIAGNOSIS — E78.2 MIXED HYPERLIPIDEMIA: ICD-10-CM

## 2024-05-31 RX ORDER — ATORVASTATIN CALCIUM 40 MG/1
40 TABLET, FILM COATED ORAL DAILY
Qty: 90 TABLET | Refills: 2 | Status: SHIPPED | OUTPATIENT
Start: 2024-05-31

## 2024-05-31 NOTE — TELEPHONE ENCOUNTER
General Call    Contacts         Type Contact Phone/Fax    05/31/2024 08:20 AM CDT Phone (Incoming) Lamine Hooper (Self) [phone] (M)          Reason for Call:  Prescription Request    What are your questions or concerns:  Patient calling regarding atorvastatin prescription. He reports he should be on 40mg, 1 tablet daily. His dose was temporarily reduced in April of 2023 and the 12/4/23 prescription was refilled from that reported dose. Pharmacy will need new prescription. Please advise.     Date of last appointment with provider: 4/30/2024    Could we send this information to you in [facility] or would you prefer to receive a phone call?:   Patient would prefer a phone call   Okay to leave a detailed message?: Yes at Cell number on file:    Telephone Information:   Mobile [phone]

## 2024-06-06 ENCOUNTER — OFFICE VISIT (OUTPATIENT)
Dept: CARDIOLOGY | Facility: CLINIC | Age: 69
End: 2024-06-06
Payer: MEDICARE

## 2024-06-06 VITALS
RESPIRATION RATE: 16 BRPM | WEIGHT: 216.9 LBS | BODY MASS INDEX: 29.38 KG/M2 | HEART RATE: 70 BPM | SYSTOLIC BLOOD PRESSURE: 146 MMHG | HEIGHT: 72 IN | OXYGEN SATURATION: 97 % | TEMPERATURE: 98.5 F | DIASTOLIC BLOOD PRESSURE: 85 MMHG

## 2024-06-06 DIAGNOSIS — E78.5 DYSLIPIDEMIA: ICD-10-CM

## 2024-06-06 DIAGNOSIS — I48.91 ATRIAL FIBRILLATION, UNSPECIFIED TYPE (H): ICD-10-CM

## 2024-06-06 DIAGNOSIS — I25.10 ATHEROSCLEROSIS OF NATIVE CORONARY ARTERY OF NATIVE HEART WITHOUT ANGINA PECTORIS: ICD-10-CM

## 2024-06-06 DIAGNOSIS — I10 HYPERTENSION, UNSPECIFIED TYPE: Primary | ICD-10-CM

## 2024-06-06 PROCEDURE — 99214 OFFICE O/P EST MOD 30 MIN: CPT | Performed by: INTERNAL MEDICINE

## 2024-06-06 RX ORDER — METOPROLOL SUCCINATE 25 MG/1
25 TABLET, EXTENDED RELEASE ORAL DAILY
Qty: 90 TABLET | Refills: 3 | Status: SHIPPED | OUTPATIENT
Start: 2024-06-06 | End: 2024-06-28
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
## 2024-06-14 ENCOUNTER — TRANSFERRED RECORDS (OUTPATIENT)
Dept: HEALTH INFORMATION MANAGEMENT | Facility: CLINIC | Age: 69
End: 2024-06-14
Payer: MEDICARE

## 2024-06-20 ENCOUNTER — ALLIED HEALTH/NURSE VISIT (OUTPATIENT)
Dept: CARDIOLOGY | Facility: CLINIC | Age: 69
End: 2024-06-20
Attending: INTERNAL MEDICINE
Payer: MEDICARE

## 2024-06-20 DIAGNOSIS — I48.91 ATRIAL FIBRILLATION, UNSPECIFIED TYPE (H): ICD-10-CM

## 2024-06-20 DIAGNOSIS — I49.3 PVC'S (PREMATURE VENTRICULAR CONTRACTIONS): ICD-10-CM

## 2024-06-20 PROCEDURE — 93242 EXT ECG>48HR<7D RECORDING: CPT | Performed by: INTERNAL MEDICINE

## 2024-06-20 NOTE — PROGRESS NOTES
Benjamin Hooper arrived here on 6/20/2024 3:34 PM for 3-7 Days  Zio monitor placement per ordering provider Amalia Lundy for the diagnosis PVC's (premature ventricular contractions) [I49.3]  and  Atrial fibrillation, unspecified type (H) [I48.91] .  Patient s skin was prepped per protocol. Dr. Amalia Lundy is the supervising MD.  Zio monitor was placed.  Instructions were reviewed with and given to the patient.  Patient verbalized understanding of wear, troubleshooting and monitor return instructions.

## 2024-06-27 PROCEDURE — 93244 EXT ECG>48HR<7D REV&INTERPJ: CPT | Performed by: INTERNAL MEDICINE

## 2024-06-28 ENCOUNTER — TELEPHONE (OUTPATIENT)
Dept: FAMILY MEDICINE | Facility: CLINIC | Age: 69
End: 2024-06-28
Payer: MEDICARE

## 2024-06-28 NOTE — TELEPHONE ENCOUNTER
Patient Quality Outreach    Patient is due for the following:   Hypertension -  BP check    Next Steps:   Patient has upcoming appointment, these items will be addressed at that time.    Type of outreach:    Chart review performed, no outreach needed.      Questions for provider review:    None           Joe Rogel MA

## 2024-07-01 DIAGNOSIS — I48.91 ATRIAL FIBRILLATION, UNSPECIFIED TYPE (H): ICD-10-CM

## 2024-07-01 RX ORDER — METOPROLOL SUCCINATE 25 MG/1
75 TABLET, EXTENDED RELEASE ORAL DAILY
Qty: 270 TABLET | Refills: 3 | Status: SHIPPED | OUTPATIENT
Start: 2024-07-01

## 2024-07-01 RX ORDER — METOPROLOL SUCCINATE 25 MG/1
75 TABLET, EXTENDED RELEASE ORAL DAILY
Status: SHIPPED
Start: 2024-07-01 | End: 2024-07-01

## 2024-07-24 DIAGNOSIS — I48.0 PAROXYSMAL ATRIAL FIBRILLATION (H): ICD-10-CM

## 2024-07-24 RX ORDER — RIVAROXABAN 20 MG/1
TABLET, FILM COATED ORAL
Qty: 90 TABLET | Refills: 0 | Status: SHIPPED | OUTPATIENT
Start: 2024-07-24

## 2024-08-13 ENCOUNTER — OFFICE VISIT (OUTPATIENT)
Dept: CARDIOLOGY | Facility: CLINIC | Age: 69
End: 2024-08-13
Payer: MEDICARE

## 2024-08-13 VITALS
SYSTOLIC BLOOD PRESSURE: 110 MMHG | BODY MASS INDEX: 28.75 KG/M2 | DIASTOLIC BLOOD PRESSURE: 64 MMHG | HEART RATE: 68 BPM | OXYGEN SATURATION: 98 % | RESPIRATION RATE: 18 BRPM | WEIGHT: 212 LBS

## 2024-08-13 DIAGNOSIS — I48.0 PAROXYSMAL ATRIAL FIBRILLATION (H): Primary | ICD-10-CM

## 2024-08-13 DIAGNOSIS — I48.91 ATRIAL FIBRILLATION, UNSPECIFIED TYPE (H): ICD-10-CM

## 2024-08-13 PROCEDURE — G2211 COMPLEX E/M VISIT ADD ON: HCPCS | Performed by: INTERNAL MEDICINE

## 2024-08-13 PROCEDURE — 99214 OFFICE O/P EST MOD 30 MIN: CPT | Performed by: INTERNAL MEDICINE

## 2024-08-13 NOTE — PATIENT INSTRUCTIONS
[facility]  Cardiac Electrophysiology  [address]  [place] [address]   Office: [phone]  Fax: [phone]     Thank you for seeing us in clinic today - it is a pleasure to be a part of your care team.  Below is a summary of our plan from today's visit.       You have atrial fibrillation, and underwent ablation (PVI) 4/4/2023.  We reviewed atrial fibrillation, treatment options (ablation vs antiarrhythmic drug therapy eg sotalol), and long term stroke risk prevention (blood thinner).  We reviewed PVCs.    We will plan for the following:  - consider options further, and let us know with any questions or decision as to how you would like to proceed  - continue metoprolol XL 75mg daily  - continue rivaroxaban 20mg daily     Please do not hesitate to be in touch with our office at [phone] with any questions that may arise.       Thank you for trusting us with your care,    Major Mercado MD  Clinical Cardiac Electrophysiology  [facility]  [address]  [place] [address]   Office: [phone]  Fax: [phone]        ATRIAL FIBRILLATION: Patient Information    What is atrial fibrillation?  Atrial fibrillation (AF, A-fib) is a common heart rhythm problem (arrhythmia) occurring within the upper chambers of the heart (the atria).  In normal rhythm, the upper and lower chambers of the heart are electrically driven to contract in a coordinated sequence.  In atrial fibrillation, the atria lose their ability to contract because of rapid and chaotic electrical activity.  The lower chambers of the heart (the ventricles) continue to pump blood throughout the body, though with irregular and often faster rate due to the chaotic activity within the atria.        How do I know if I have atrial fibrillation?   Some people may feel their heart beating faster, harder, or irregularly while in atrial fibrillation.  Others may be  lightheaded, fatigued, feel weak or tired or become more short of breath especially with activities.  Some patients have no symptoms at all.  Atrial fibrillation may be found due to an irregular pulse or on an electrocardiogram (ECG). Atrial fibrillation can start and stop on its own, and episodes can last from seconds to several months.      How common is atrial fibrillation?   An estimated 3-6 million people in the [place] have atrial fibrillation.  Atrial fibrillation is a common heart rhythm problem for older persons, affecting as estimated 12-15% of people over the age of 65 years of age.    What causes atrial fibrillation?   Age is the most important risk factor for atrial fibrillation.  Atrial fibrillation is more common in people with other heart disease, high blood pressure, diabetes, obesity, sleep apnea and in people who regularly consume alcohol.  Surgery, lung disease, or thyroid problems can lead to atrial fibrillation.  Atrial fibrillation has multiple possible causes, and in most cases a single cause cannot be found.  Atrial fibrillation is a progressive condition, usually starting with at an early stage with short and infrequent episodes.  In later stages of disease, more frequent and longer lasting episodes of atrial fibrillation occur, ultimately culminating in episodes which do not spontaneously terminate.  Generally, more enlargement and scarring within the upper chambers of the heart is observed as atrial fibrillation progresses from early to late-stage disease.    How is atrial fibrillation diagnosed and evaluated?    Because of its start-stop nature, atrial fibrillation can be challenging to diagnose.  Atrial fibrillation is most commonly diagnosed via cardiac rhythm recordings - either an ECG or wearable cardiac rhythm monitor.  For patients with pacemakers, defibrillators or implantable loop recorders, atrial fibrillation may be recorded via these devices.  Recently, commercially  available devices (eg. Apple Watch, Aisle50a device, certain FitBit devices, others) can allow patients to take 30 second cardiac rhythm recordings which may document atrial fibrillation.  Once atrial fibrillation is diagnosed, additional tests include blood tests and an echocardiogram.  The echocardiogram uses ultrasound to look at your heart to assess your cardiac function and evaluate for other heart disease.  Additional evaluation may include CT or MRI studies.    Is atrial fibrillation dangerous?   Atrial fibrillation is not usually a life-threatening arrhythmia.  The most serious consequences of atrial fibrillation including stroke and worsening of overall cardiac function.  While in atrial fibrillation, the upper cardiac chambers do not contract normally, resulting in slower blood flow and increased risk of clot formation.  If this blood clot becomes detached from the heart a stroke can occur.  Unfortunately, stroke can be the first sign of atrial fibrillation for some people.  With a stroke, you may notice abnormal sensation, weakness on one side of the body or face, changes in your vision or speech.  If you have any of these signs, you should contact EMS and be evaluated in an emergency room as soon as possible.      How is atrial fibrillation treated?     Several treatment options exist for suppressing atrial fibrillation - however, it is not an easily curable arrhythmia.  The first goal in managing atrial fibrillation is to minimize stroke risk.  The second goal is to improve symptoms associated with atrial fibrillation.  Finally, in patients with reduced cardiac function, maintaining normal rhythm can help improve cardiac function.      Blood thinners are used to reduce the risk of stroke in patients with high estimated stroke risk related to atrial fibrillation.  For patients at higher risk of bleeding related to blood thinner use, implantable devices may be an option to reduce stroke risk without the  need for long term blood thinner use.      Atrial fibrillation can be managed via two strategies: rate control and rhythm control.  In a rate control strategy, continued atrial fibrillation is accepted and medications (eg. beta-blockers or calcium channel blockers) are used to control the lower chamber rate.  In a rhythm control strategy, anti-arrhythmic medications or catheter ablation are used to maintain normal cardiac rhythm and slow disease progression by suppressing atrial fibrillation.  A procedure called a cardioversion, in which an electric shock is delivered through patches placed on the chest wall while under deep sedation, can be performed to temporarily restore normal cardiac rhythm, though does not address the chance of atrial fibrillation recurrence.  Treatments are more effective for earlier rather than later stage atrial fibrillation.  Lifestyle modifications (maintaining a healthy weight, aerobic exercise, diagnosing and treating sleep apnea, and minimizing alcohol intake) are important elements of atrial fibrillation rhythm control.     What is catheter ablation for atrial fibrillation?  Cardiac catheter ablation is a commonly performed, minimally invasive procedure performed by a cardiac electrophysiologist to treat many different cardiac rhythm abnormalities.  During catheter ablation, long, thin, flexible tubes are advanced into the heart via small sheaths inserted into the femoral veins and thermal energy (either heating or cooling) is applied within the heart to disrupt abnormal electrical activity.  Atrial fibrillation ablation is performed under general anesthesia, with procedures generally taking approximately 2-3 hours.  Patients are typically observed for 3-5 hours after the ablation, and in most cases can be discharged home the same day.  Atrial fibrillation ablation is associated with better outcomes (mortality, cardiovascular hospitalizations, atrial arrhythmia recurrences) compared  to antiarrhythmic drug therapy.  However, atrial fibrillation recurrences are not uncommon, and repeat catheter ablation may be offered.  Your electrophysiology team can review atrial fibrillation ablation, anticipated success rates, risks, and recovery expectations with you.    What are anti-arrhythmic medications?  Anti-arrhythmic medications are specialized drugs which alter cardiac electrical functioning to suppress arrhythmias.  There are several anti-arrhythmic medications available, each with its own success rate and side effects.  Some anti-arrhythmic medications are less effective though safer to use, others are more effective though have serious potential toxicities.  Atrial fibrillation recurrences are common and may require dose adjustment or change in antiarrhythmic therapy.  Your electrophysiology team will carefully consider which medication would be the best and safest for your particular case.      Can I live a normal life?    The goal of atrial fibrillation management is for patients to live normal lives without being limited by symptoms related to atrial fibrillation.    Are any additional educational resources available?  There are a number of excellent atrial fibrillation education resources available to you online.  A few options you may wish to review include:  hrsonline.org/guide-atrial-fibrillation  afibmatters.org  getsmartaboutafib.com  stopaf.com    What comes next?    Consider your management options and let us know how we can help in your decision process.  Please take medications as they have been prescribed.  You should also get any tests that may have been ordered for you.      When to Call Your Doctor or seek emergency care:  Call your doctor or seek emergency care if you have any significant changes with the following:  Weakness  Dizziness  Fainting  Fatigue  Shortness of breath  Chest pain with increased activity  If you are concerned that your heart rate is too fast or too  slow  Bleeding that does not stop in 10 minutes  Coughing or throwing up blood  Bloody diarrhea or bleeding hemorrhoids  Dark-colored urine or black stool  Allergic reactions:  Rash  Itching  Swelling  Trouble breathing or swallowing      Please call the [facility] at [phone] if you have concerns about your symptoms, your medicines, or your follow-up appointments.
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
## 2024-09-27 ENCOUNTER — LAB (OUTPATIENT)
Dept: LAB | Facility: CLINIC | Age: 69
End: 2024-09-27
Payer: MEDICARE

## 2024-09-27 DIAGNOSIS — E78.5 DYSLIPIDEMIA: ICD-10-CM

## 2024-09-27 LAB
CHOLEST SERPL-MCNC: 123 MG/DL
FASTING STATUS PATIENT QL REPORTED: YES
HDLC SERPL-MCNC: 42 MG/DL
LDLC SERPL CALC-MCNC: 62 MG/DL
NONHDLC SERPL-MCNC: 81 MG/DL
TRIGL SERPL-MCNC: 96 MG/DL

## 2024-09-27 PROCEDURE — 36415 COLL VENOUS BLD VENIPUNCTURE: CPT

## 2024-09-27 PROCEDURE — 80061 LIPID PANEL: CPT

## 2024-09-30 ENCOUNTER — IMMUNIZATION (OUTPATIENT)
Dept: FAMILY MEDICINE | Facility: CLINIC | Age: 69
End: 2024-09-30
Payer: MEDICARE

## 2024-09-30 PROCEDURE — 90480 ADMN SARSCOV2 VAC 1/ONLY CMP: CPT

## 2024-09-30 PROCEDURE — 91320 SARSCV2 VAC 30MCG TRS-SUC IM: CPT

## 2024-10-01 ENCOUNTER — OFFICE VISIT (OUTPATIENT)
Dept: CARDIOLOGY | Facility: CLINIC | Age: 69
End: 2024-10-01
Payer: MEDICARE

## 2024-10-01 VITALS
BODY MASS INDEX: 28.52 KG/M2 | SYSTOLIC BLOOD PRESSURE: 133 MMHG | DIASTOLIC BLOOD PRESSURE: 83 MMHG | WEIGHT: 210.25 LBS | HEART RATE: 58 BPM | OXYGEN SATURATION: 99 %

## 2024-10-01 DIAGNOSIS — I25.10 CORONARY ARTERY DISEASE INVOLVING NATIVE CORONARY ARTERY OF NATIVE HEART WITHOUT ANGINA PECTORIS: Primary | ICD-10-CM

## 2024-10-01 DIAGNOSIS — E78.5 DYSLIPIDEMIA: ICD-10-CM

## 2024-10-01 DIAGNOSIS — I48.91 ATRIAL FIBRILLATION, UNSPECIFIED TYPE (H): ICD-10-CM

## 2024-10-01 DIAGNOSIS — I10 ESSENTIAL HYPERTENSION: ICD-10-CM

## 2024-10-01 PROCEDURE — 99214 OFFICE O/P EST MOD 30 MIN: CPT | Performed by: INTERNAL MEDICINE

## 2024-10-01 RX ORDER — METOPROLOL TARTRATE 25 MG/1
25 TABLET, FILM COATED ORAL PRN
Qty: 30 TABLET | Refills: 3 | Status: SHIPPED | OUTPATIENT
Start: 2024-10-01
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
## 2024-10-02 DIAGNOSIS — E78.5 DYSLIPIDEMIA: Primary | ICD-10-CM

## 2024-10-22 DIAGNOSIS — I48.0 PAROXYSMAL ATRIAL FIBRILLATION (H): ICD-10-CM

## 2024-10-22 RX ORDER — RIVAROXABAN 20 MG/1
TABLET, FILM COATED ORAL
Qty: 90 TABLET | Refills: 0 | Status: SHIPPED | OUTPATIENT
Start: 2024-10-22

## 2024-10-27 ENCOUNTER — TRANSFERRED RECORDS (OUTPATIENT)
Dept: HEALTH INFORMATION MANAGEMENT | Facility: CLINIC | Age: 69
End: 2024-10-27
Payer: MEDICARE

## 2024-10-30 ENCOUNTER — TELEPHONE (OUTPATIENT)
Dept: CARDIOLOGY | Facility: CLINIC | Age: 69
End: 2024-10-30
Payer: MEDICARE

## 2024-10-30 NOTE — TELEPHONE ENCOUNTER
[facility] Call Center    Phone Message    May a detailed message be left on voicemail: yes     Reason for Call: Other: Requesting 2 day hold and bridge order of Xarelto prior to a colonoscopy for 12/10/24. Hold to start 12/8/24. If unable to do the full hold they will need most recent creatnine lab.      Action Taken: Other: cardiology    Travel Screening: Not Applicable  Thank you!  [facility]       Date of Service:

## 2024-10-30 NOTE — TELEPHONE ENCOUNTER
Dr. Lundy,  Please see message below.  Okay for patient to hold Xarelto for 2 days prior to GI procedure?  Thank you,  Asha

## 2024-11-01 NOTE — TELEPHONE ENCOUNTER
----- Message -----  From: José Lundy MD  Sent: 11/1/2024   6:58 AM CDT  To: Asha Guthrie RN    Yes, fine to go ahead and hold as proposed.  Can resume post procedure.  Thanks.    ==  Phone call to [facility], spoke with Alka. Reviewed hold orders per HALINA. Understanding verbalized. LMS

## 2024-11-09 DIAGNOSIS — I10 ESSENTIAL HYPERTENSION: ICD-10-CM

## 2024-11-11 RX ORDER — LOSARTAN POTASSIUM 50 MG/1
50 TABLET ORAL DAILY
Qty: 90 TABLET | Refills: 0 | Status: SHIPPED | OUTPATIENT
Start: 2024-11-11

## 2024-11-25 ENCOUNTER — TELEPHONE (OUTPATIENT)
Dept: CARDIOLOGY | Facility: CLINIC | Age: 69
End: 2024-11-25
Payer: MEDICARE

## 2024-11-25 NOTE — TELEPHONE ENCOUNTER
[facility]    Phone Message    May a detailed message be left on voicemail: yes     Reason for Call: Other: Pt would like a call back as he has an upcoming colonoscopy and needs to discuss holding medication for this, please reach out to pt to discuss     Action Taken: Other: Cardio    Travel Screening: Not Applicable     Date of Service:

## 2024-11-25 NOTE — TELEPHONE ENCOUNTER
PC to patient and discussed hold order as per tele call 10/30/24.  Patient verbalized understanding and agreeable with plan. He wanted to hear it from us directly.  Patient has an upcoming ortho procedure scheduled in January and requesting to hold Xarelto at that time.  Informed patient that typically the ortho provider will reach out to us with Xarelto hold order.  Patient verbalized understanding and has no further questions at this time.

## 2024-12-10 ENCOUNTER — TRANSFERRED RECORDS (OUTPATIENT)
Dept: HEALTH INFORMATION MANAGEMENT | Facility: CLINIC | Age: 69
End: 2024-12-10
Payer: MEDICARE

## 2025-01-04 SDOH — HEALTH STABILITY: PHYSICAL HEALTH: ON AVERAGE, HOW MANY DAYS PER WEEK DO YOU ENGAGE IN MODERATE TO STRENUOUS EXERCISE (LIKE A BRISK WALK)?: 5 DAYS

## 2025-01-04 SDOH — HEALTH STABILITY: PHYSICAL HEALTH: ON AVERAGE, HOW MANY MINUTES DO YOU ENGAGE IN EXERCISE AT THIS LEVEL?: 150+ MIN

## 2025-01-04 ASSESSMENT — SOCIAL DETERMINANTS OF HEALTH (SDOH): HOW OFTEN DO YOU GET TOGETHER WITH FRIENDS OR RELATIVES?: ONCE A WEEK

## 2025-01-06 ENCOUNTER — OFFICE VISIT (OUTPATIENT)
Dept: FAMILY MEDICINE | Facility: CLINIC | Age: 70
End: 2025-01-06
Payer: MEDICARE

## 2025-01-06 VITALS
DIASTOLIC BLOOD PRESSURE: 84 MMHG | TEMPERATURE: 98.4 F | BODY MASS INDEX: 28.92 KG/M2 | HEART RATE: 59 BPM | OXYGEN SATURATION: 99 % | HEIGHT: 72 IN | SYSTOLIC BLOOD PRESSURE: 131 MMHG | WEIGHT: 213.5 LBS | RESPIRATION RATE: 14 BRPM

## 2025-01-06 DIAGNOSIS — I10 ESSENTIAL HYPERTENSION: ICD-10-CM

## 2025-01-06 DIAGNOSIS — E78.2 MIXED HYPERLIPIDEMIA: ICD-10-CM

## 2025-01-06 DIAGNOSIS — N40.0 BENIGN PROSTATIC HYPERPLASIA WITHOUT URINARY OBSTRUCTION: ICD-10-CM

## 2025-01-06 DIAGNOSIS — M70.60 GREATER TROCHANTERIC BURSITIS, UNSPECIFIED LATERALITY: ICD-10-CM

## 2025-01-06 DIAGNOSIS — C44.92 SCC (SQUAMOUS CELL CARCINOMA): ICD-10-CM

## 2025-01-06 DIAGNOSIS — Z12.5 SCREENING FOR PROSTATE CANCER: ICD-10-CM

## 2025-01-06 DIAGNOSIS — I48.91 ATRIAL FIBRILLATION, UNSPECIFIED TYPE (H): ICD-10-CM

## 2025-01-06 DIAGNOSIS — I48.0 PAROXYSMAL ATRIAL FIBRILLATION (H): ICD-10-CM

## 2025-01-06 DIAGNOSIS — I25.10 ATHEROSCLEROSIS OF NATIVE CORONARY ARTERY OF NATIVE HEART WITHOUT ANGINA PECTORIS: ICD-10-CM

## 2025-01-06 DIAGNOSIS — Z00.00 ENCOUNTER FOR MEDICARE ANNUAL WELLNESS EXAM: Primary | ICD-10-CM

## 2025-01-06 PROCEDURE — G0103 PSA SCREENING: HCPCS | Performed by: FAMILY MEDICINE

## 2025-01-06 PROCEDURE — 36415 COLL VENOUS BLD VENIPUNCTURE: CPT | Performed by: FAMILY MEDICINE

## 2025-01-06 PROCEDURE — 99214 OFFICE O/P EST MOD 30 MIN: CPT | Mod: 25 | Performed by: FAMILY MEDICINE

## 2025-01-06 PROCEDURE — G0439 PPPS, SUBSEQ VISIT: HCPCS | Performed by: FAMILY MEDICINE

## 2025-01-06 RX ORDER — ALFUZOSIN HYDROCHLORIDE 10 MG/1
10 TABLET, EXTENDED RELEASE ORAL DAILY
Qty: 30 TABLET | Refills: 1 | Status: SHIPPED | OUTPATIENT
Start: 2025-01-06

## 2025-01-06 ASSESSMENT — ENCOUNTER SYMPTOMS
VOMITING: 0
COLOR CHANGE: 0
COUGH: 0
FEVER: 0
DYSURIA: 0
ARTHRALGIAS: 0
EYE PAIN: 0
SEIZURES: 0
SHORTNESS OF BREATH: 0
SORE THROAT: 0
HEMATURIA: 0
CHILLS: 0
BACK PAIN: 0
PALPITATIONS: 0
ABDOMINAL PAIN: 0

## 2025-01-06 NOTE — ASSESSMENT & PLAN NOTE
Rate controlled.  Some concern for fatigue well on metoprolol.  He is anticoagulated with Xarelto.

## 2025-01-06 NOTE — ASSESSMENT & PLAN NOTE
He continues to follow with dermatology.  Most recently, he has seen Dr[facility] Hendricks ([facility]

## 2025-01-06 NOTE — ASSESSMENT & PLAN NOTE
Reviewed. LUTS.  Will screen for prostate cancer with PSA.  He does experience retrograde ejaculation.  Trial of alfuzosin as alternative.  Discussed side effects.

## 2025-01-06 NOTE — PROGRESS NOTES
Preventive Care Visit  [facility]  Venu Dugan MD, Family Medicine  Jan 6, 2025      Assessment & Plan   Problem List Items Addressed This Visit       Atherosclerotic heart disease of native coronary artery without angina pectoris     He takes a statin.  Check lipids.  Cardiology note reviewed from November visit with Dr. Lundy.         Benign prostatic hyperplasia without urinary obstruction     Reviewed. LUTS.  Will screen for prostate cancer with PSA.  He does experience retrograde ejaculation.  Trial of alfuzosin as alternative.  Discussed side effects.           Relevant Medications    alfuzosin ER (UROXATRAL) 10 MG 24 hr tablet    Encounter for Medicare annual wellness exam - Primary     Patient is up-to-date with preventative health recommendations.  During 2024, he worked with a number of different specialists provide will result in less health care.  He has had a series of orthopedic ailments which have limited his physical activity.  Defer RSV vaccine for now.           Essential hypertension     Blood pressure within target range.  Check basic metabolic panel.  Continue current medication.         Greater trochanteric bursitis, unspecified laterality    Mixed hyperlipidemia    Paroxysmal atrial fibrillation (H)     Rate controlled.  Some concern for fatigue well on metoprolol.  He is anticoagulated with Xarelto.         SCC (squamous cell carcinoma)     He continues to follow with dermatology.  Most recently, he has seen [facility]. [facility]ricks [facility]          Other Visit Diagnoses       Screening for prostate cancer        Relevant Orders    PSA, screen    Atrial fibrillation, unspecified type (H)                 Patient has been advised of split billing requirements and indicates understanding: Yes       BMI  Estimated body mass index is 28.96 kg/m  as calculated from the following:    Height as of this encounter: 1.829 m (6').    Weight as of this encounter: 96.8  kg (213 lb 8 oz).   Weight management plan: Discussed healthy diet and exercise guidelines    Counseling  Appropriate preventive services were addressed with this patient via screening, questionnaire, or discussion as appropriate for fall prevention, nutrition, physical activity, Tobacco-use cessation, social engagement, weight loss and cognition.  Checklist reviewing preventive services available has been given to the patient.  Reviewed patient's diet, addressing concerns and/or questions.   Discussed possible causes of fatigue.     Ese Raman is a 69 year old, presenting for the following:  Physical        1/6/2025     2:12 PM   Additional Questions   Roomed by ac   Accompanied by self           Frustrated with age.  Limited by hip bursitis.  He has seen ortho.  Neck and back are creaky/noisy.      In retrospect, he believes his April chest pain was related to bands and weights.      Uses voltaran.            Health Care Directive  Patient does not have a Health Care Directive: Discussed advance care planning with patient; information given to patient to review.      1/4/2025   General Health   How would you rate your overall physical health? Good   Feel stress (tense, anxious, or unable to sleep) Not at all         1/4/2025   Nutrition   Diet: Regular (no restrictions)    Low fat/cholesterol          1/4/2025   Exercise   Days per week of moderate/strenous exercise 5 days   Average minutes spent exercising at this level 150+ min         1/4/2025   Social Factors   Frequency of gathering with friends or relatives Once a week   Worry food won't last until get money to buy more No   Food not last or not have enough money for food? No   Do you have housing? (Housing is defined as stable permanent housing and does not include staying ouside in a car, in a tent, in an abandoned building, in an overnight shelter, or couch-surfing.) Yes   Are you worried about losing your housing? No   Lack of transportation? No    Unable to get utilities (heat,electricity)? No         1/4/2025   Fall Risk   Fallen 2 or more times in the past year? No    No   Trouble with walking or balance? No    No       Multiple values from one day are sorted in reverse-chronological order          1/4/2025   Activities of Daily Living- Home Safety   Needs help with the following daily activites None of the above   Safety concerns in the home None of the above         1/4/2025   Dental   Dentist two times every year? Yes         1/4/2025   Hearing Screening   Hearing concerns? None of the above         1/4/2025   Driving Risk Screening   Patient/family members have concerns about driving No         1/4/2025   General Alertness/Fatigue Screening   Have you been more tired than usual lately? (!) YES         1/4/2025   Urinary Incontinence Screening   Bothered by leaking urine in past 6 months No         1/4/2025   TB Screening   Were you born outside of the [place]? No         Today's PHQ-2 Score:       1/6/2025     2:04 PM   PHQ-2 ( 1999 [employer])   Q1: Little interest or pleasure in doing things 0   Q2: Feeling down, depressed or hopeless 0   PHQ-2 Score 0    Q1: Little interest or pleasure in doing things Not at all   Q2: Feeling down, depressed or hopeless Not at all   PHQ-2 Score 0       Patient-reported           1/4/2025   Substance Use   Alcohol more than 3/day or more than 7/wk No   Do you have a current opioid prescription? No   How severe/bad is pain from 1 to 10? 2/10   Do you use any other substances recreationally? No     Social History     Tobacco Use    Smoking status: Never     Passive exposure: Never    Smokeless tobacco: Never   Vaping Use    Vaping status: Never Used   Substance Use Topics    Alcohol use: Yes    Drug use: Never           1/4/2025   AAA Screening   Family history of Abdominal Aortic Aneurysm (AAA)? No   ASCVD Risk   The ASCVD Risk score (Mariely RENDON, et al., 2019) failed to calculate for the following reasons:    The  valid total cholesterol range is 130 to 320 mg/dL        Reviewed and updated as needed this visit by Provider   Tobacco  Allergies  Meds  Problems  Med Hx  Surg Hx  Fam Hx            Patient Active Problem List   Diagnosis    Mixed hyperlipidemia    Essential hypertension    Anxiety    Claustrophobia    Gilbert's disease    Rosacea    Encounter for Medicare annual wellness exam    S/P ablation of atrial fibrillation    Benign prostatic hyperplasia without urinary obstruction    SCC (squamous cell carcinoma)    Atherosclerotic heart disease of native coronary artery without angina pectoris    Other headache syndrome    Paroxysmal atrial fibrillation (H)    Greater trochanteric bursitis, unspecified laterality     Past Surgical History:   Procedure Laterality Date    CARDIAC SURGERY  4/2023    AF ablation    EYE SURGERY      due to double vision 2018    HERNIA REPAIR      2010    NO HISTORY OF SURGERY         Social History     Tobacco Use    Smoking status: Never     Passive exposure: Never    Smokeless tobacco: Never   Substance Use Topics    Alcohol use: Yes     Family History   Problem Relation Age of Onset    Hypertension Mother     Cancer Mother         lung     Other Cancer Mother         Lung cancer    Osteoporosis Mother     Hypertension Father     Cancer Father         lung     Other Cancer Father         Lung cancer    Pulmonary Embolism Sister     Breast Cancer Sister     Osteoporosis Sister     Other - See Comments Brother         prostate issue-pt had TURP     Osteopenia Sister     Osteoporosis Sister     Cerebrovascular Disease Paternal Grandfather         age in 50's    Amblyopia No family hx of     Strabismus No family hx of     Colon Cancer No family hx of     Prostate Cancer No family hx of          Current providers sharing in care for this patient include:  Patient Care Team:  Venu Dugan MD as PCP - General (Family Medicine)  Venu Dugan MD as Assigned PCP  Major Mercado  MD Derrek as MD (Cardiovascular Disease)  José Lundy MD as Assigned Heart and Vascular Provider    The following health maintenance items are reviewed in Epic and correct as of today:  Health Maintenance   Topic Date Due    RSV VACCINE (1 - Risk 60-74 years 1-dose series) Never done    BMP  04/08/2025    LIPID  09/27/2025    DTAP/TDAP/TD IMMUNIZATION (3 - Td or Tdap) 11/27/2025    MEDICARE ANNUAL WELLNESS VISIT  01/06/2026    ANNUAL REVIEW OF HM ORDERS  01/06/2026    FALL RISK ASSESSMENT  01/06/2026    GLUCOSE  04/08/2027    ADVANCE CARE PLANNING  12/04/2028    COLORECTAL CANCER SCREENING  12/10/2029    PHQ-2 (once per calendar year)  Completed    INFLUENZA VACCINE  Completed    Pneumococcal Vaccine: 50+ Years  Completed    ZOSTER IMMUNIZATION  Completed    COVID-19 Vaccine  Completed    HPV IMMUNIZATION  Aged Out    MENINGITIS IMMUNIZATION  Aged Out    RSV MONOCLONAL ANTIBODY  Aged Out    HEPATITIS C SCREENING  Discontinued     Review of Systems   Constitutional:  Negative for chills and fever.   HENT:  Negative for ear pain and sore throat.    Eyes:  Negative for pain and visual disturbance.   Respiratory:  Negative for cough and shortness of breath.    Cardiovascular:  Negative for chest pain and palpitations.   Gastrointestinal:  Negative for abdominal pain and vomiting.   Genitourinary:  Negative for dysuria and hematuria.   Musculoskeletal:  Negative for arthralgias and back pain.   Skin:  Negative for color change and rash.   Neurological:  Negative for seizures and syncope.   All other systems reviewed and are negative.         Objective    Exam  /84 (BP Location: Left arm, Patient Position: Sitting, Cuff Size: Adult Large)   Pulse 59   Temp 98.4  F (36.9  C) (Oral)   Resp 14   Ht 1.829 m (6')   Wt 96.8 kg (213 lb 8 oz)   SpO2 99%   BMI 28.96 kg/m     Estimated body mass index is 28.96 kg/m  as calculated from the following:    Height as of this encounter: 1.829 m (6').    Weight  as of this encounter: 96.8 kg (213 lb 8 oz).    Physical Exam  Vitals reviewed.   Constitutional:       General: He is not in acute distress.     Appearance: Normal appearance. He is not ill-appearing.   HENT:      Head: Normocephalic and atraumatic.      Right Ear: External ear normal.      Left Ear: External ear normal.      Nose: Nose normal.      Mouth/Throat:      Pharynx: Oropharynx is clear. No oropharyngeal exudate or posterior oropharyngeal erythema.   Eyes:      General: No scleral icterus.        Right eye: No discharge.         Left eye: No discharge.      Extraocular Movements: Extraocular movements intact.      Conjunctiva/sclera: Conjunctivae normal.      Pupils: Pupils are equal, round, and reactive to light.   Neck:      Comments: No thyromegaly.  Cardiovascular:      Rate and Rhythm: Normal rate and regular rhythm.      Heart sounds: Normal heart sounds. No murmur heard.     No friction rub. No gallop.   Pulmonary:      Effort: Pulmonary effort is normal. No respiratory distress.      Breath sounds: Normal breath sounds. No wheezing or rales.   Abdominal:      General: There is no distension.      Palpations: Abdomen is soft. There is no mass.      Tenderness: There is no abdominal tenderness.   Musculoskeletal:         General: No signs of injury. Normal range of motion.      Cervical back: Normal range of motion.      Right lower leg: No edema.      Left lower leg: No edema.   Lymphadenopathy:      Cervical: No cervical adenopathy.   Skin:     General: Skin is warm.      Coloration: Skin is not jaundiced.      Findings: No rash.   Neurological:      General: No focal deficit present.      Mental Status: He is alert and oriented to person, place, and time.      Cranial Nerves: No cranial nerve deficit.      Deep Tendon Reflexes: Reflexes normal.   Psychiatric:         Mood and Affect: Mood normal.           1/6/2025   Mini Cog   Clock Draw Score 2 Normal   3 Item Recall 3 objects recalled   Mini  Cog Total Score 5              Signed Electronically by: Venu Dugan MD

## 2025-01-06 NOTE — PATIENT INSTRUCTIONS
Patient Education   Preventive Care Advice   This is general advice given by our system to help you stay healthy. However, your care team may have specific advice just for you. Please talk to your care team about your preventive care needs.  Nutrition  Eat 5 or more servings of fruits and vegetables each day.  Try wheat bread, brown rice and whole grain pasta (instead of white bread, rice, and pasta).  Get enough calcium and vitamin D. Check the label on foods and aim for 100% of the RDA (recommended daily allowance).  Lifestyle  Exercise at least 150 minutes each week  (30 minutes a day, 5 days a week).  Do muscle strengthening activities 2 days a week. These help control your weight and prevent disease.  No smoking.  Wear sunscreen to prevent skin cancer.  Have a dental exam and cleaning every 6 months.  Yearly exams  See your health care team every year to talk about:  Any changes in your health.  Any medicines your care team has prescribed.  Preventive care, family planning, and ways to prevent chronic diseases.  Shots (vaccines)   HPV shots (up to age 26), if you've never had them before.  Hepatitis B shots (up to age 59), if you've never had them before.  COVID-19 shot: Get this shot when it's due.  Flu shot: Get a flu shot every year.  Tetanus shot: Get a tetanus shot every 10 years.  Pneumococcal, hepatitis A, and RSV shots: Ask your care team if you need these based on your risk.  Shingles shot (for age 50 and up)  General health tests  Diabetes screening:  Starting at age 35, Get screened for diabetes at least every 3 years.  If you are younger than age 35, ask your care team if you should be screened for diabetes.  Cholesterol test: At age 39, start having a cholesterol test every 5 years, or more often if advised.  Bone density scan (DEXA): At age 50, ask your care team if you should have this scan for osteoporosis (brittle bones).  Hepatitis C: Get tested at least once in your life.  STIs (sexually  transmitted infections)  Before age 24: Ask your care team if you should be screened for STIs.  After age 24: Get screened for STIs if you're at risk. You are at risk for STIs (including HIV) if:  You are sexually active with more than one person.  You don't use condoms every time.  You or a partner was diagnosed with a sexually transmitted infection.  If you are at risk for HIV, ask about PrEP medicine to prevent HIV.  Get tested for HIV at least once in your life, whether you are at risk for HIV or not.  Cancer screening tests  Cervical cancer screening: If you have a cervix, begin getting regular cervical cancer screening tests starting at age 21.  Breast cancer scan (mammogram): If you've ever had breasts, begin having regular mammograms starting at age 40. This is a scan to check for breast cancer.  Colon cancer screening: It is important to start screening for colon cancer at age 45.  Have a colonoscopy test every 10 years (or more often if you're at risk) Or, ask your provider about stool tests like a FIT test every year or Cologuard test every 3 years.  To learn more about your testing options, visit:   .  For help making a decision, visit:   https://bit.ly/yh34514.  Prostate cancer screening test: If you have a prostate, ask your care team if a prostate cancer screening test (PSA) at age 55 is right for you.  Lung cancer screening: If you are a current or former smoker ages 50 to 80, ask your care team if ongoing lung cancer screenings are right for you.  For informational purposes only. Not to replace the advice of your health care provider. Copyright   2023 [facility]. All rights reserved. Clinically reviewed by the [facility] Transitions Program. [employer] [id number] - REV 01/24.  Learning About Sleeping Well  What does sleeping well mean?     Sleeping well means getting enough sleep to feel good and stay healthy. How much sleep is enough varies among people.  The number of hours you  sleep and how you feel when you wake up are both important. If you do not feel refreshed, you probably need more sleep. Another sign of not getting enough sleep is feeling tired during the day.  Experts recommend that adults get at least 7 or more hours of sleep per day. Children and older adults need more sleep.  Why is getting enough sleep important?  Getting enough quality sleep is a basic part of good health. When your sleep suffers, your physical health, mood, and your thoughts can suffer too. You may find yourself feeling more grumpy or stressed. Not getting enough sleep also can lead to serious problems, including injury, accidents, anxiety, and depression.  What might cause poor sleeping?  Many things can cause sleep problems, including:  Changes to your sleep schedule.  Stress. Stress can be caused by fear about a single event, such as giving a speech. Or you may have ongoing stress, such as worry about work or school.  Depression, anxiety, and other mental or emotional conditions.  Changes in your sleep habits or surroundings. This includes changes that happen where you sleep, such as noise, light, or sleeping in a different bed. It also includes changes in your sleep pattern, such as having jet lag or working a late shift.  Health problems, such as pain, breathing problems, and restless legs syndrome.  Lack of regular exercise.  Using alcohol, nicotine, or caffeine before bed.  How can you help yourself?  Here are some tips that may help you sleep more soundly and wake up feeling more refreshed.  Your sleeping area   Use your bedroom only for sleeping and sex. A bit of light reading may help you fall asleep. But if it doesn't, do your reading elsewhere in the house. Try not to use your TV, computer, smartphone, or tablet while you are in bed.  Be sure your bed is big enough to stretch out comfortably, especially if you have a sleep partner.  Keep your bedroom quiet, dark, and cool. Use curtains, blinds,  "or a sleep mask to block out light. To block out noise, use earplugs, soothing music, or a \"white noise\" machine.  Your evening and bedtime routine   Create a relaxing bedtime routine. You might want to take a warm shower or bath, or listen to soothing music.  Go to bed at the same time every night. And get up at the same time every morning, even if you feel tired.  What to avoid   Limit caffeine (coffee, tea, caffeinated sodas) during the day, and don't have any for at least 6 hours before bedtime.  Avoid drinking alcohol before bedtime. Alcohol can cause you to wake up more often during the night.  Try not to smoke or use tobacco, especially in the evening. Nicotine can keep you awake.  Limit naps during the day, especially close to bedtime.  Avoid lying in bed awake for too long. If you can't fall asleep or if you wake up in the middle of the night and can't get back to sleep within about 20 minutes, get out of bed and go to another room until you feel sleepy.  Avoid taking medicine right before bed that may keep you awake or make you feel hyper or energized. Your doctor can tell you if your medicine may do this and if you can take it earlier in the day.  If you can't sleep   Imagine yourself in a peaceful, pleasant scene. Focus on the details and feelings of being in a place that is relaxing.  Get up and do a quiet or boring activity until you feel sleepy.  Avoid drinking any liquids before going to bed to help prevent waking up often to use the bathroom.  Where can you learn more?  Go to https://www.[employer].net/patiented  Enter J942 in the search box to learn more about \"Learning About Sleeping Well.\"  Current as of: July 31, 2024  Content Version: 14.3    2024 [employer].   Care instructions adapted under license by your healthcare professional. If you have questions about a medical condition or this instruction, always ask your healthcare professional. [employer] disclaims any " warranty or liability for your use of this information.

## 2025-01-07 LAB — PSA SERPL DL<=0.01 NG/ML-MCNC: 2.9 NG/ML (ref 0–6.5)

## 2025-01-07 NOTE — ASSESSMENT & PLAN NOTE
Patient is up-to-date with preventative health recommendations.  During 2024, he worked with a number of different specialists provide will result in less health care.  He has had a series of orthopedic ailments which have limited his physical activity.  Defer RSV vaccine for now.

## 2025-01-13 ENCOUNTER — TELEPHONE (OUTPATIENT)
Dept: CARDIOLOGY | Facility: CLINIC | Age: 70
End: 2025-01-13
Payer: MEDICARE

## 2025-01-13 NOTE — TELEPHONE ENCOUNTER
Dr. Lundy,  Patient is scheduled to undergo trigger thumb surgery 1/22/2025.  May patient hold Xarelto prior to upcoming hand surgery and if so how many days do you recommend?  Thank you,  Asha UMANA to patient to discuss Xarelto hold prior to 1/22, minor, hand surgery/trigger thumb.   [facility] provider is requesting Dr. Lundy to recommend hold time/orders.  Assured patient an update will be forwarded to Dr. Lundy and patient will be contacted once reviewed.        Per 11/26/24 [facility] Documentation:

## 2025-01-13 NOTE — TELEPHONE ENCOUNTER
[facility] Call Center    Phone Message    May a detailed message be left on voicemail: yes     Reason for Call: Other: Pt has an upcoming minor procedure on his thumb on 1/22/25. His provider would like him to briefly stop the Xarelto. Please review and call pt back to further discuss and advise. Thank you!     Action Taken: Message routed to:  Other: [facility] [[id number]]    Travel Screening: Not Applicable     Date of Service:                                                                      REVIEW OF SYSTEMS:    GENERAL: negative  SKIN: negative  HEENT: negative  HEART: negative  ANESTHESIA: negative  LUNGS: negative  GI: negative GI  : negative  MUSCULOSKELETAL: negative  NEUROLOGIC: syncope and unsteady gait and frequent falls  MENTAL HEALTH: negative  ENDOCRINE: negative  LYMPHATIC: easy bruising

## 2025-01-14 NOTE — TELEPHONE ENCOUNTER
Left detailed message with my direct number to call for recommendations.    José Lundy MD  You3 minutes ago (9:38 AM)       Would advocate that he hold for 3 days prior and then can resume afterward when felt reasonable by proceduralist.  Thanks.

## 2025-01-14 NOTE — TELEPHONE ENCOUNTER
Patient called back to my direct line. Recommendations reviewed with patient. Understanding verbalized and no further questions.

## 2025-01-29 ENCOUNTER — MYC MEDICAL ADVICE (OUTPATIENT)
Dept: FAMILY MEDICINE | Facility: CLINIC | Age: 70
End: 2025-01-29
Payer: MEDICARE

## 2025-01-29 DIAGNOSIS — N40.0 BENIGN PROSTATIC HYPERPLASIA WITHOUT URINARY OBSTRUCTION: ICD-10-CM

## 2025-01-30 RX ORDER — TAMSULOSIN HYDROCHLORIDE 0.4 MG/1
0.4 CAPSULE ORAL DAILY
Qty: 90 CAPSULE | Refills: 3 | Status: SHIPPED | OUTPATIENT
Start: 2025-01-30

## 2025-01-30 NOTE — TELEPHONE ENCOUNTER
1/6 OV note-   Benign prostatic hyperplasia without urinary obstruction        Reviewed. LUTS.  Will screen for prostate cancer with PSA.  He does experience retrograde ejaculation.  Trial of alfuzosin as alternative.  Discussed side effects.             Relevant Medications     alfuzosin ER (UROXATRAL) 10 MG 24 hr tablet

## 2025-02-05 ENCOUNTER — TRANSFERRED RECORDS (OUTPATIENT)
Dept: HEALTH INFORMATION MANAGEMENT | Facility: CLINIC | Age: 70
End: 2025-02-05
Payer: MEDICARE

## 2025-03-24 ENCOUNTER — MYC MEDICAL ADVICE (OUTPATIENT)
Dept: FAMILY MEDICINE | Facility: CLINIC | Age: 70
End: 2025-03-24
Payer: MEDICARE

## 2025-03-24 DIAGNOSIS — E78.2 MIXED HYPERLIPIDEMIA: ICD-10-CM

## 2025-03-24 RX ORDER — ATORVASTATIN CALCIUM 40 MG/1
40 TABLET, FILM COATED ORAL DAILY
Qty: 90 TABLET | Refills: 1 | Status: SHIPPED | OUTPATIENT
Start: 2025-03-24

## 2025-03-27 ENCOUNTER — OFFICE VISIT (OUTPATIENT)
Dept: CARDIOLOGY | Facility: CLINIC | Age: 70
End: 2025-03-27
Attending: INTERNAL MEDICINE
Payer: MEDICARE

## 2025-03-27 VITALS
RESPIRATION RATE: 16 BRPM | DIASTOLIC BLOOD PRESSURE: 77 MMHG | HEIGHT: 72 IN | TEMPERATURE: 98.6 F | SYSTOLIC BLOOD PRESSURE: 135 MMHG | HEART RATE: 52 BPM | BODY MASS INDEX: 29.62 KG/M2 | OXYGEN SATURATION: 96 % | WEIGHT: 218.7 LBS

## 2025-03-27 DIAGNOSIS — I10 HYPERTENSION, UNSPECIFIED TYPE: ICD-10-CM

## 2025-03-27 DIAGNOSIS — E78.5 DYSLIPIDEMIA: ICD-10-CM

## 2025-03-27 DIAGNOSIS — I48.91 ATRIAL FIBRILLATION, UNSPECIFIED TYPE (H): ICD-10-CM

## 2025-03-27 DIAGNOSIS — I25.10 CORONARY ARTERY DISEASE INVOLVING NATIVE CORONARY ARTERY OF NATIVE HEART WITHOUT ANGINA PECTORIS: Primary | ICD-10-CM

## 2025-03-27 RX ORDER — TRAMADOL HYDROCHLORIDE 50 MG/1
TABLET ORAL
COMMUNITY
Start: 2025-01-22

## 2025-03-27 RX ORDER — EYELID CLEANSER COMBINATION 1
FOAM (ML) TOPICAL
COMMUNITY
Start: 2024-12-10
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
## 2025-04-28 ENCOUNTER — TELEPHONE (OUTPATIENT)
Dept: CARDIOLOGY | Facility: CLINIC | Age: 70
End: 2025-04-28
Payer: MEDICARE

## 2025-04-28 DIAGNOSIS — I48.91 ATRIAL FIBRILLATION, UNSPECIFIED TYPE (H): ICD-10-CM

## 2025-04-28 RX ORDER — METOPROLOL SUCCINATE 25 MG/1
75 TABLET, EXTENDED RELEASE ORAL DAILY
Qty: 270 TABLET | Refills: 3 | Status: SHIPPED | OUTPATIENT
Start: 2025-04-28

## 2025-04-28 NOTE — TELEPHONE ENCOUNTER
Contacted patient who further explained he is seeing oral surgeon late this week about a single implant.  Oral surgeon has told him they will not need him to hold it from their perspective, he was just checking with cardiology.  Advised if he can have implant without Xarelto interruption that is preferred, patient agreed.  Will call back if he has further questions or concerns.

## 2025-04-28 NOTE — TELEPHONE ENCOUNTER
[facility] Call Center    Phone Message    May a detailed message be left on voicemail: yes     Reason for Call: Medication Question or concern regarding medication   Prescription Clarification  Name of Medication: XARELTO ANTICOAGULANT 20 MG TABS tablet [[id number]] (Order [id number])   Prescribing Provider: ligia      What on the order needs clarification? Pt is having dental work done and is wondering if he should do any hold/bridge orders on the above medication. Please call pt back to discuss. Pt is having his procedure this Thursday       Action Taken: Other: cardiology     Travel Screening: Not Applicable    Thank you!  [facility]        Date of Service:

## 2025-05-25 ENCOUNTER — MYC REFILL (OUTPATIENT)
Dept: FAMILY MEDICINE | Facility: CLINIC | Age: 70
End: 2025-05-25
Payer: MEDICARE

## 2025-05-25 DIAGNOSIS — I10 ESSENTIAL HYPERTENSION: ICD-10-CM

## 2025-05-26 RX ORDER — LOSARTAN POTASSIUM 50 MG/1
50 TABLET ORAL DAILY
Qty: 90 TABLET | Refills: 2 | Status: SHIPPED | OUTPATIENT
Start: 2025-05-26